# Patient Record
Sex: FEMALE | Race: BLACK OR AFRICAN AMERICAN | NOT HISPANIC OR LATINO | Employment: OTHER | URBAN - METROPOLITAN AREA
[De-identification: names, ages, dates, MRNs, and addresses within clinical notes are randomized per-mention and may not be internally consistent; named-entity substitution may affect disease eponyms.]

---

## 2017-01-10 DIAGNOSIS — I10 ESSENTIAL HYPERTENSION: ICD-10-CM

## 2017-01-10 RX ORDER — LOSARTAN POTASSIUM 25 MG/1
25 TABLET ORAL DAILY
Qty: 90 TABLET | Refills: 1 | Status: SHIPPED | OUTPATIENT
Start: 2017-01-10 | End: 2017-07-14 | Stop reason: SDUPTHER

## 2017-01-10 RX ORDER — OMEPRAZOLE 20 MG/1
CAPSULE, DELAYED RELEASE ORAL
Qty: 180 CAPSULE | Refills: 3 | Status: SHIPPED | OUTPATIENT
Start: 2017-01-10 | End: 2018-05-15 | Stop reason: SDUPTHER

## 2017-01-18 ENCOUNTER — OFFICE VISIT (OUTPATIENT)
Dept: INTERNAL MEDICINE | Facility: CLINIC | Age: 62
End: 2017-01-18

## 2017-01-18 VITALS
WEIGHT: 236.4 LBS | TEMPERATURE: 97.4 F | DIASTOLIC BLOOD PRESSURE: 82 MMHG | HEIGHT: 67 IN | BODY MASS INDEX: 37.1 KG/M2 | SYSTOLIC BLOOD PRESSURE: 124 MMHG

## 2017-01-18 DIAGNOSIS — Z85.850 HISTORY OF THYROID CANCER: ICD-10-CM

## 2017-01-18 DIAGNOSIS — I10 ESSENTIAL HYPERTENSION: ICD-10-CM

## 2017-01-18 DIAGNOSIS — E11.9 TYPE 2 DIABETES MELLITUS WITHOUT COMPLICATION, WITHOUT LONG-TERM CURRENT USE OF INSULIN (HCC): Primary | ICD-10-CM

## 2017-01-18 DIAGNOSIS — E78.5 HYPERLIPIDEMIA, UNSPECIFIED HYPERLIPIDEMIA TYPE: ICD-10-CM

## 2017-01-18 DIAGNOSIS — J30.9 ALLERGIC RHINITIS, UNSPECIFIED ALLERGIC RHINITIS TRIGGER, UNSPECIFIED RHINITIS SEASONALITY: ICD-10-CM

## 2017-01-18 LAB — HBA1C MFR BLD: 5.9 %

## 2017-01-18 PROCEDURE — 83036 HEMOGLOBIN GLYCOSYLATED A1C: CPT | Performed by: NURSE PRACTITIONER

## 2017-01-18 PROCEDURE — 99214 OFFICE O/P EST MOD 30 MIN: CPT | Performed by: NURSE PRACTITIONER

## 2017-01-18 RX ORDER — ATENOLOL 25 MG/1
25 TABLET ORAL DAILY
Qty: 90 TABLET | Refills: 1 | Status: SHIPPED | OUTPATIENT
Start: 2017-01-18 | End: 2018-07-10 | Stop reason: SDUPTHER

## 2017-01-18 RX ORDER — ATENOLOL 25 MG/1
25 TABLET ORAL DAILY
Qty: 90 TABLET | Refills: 1 | Status: CANCELLED | OUTPATIENT
Start: 2017-01-18

## 2017-01-18 RX ORDER — TRIAMTERENE AND HYDROCHLOROTHIAZIDE 37.5; 25 MG/1; MG/1
1 TABLET ORAL DAILY
Qty: 90 TABLET | Refills: 1 | Status: CANCELLED | OUTPATIENT
Start: 2017-01-18

## 2017-01-18 RX ORDER — TRIAMTERENE AND HYDROCHLOROTHIAZIDE 37.5; 25 MG/1; MG/1
1 TABLET ORAL DAILY
Qty: 90 TABLET | Refills: 1 | Status: SHIPPED | OUTPATIENT
Start: 2017-01-18 | End: 2018-02-06 | Stop reason: SDUPTHER

## 2017-01-18 NOTE — PROGRESS NOTES
Subjective   Livier Gutierrez is a 61 y.o. female    Chief Complaint   Patient presents with   • 3 month follow up   • Diabetes     Due for A1C   • Hypertension   • Hyperlipidemia     History of Present Illness     DM II - does not ck FS @home; currently on 500mg of Metformin daily; A1C was 5.9% today, which is unchanged from her last ck.      HTN - chronic and stable on Cozaar, Atenolol and HCTZ; she was in a big rush to get here so she thinks that is why it is up.      HL - has been off of her pravastatin for a while; made her feel terrible      H/O thyroid CA - managed by oncologist at  currently has her on 150mcg; Let her go to the [facility]. she is now seeing her every 6 months. Sees them again in Sept. They did labs and all of the testing and everything came back good.       GERD is chronic and stable on Omeprazole, but only takes it PRN      Allergies are doing ok. Taking Claritin everyday, but likes to have an albuterol inhaler on hand for wheezing/SOA.       Bilateral knee pain - needs bilateral knee replacement, but is trying to hold out; Has been swimming and this really helps.       Flu shot 10/16  Pneumovax 10/13   Zostavax, declines  Prevnar - 4/19/16  Tdap 2012   DEXA - 10/14 (normal)   Ovarian Ca screening; [facility]   Mammn 11/2016  Pap - 7/2014; no longer doing ([facility])  Colon 2016 ([facility]); repeating in 1 year this time, he found some mild crohns  Eye exam - 2/2016  Foot exam 8/6/2015      The following portions of the patient's history were reviewed and updated as appropriate: allergies, current medications, past family history, past medical history, past social history, past surgical history and problem list.    Current Outpatient Prescriptions:   •  albuterol (PROVENTIL HFA) 108 (90 BASE) MCG/ACT inhaler, Inhale 2 puffs every 4 (four) hours as needed for wheezing., Disp: , Rfl:   •  atenolol (TENORMIN) 25 MG tablet, Take 1 tablet by mouth Daily., Disp: 90 tablet, Rfl: 1  •  budesonide-formoterol (SYMBICORT)  80-4.5 MCG/ACT inhaler, Inhale 2 puffs 2 (two) times a day., Disp: , Rfl:   •  diclofenac (VOLTAREN) 75 MG EC tablet, Take 1 tablet by mouth 2 (Two) Times a Day As Needed (for joint pain)., Disp: 180 tablet, Rfl: 1  •  levothyroxine (SYNTHROID, LEVOTHROID) 150 MCG tablet, Take 150 mcg by mouth daily., Disp: , Rfl:   •  losartan (COZAAR) 25 MG tablet, Take 1 tablet by mouth Daily., Disp: 90 tablet, Rfl: 1  •  metFORMIN (GLUCOPHAGE) 500 MG tablet, Take 1 tablet by mouth Daily., Disp: 90 tablet, Rfl: 1  •  omeprazole (priLOSEC) 20 MG capsule, TAKE ONE CAPSULE BY MOUTH TWICE A DAY WITH DICLOFENAC, Disp: 180 capsule, Rfl: 3  •  triamterene-hydrochlorothiazide (MAXZIDE-25) 37.5-25 MG per tablet, Take 1 tablet by mouth Daily., Disp: 90 tablet, Rfl: 1  •  glucose blood test strip, 1 po bid as directed, Disp: 50 each, Rfl: 12  •  glucose monitor monitoring kit, 1 each Daily., Disp: 1 each, Rfl: 0  •  Lancets misc, 1 each Daily., Disp: 60 each, Rfl: 11     Review of Systems   Constitutional: Negative for chills, fatigue and fever.   Respiratory: Negative for cough, chest tightness and shortness of breath.    Cardiovascular: Negative for chest pain.   Gastrointestinal: Negative for abdominal pain, diarrhea, nausea and vomiting.   Endocrine: Negative for cold intolerance and heat intolerance.   Musculoskeletal: Negative for arthralgias.   Neurological: Negative for dizziness.       Objective   Physical Exam   Constitutional: She is oriented to person, place, and time. She appears well-developed and well-nourished.   HENT:   Head: Normocephalic and atraumatic.   Eyes: Conjunctivae and EOM are normal. Pupils are equal, round, and reactive to light.   Neck: Normal range of motion.   Cardiovascular: Normal rate, regular rhythm and normal heart sounds.    Pulmonary/Chest: Effort normal and breath sounds normal.   Abdominal: Soft. Bowel sounds are normal.   Musculoskeletal: Normal range of motion.   Neurological: She is alert and  "oriented to person, place, and time. She has normal reflexes.   Skin: Skin is warm and dry.   Psychiatric: She has a normal mood and affect. Her behavior is normal. Judgment and thought content normal.     Vitals:    01/18/17 1014 01/18/17 1039   BP: 136/98 124/82   Temp: 97.4 °F (36.3 °C)    TempSrc: Temporal Artery     Weight: 236 lb 6.4 oz (107 kg)    Height: 66.5\" (168.9 cm)          Assessment/Plan   Livier was seen today for 3 month follow up, diabetes, hypertension and hyperlipidemia.    Diagnoses and all orders for this visit:    Type 2 diabetes mellitus without complication, without long-term current use of insulin  -     POC Glycosylated Hemoglobin (Hb A1C)  -     metFORMIN (GLUCOPHAGE) 500 MG tablet; Take 1 tablet by mouth Daily.  -     CBC & Differential; Future  -     Comprehensive Metabolic Panel; Future  -     Lipid Panel; Future  -     Vitamin D 25 Hydroxy; Future  -     Vitamin B12; Future  -     TSH; Future    Essential hypertension  -     triamterene-hydrochlorothiazide (MAXZIDE-25) 37.5-25 MG per tablet; Take 1 tablet by mouth Daily.  -     atenolol (TENORMIN) 25 MG tablet; Take 1 tablet by mouth Daily.  -     CBC & Differential; Future  -     Comprehensive Metabolic Panel; Future  -     Lipid Panel; Future  -     Vitamin D 25 Hydroxy; Future  -     Vitamin B12; Future  -     TSH; Future    Hyperlipidemia, unspecified hyperlipidemia type  -     CBC & Differential; Future  -     Comprehensive Metabolic Panel; Future  -     Lipid Panel; Future  -     Vitamin D 25 Hydroxy; Future  -     Vitamin B12; Future  -     TSH; Future    Allergic rhinitis, unspecified allergic rhinitis trigger, unspecified rhinitis seasonality  -     CBC & Differential; Future  -     Comprehensive Metabolic Panel; Future  -     Lipid Panel; Future  -     Vitamin D 25 Hydroxy; Future  -     Vitamin B12; Future  -     TSH; Future    History of thyroid cancer  -     CBC & Differential; Future  -     Comprehensive Metabolic " Panel; Future  -     Lipid Panel; Future  -     Vitamin D 25 Hydroxy; Future  -     Vitamin B12; Future  -     TSH; Future    Other orders  -     Cancel: triamterene-hydrochlorothiazide (MAXZIDE-25) 37.5-25 MG per tablet; Take 1 tablet by mouth Daily.  -     Cancel: metFORMIN (GLUCOPHAGE) 500 MG tablet; Take 1 tablet by mouth Daily.  -     Cancel: atenolol (TENORMIN) 25 MG tablet; Take 1 tablet by mouth Daily.      Livier is not fasting, so she will RTC for labs  Meds refilled  No changes made today  F/U in 6 months or sooner with any problems

## 2017-01-18 NOTE — MR AVS SNAPSHOT
Livier Palacioss   1/18/2017 10:15 AM   Office Visit    Dept Phone:  [phone]   Encounter #:  [id number]    Provider:  GUSTAVO Marcelino   Department:  [facility] INTERNAL MEDICINE                Your Full Care Plan              Where to Get Your Medications      These medications were sent to [address] - [address] [place] [address] ROAD - [phone] PH - [phone]   [address] [place] [address]     Phone:  [phone]     atenolol 25 MG tablet    metFORMIN 500 MG tablet    triamterene-hydrochlorothiazide 37.5-25 MG per tablet            Your Updated Medication List          This list is accurate as of: 1/18/17 10:45 AM.  Always use your most recent med list.                atenolol 25 MG tablet   Commonly known as:  TENORMIN   Take 1 tablet by mouth Daily.       budesonide-formoterol 80-4.5 MCG/ACT inhaler   Commonly known as:  SYMBICORT       diclofenac 75 MG EC tablet   Commonly known as:  VOLTAREN   Take 1 tablet by mouth 2 (Two) Times a Day As Needed (for joint pain).       glucose blood test strip   1 po bid as directed       glucose monitor monitoring kit   1 each Daily.       Lancets misc   1 each Daily.       levothyroxine 150 MCG tablet   Commonly known as:  SYNTHROID, LEVOTHROID       losartan 25 MG tablet   Commonly known as:  COZAAR   Take 1 tablet by mouth Daily.       metFORMIN 500 MG tablet   Commonly known as:  GLUCOPHAGE   Take 1 tablet by mouth Daily.       omeprazole 20 MG capsule   Commonly known as:  priLOSEC   TAKE ONE CAPSULE BY MOUTH TWICE A DAY WITH DICLOFENAC       PROVENTIL  (90 BASE) MCG/ACT inhaler   Generic drug:  albuterol       triamterene-hydrochlorothiazide 37.5-25 MG per tablet   Commonly known as:  MAXZIDE-25   Take 1 tablet by mouth Daily.               We Performed the Following     POC Glycosylated Hemoglobin (Hb A1C)       You Were Diagnosed With        Codes Comments    Type 2 diabetes mellitus without complication, without long-term current use of insulin    -  Primary ICD-10-CM: E11.9  ICD-9-CM: 250.00     Essential hypertension     ICD-10-CM: I10  ICD-9-CM: 401.9     Hyperlipidemia, unspecified hyperlipidemia type     ICD-10-CM: E78.5  ICD-9-CM: 272.4     Allergic rhinitis, unspecified allergic rhinitis trigger, unspecified rhinitis seasonality     ICD-10-CM: J30.9  ICD-9-CM: 477.9     History of thyroid cancer     ICD-10-CM: Z85.850  ICD-9-CM: V10.87       Instructions     None    Patient Instructions History      Upcoming Appointments     Visit Type Date Time Department    FOLLOW UP 2017 10:15 AM [address]    FOLLOW UP 2017  9:30 AM [address]      [employer] Signup     [facility]ry allows you to send messages to your doctor, view your test results, renew your prescriptions, schedule appointments, and more. To sign up, go to [employer] and click on the Sign Up Now link in the New User? box. Enter your Pantry Activation Code exactly as it appears below along with the last four digits of your Social Security Number and your Date of Birth () to complete the sign-up process. If you do not sign up before the expiration date, you must request a new code.    Pantry Activation Code: [email or web address]  Expires: 2017 10:45 AM    If you have questions, you can email [email or web address] or call [phone] to talk to our Pantry staff. Remember, Pantry is NOT to be used for urgent needs. For medical emergencies, dial 911.               Other Info from Your Visit           Your Appointments     2017  9:30 AM EDT   Follow Up with GUSTAVO Marcelino   [facility] (--)     [address] [place] [id number] [phone]           Arrive 15 minutes prior to appointment.              Allergies     Latex  Angioedema    Penicillins        Reason for Visit     3  "month follow up     Diabetes Due for A1C    Hypertension     Hyperlipidemia           Vital Signs     Blood Pressure Temperature Height Weight Last Menstrual Period Body Mass Index    124/82 97.4 °F (36.3 °C) (Temporal Artery ) 66.5\" (168.9 cm) 236 lb 6.4 oz (107 kg) (Approximate) 37.58 kg/m2    Smoking Status                   Former Smoker           Problems and Diagnoses Noted     Nasal inflammation due to allergen    Diabetes    History of thyroid cancer    High cholesterol or triglycerides    High blood pressure      Results     POC Glycosylated Hemoglobin (Hb A1C)      Component Value Standard Range & Units    Hemoglobin A1C 5.9 %                    "

## 2017-01-25 ENCOUNTER — TELEPHONE (OUTPATIENT)
Dept: INTERNAL MEDICINE | Facility: CLINIC | Age: 62
End: 2017-01-25

## 2017-01-25 NOTE — TELEPHONE ENCOUNTER
I spoke with Livier and let her know that 3 places that are self referral for podiatry and she said that you told her a specific female name that she should go to.  She said Nikky Garcia is who she had seen and loved but she retired and she said you mentioned another Female name? I told her I would check with you and see if you remember any specific.

## 2017-01-25 NOTE — TELEPHONE ENCOUNTER
Called Livier back and left her a msg letting her know that  You recommended was Dr. Ana Galeas at [facility] and I left her the phone to her office which is [phone]. I told her she can call me back if she has any problems or questions.

## 2017-01-25 NOTE — TELEPHONE ENCOUNTER
----- Message from Alexsandra Gold sent at 1/25/2017 10:48 AM EST -----  NEEDS NAME OF PODIATRY THAT WAS RECOMMENDED TO HER SHE FORGOT AND DID NOT WRITE DOWN

## 2017-02-14 ENCOUNTER — LAB (OUTPATIENT)
Dept: INTERNAL MEDICINE | Facility: CLINIC | Age: 62
End: 2017-02-14

## 2017-02-14 DIAGNOSIS — Z85.850 HISTORY OF THYROID CANCER: ICD-10-CM

## 2017-02-14 DIAGNOSIS — I10 ESSENTIAL HYPERTENSION: ICD-10-CM

## 2017-02-14 DIAGNOSIS — E78.5 HYPERLIPIDEMIA, UNSPECIFIED HYPERLIPIDEMIA TYPE: ICD-10-CM

## 2017-02-14 DIAGNOSIS — J30.9 ALLERGIC RHINITIS, UNSPECIFIED ALLERGIC RHINITIS TRIGGER, UNSPECIFIED RHINITIS SEASONALITY: ICD-10-CM

## 2017-02-14 DIAGNOSIS — E11.9 TYPE 2 DIABETES MELLITUS WITHOUT COMPLICATION, WITHOUT LONG-TERM CURRENT USE OF INSULIN (HCC): ICD-10-CM

## 2017-02-14 LAB
25(OH)D3 SERPL-MCNC: 36.4 NG/ML
ALBUMIN SERPL-MCNC: 3.8 G/DL (ref 3.2–4.8)
ALBUMIN/GLOB SERPL: 1.3 G/DL (ref 1.5–2.5)
ALP SERPL-CCNC: 40 U/L (ref 25–100)
ALT SERPL W P-5'-P-CCNC: 22 U/L (ref 7–40)
ANION GAP SERPL CALCULATED.3IONS-SCNC: 4 MMOL/L (ref 3–11)
ARTICHOKE IGE QN: 102 MG/DL (ref 0–130)
AST SERPL-CCNC: 21 U/L (ref 0–33)
BASOPHILS # BLD AUTO: 0.03 10*3/MM3 (ref 0–0.2)
BASOPHILS NFR BLD AUTO: 0.4 % (ref 0–1)
BILIRUB SERPL-MCNC: 0.4 MG/DL (ref 0.3–1.2)
BUN BLD-MCNC: 22 MG/DL (ref 9–23)
BUN/CREAT SERPL: 24.4 (ref 7–25)
CALCIUM SPEC-SCNC: 9 MG/DL (ref 8.7–10.4)
CHLORIDE SERPL-SCNC: 103 MMOL/L (ref 99–109)
CHOLEST SERPL-MCNC: 162 MG/DL (ref 0–200)
CO2 SERPL-SCNC: 31 MMOL/L (ref 20–31)
CREAT BLD-MCNC: 0.9 MG/DL (ref 0.6–1.3)
DEPRECATED RDW RBC AUTO: 45 FL (ref 37–54)
EOSINOPHIL # BLD AUTO: 0.22 10*3/MM3 (ref 0.1–0.3)
EOSINOPHIL NFR BLD AUTO: 2.9 % (ref 0–3)
ERYTHROCYTE [DISTWIDTH] IN BLOOD BY AUTOMATED COUNT: 16 % (ref 11.3–14.5)
GFR SERPL CREATININE-BSD FRML MDRD: 77 ML/MIN/1.73
GLOBULIN UR ELPH-MCNC: 3 GM/DL
GLUCOSE BLD-MCNC: 88 MG/DL (ref 70–100)
HCT VFR BLD AUTO: 35.6 % (ref 34.5–44)
HDLC SERPL-MCNC: 36 MG/DL (ref 40–60)
HGB BLD-MCNC: 11.2 G/DL (ref 11.5–15.5)
IMM GRANULOCYTES # BLD: 0.02 10*3/MM3 (ref 0–0.03)
IMM GRANULOCYTES NFR BLD: 0.3 % (ref 0–0.6)
LYMPHOCYTES # BLD AUTO: 1.93 10*3/MM3 (ref 0.6–4.8)
LYMPHOCYTES NFR BLD AUTO: 25.3 % (ref 24–44)
MCH RBC QN AUTO: 24.2 PG (ref 27–31)
MCHC RBC AUTO-ENTMCNC: 31.5 G/DL (ref 32–36)
MCV RBC AUTO: 77.1 FL (ref 80–99)
MONOCYTES # BLD AUTO: 0.71 10*3/MM3 (ref 0–1)
MONOCYTES NFR BLD AUTO: 9.3 % (ref 0–12)
NEUTROPHILS # BLD AUTO: 4.72 10*3/MM3 (ref 1.5–8.3)
NEUTROPHILS NFR BLD AUTO: 61.8 % (ref 41–71)
PLATELET # BLD AUTO: 285 10*3/MM3 (ref 150–450)
PMV BLD AUTO: 8.5 FL (ref 6–12)
POTASSIUM BLD-SCNC: 4 MMOL/L (ref 3.5–5.5)
PROT SERPL-MCNC: 6.8 G/DL (ref 5.7–8.2)
RBC # BLD AUTO: 4.62 10*6/MM3 (ref 3.89–5.14)
SODIUM BLD-SCNC: 138 MMOL/L (ref 132–146)
TRIGL SERPL-MCNC: 140 MG/DL (ref 0–150)
TSH SERPL DL<=0.05 MIU/L-ACNC: 0.12 MIU/ML (ref 0.35–5.35)
VIT B12 BLD-MCNC: 951 PG/ML (ref 211–911)
WBC NRBC COR # BLD: 7.63 10*3/MM3 (ref 3.5–10.8)

## 2017-02-14 PROCEDURE — 82607 VITAMIN B-12: CPT | Performed by: NURSE PRACTITIONER

## 2017-02-14 PROCEDURE — 82306 VITAMIN D 25 HYDROXY: CPT | Performed by: NURSE PRACTITIONER

## 2017-02-14 PROCEDURE — 84443 ASSAY THYROID STIM HORMONE: CPT | Performed by: NURSE PRACTITIONER

## 2017-02-14 PROCEDURE — 85025 COMPLETE CBC W/AUTO DIFF WBC: CPT | Performed by: NURSE PRACTITIONER

## 2017-02-14 PROCEDURE — 80061 LIPID PANEL: CPT | Performed by: NURSE PRACTITIONER

## 2017-02-14 PROCEDURE — 80053 COMPREHEN METABOLIC PANEL: CPT | Performed by: NURSE PRACTITIONER

## 2017-03-09 ENCOUNTER — OFFICE VISIT (OUTPATIENT)
Dept: INTERNAL MEDICINE | Facility: CLINIC | Age: 62
End: 2017-03-09

## 2017-03-09 VITALS
HEIGHT: 67 IN | HEART RATE: 78 BPM | SYSTOLIC BLOOD PRESSURE: 126 MMHG | BODY MASS INDEX: 36.16 KG/M2 | TEMPERATURE: 98.1 F | DIASTOLIC BLOOD PRESSURE: 78 MMHG | OXYGEN SATURATION: 98 % | WEIGHT: 230.4 LBS

## 2017-03-09 DIAGNOSIS — R53.1 WEAKNESS: ICD-10-CM

## 2017-03-09 DIAGNOSIS — R53.83 FATIGUE, UNSPECIFIED TYPE: Primary | ICD-10-CM

## 2017-03-09 PROCEDURE — 99213 OFFICE O/P EST LOW 20 MIN: CPT | Performed by: NURSE PRACTITIONER

## 2017-03-09 NOTE — PROGRESS NOTES
Subjective   Livier Gutierrez is a 61 y.o. female    Chief Complaint   Patient presents with   • Fatigue     lack of appetite and weakness--5 days   • lack of appetite   • Dry Mouth     Fatigue   This is a new problem. The current episode started in the past 7 days (started suddenly on sunday night. ). The problem occurs constantly. The problem has been waxing and waning (seems to be worse in the mornings. ). Associated symptoms include anorexia, chills (one on tues day but none since) and fatigue. Pertinent negatives include no abdominal pain, arthralgias, change in bowel habit, chest pain, coughing, diaphoresis, fever, headaches, joint swelling, myalgias, nausea, neck pain, numbness, rash, sore throat, swollen glands, urinary symptoms, vertigo, visual change, vomiting or weakness. Nothing aggravates the symptoms. She has tried sleep and eating (better after drinking apple juice and ginger ale.  Taking zicam.  Has  not checked her BP or glucose while feeliing ill) for the symptoms. The treatment provided moderate relief.   Has had recent exposure to GI illnesses in her household and has felt gassy, had decreased appetite, and fatigue, but no N/V/D.     The following portions of the patient's history were reviewed and updated as appropriate: allergies, current medications, past family history, past medical history, past social history, past surgical history and problem list.    Current Outpatient Prescriptions:   •  albuterol (PROVENTIL HFA) 108 (90 BASE) MCG/ACT inhaler, Inhale 2 puffs every 4 (four) hours as needed for wheezing., Disp: , Rfl:   •  atenolol (TENORMIN) 25 MG tablet, Take 1 tablet by mouth Daily., Disp: 90 tablet, Rfl: 1  •  budesonide-formoterol (SYMBICORT) 80-4.5 MCG/ACT inhaler, Inhale 2 puffs 2 (two) times a day., Disp: , Rfl:   •  diclofenac (VOLTAREN) 75 MG EC tablet, Take 1 tablet by mouth 2 (Two) Times a Day As Needed (for joint pain)., Disp: 180 tablet, Rfl: 1  •  glucose blood test strip, 1 po  bid as directed, Disp: 50 each, Rfl: 12  •  glucose monitor monitoring kit, 1 each Daily., Disp: 1 each, Rfl: 0  •  Lancets misc, 1 each Daily., Disp: 60 each, Rfl: 11  •  levothyroxine (SYNTHROID, LEVOTHROID) 150 MCG tablet, Take 150 mcg by mouth daily., Disp: , Rfl:   •  losartan (COZAAR) 25 MG tablet, Take 1 tablet by mouth Daily., Disp: 90 tablet, Rfl: 1  •  metFORMIN (GLUCOPHAGE) 500 MG tablet, Take 1 tablet by mouth Daily., Disp: 90 tablet, Rfl: 1  •  omeprazole (priLOSEC) 20 MG capsule, TAKE ONE CAPSULE BY MOUTH TWICE A DAY WITH DICLOFENAC, Disp: 180 capsule, Rfl: 3  •  triamterene-hydrochlorothiazide (MAXZIDE-25) 37.5-25 MG per tablet, Take 1 tablet by mouth Daily., Disp: 90 tablet, Rfl: 1     Review of Systems   Constitutional: Positive for appetite change, chills (one on tues day but none since) and fatigue. Negative for diaphoresis and fever.   HENT: Negative for sore throat.         Dry mouth   Eyes: Positive for discharge. Eye itching: left eye itching and watering intermittently for about a month.   Respiratory: Negative for cough, chest tightness and shortness of breath.    Cardiovascular: Negative for chest pain.   Gastrointestinal: Positive for anorexia. Negative for abdominal pain, change in bowel habit, diarrhea, nausea and vomiting.   Endocrine: Negative for cold intolerance and heat intolerance.   Musculoskeletal: Negative for arthralgias, joint swelling, myalgias and neck pain.   Skin: Negative for rash.   Neurological: Negative for dizziness, vertigo, weakness, numbness and headaches.     Objective   Physical Exam   Constitutional: She is oriented to person, place, and time. She appears well-developed and well-nourished.   HENT:   Head: Normocephalic and atraumatic.   Eyes: Conjunctivae and EOM are normal. Pupils are equal, round, and reactive to light. Right eye exhibits discharge (clear). Right eye exhibits no chemosis. Left eye exhibits no chemosis and no discharge. Right conjunctiva is not  "injected. Left conjunctiva is not injected.   Neck: Normal range of motion.   Cardiovascular: Normal rate, regular rhythm and normal heart sounds.    Pulmonary/Chest: Effort normal and breath sounds normal.   Abdominal: Soft. Bowel sounds are normal.   Musculoskeletal: Normal range of motion.   Neurological: She is alert and oriented to person, place, and time. She has normal reflexes.   Skin: Skin is warm and dry.   Psychiatric: She has a normal mood and affect. Her behavior is normal. Judgment and thought content normal.     Vitals:    03/09/17 1635   BP: 126/78   Pulse: 78   Temp: 98.1 °F (36.7 °C)   SpO2: 98%   Weight: 230 lb 6.4 oz (105 kg)   Height: 66.5\" (168.9 cm)       Assessment/Plan   Livier was seen today for fatigue, lack of appetite and dry mouth.    Diagnoses and all orders for this visit:    Fatigue, unspecified type  -     CBC & Differential  -     Comprehensive Metabolic Panel  -     TSH    Weakness  -     CBC & Differential  -     Comprehensive Metabolic Panel  -     TSH      We will ck labs today  Increase fluids  Symptomatic management from here  RTC if sx's worsen or do not improve         Scribed for GUSTAVO Bettencourt by GUSTAVO Montes Student. 3/14/2017  3:49 PM    I, GUSTAVO Bettencourt, personally performed the services described in this documentation as scribed by the above named individual in my presence, and it is both accurate and complete.  3/14/2017  3:49 PM    GUSTAVO Patterson    "

## 2017-04-11 RX ORDER — DICLOFENAC SODIUM 75 MG/1
TABLET, DELAYED RELEASE ORAL
Qty: 180 TABLET | Refills: 0 | Status: SHIPPED | OUTPATIENT
Start: 2017-04-11 | End: 2017-07-11 | Stop reason: SDUPTHER

## 2017-07-11 ENCOUNTER — OFFICE VISIT (OUTPATIENT)
Dept: INTERNAL MEDICINE | Facility: CLINIC | Age: 62
End: 2017-07-11

## 2017-07-11 VITALS
DIASTOLIC BLOOD PRESSURE: 78 MMHG | TEMPERATURE: 97.3 F | HEIGHT: 67 IN | WEIGHT: 228 LBS | OXYGEN SATURATION: 98 % | HEART RATE: 63 BPM | SYSTOLIC BLOOD PRESSURE: 132 MMHG | BODY MASS INDEX: 35.79 KG/M2

## 2017-07-11 DIAGNOSIS — M25.511 RIGHT SHOULDER PAIN, UNSPECIFIED CHRONICITY: ICD-10-CM

## 2017-07-11 DIAGNOSIS — E78.5 HYPERLIPIDEMIA, UNSPECIFIED HYPERLIPIDEMIA TYPE: ICD-10-CM

## 2017-07-11 DIAGNOSIS — C73 MALIGNANT NEOPLASM OF THYROID GLAND (HCC): ICD-10-CM

## 2017-07-11 DIAGNOSIS — I10 ESSENTIAL HYPERTENSION: ICD-10-CM

## 2017-07-11 DIAGNOSIS — M54.12 CERVICAL RADICULOPATHY: ICD-10-CM

## 2017-07-11 DIAGNOSIS — Z11.59 NEED FOR HEPATITIS C SCREENING TEST: ICD-10-CM

## 2017-07-11 DIAGNOSIS — E11.9 TYPE 2 DIABETES MELLITUS WITHOUT COMPLICATION, WITHOUT LONG-TERM CURRENT USE OF INSULIN (HCC): Primary | ICD-10-CM

## 2017-07-11 LAB
25(OH)D3 SERPL-MCNC: 38 NG/ML
ALBUMIN SERPL-MCNC: 3.8 G/DL (ref 3.2–4.8)
ALBUMIN/GLOB SERPL: 1.3 G/DL (ref 1.5–2.5)
ALP SERPL-CCNC: 48 U/L (ref 25–100)
ALT SERPL W P-5'-P-CCNC: 16 U/L (ref 7–40)
ANION GAP SERPL CALCULATED.3IONS-SCNC: 2 MMOL/L (ref 3–11)
ARTICHOKE IGE QN: 120 MG/DL (ref 0–130)
AST SERPL-CCNC: 18 U/L (ref 0–33)
BASOPHILS # BLD AUTO: 0.03 10*3/MM3 (ref 0–0.2)
BASOPHILS NFR BLD AUTO: 0.4 % (ref 0–1)
BILIRUB SERPL-MCNC: 0.4 MG/DL (ref 0.3–1.2)
BUN BLD-MCNC: 22 MG/DL (ref 9–23)
BUN/CREAT SERPL: 22 (ref 7–25)
CALCIUM SPEC-SCNC: 9.1 MG/DL (ref 8.7–10.4)
CHLORIDE SERPL-SCNC: 102 MMOL/L (ref 99–109)
CHOLEST SERPL-MCNC: 185 MG/DL (ref 0–200)
CO2 SERPL-SCNC: 36 MMOL/L (ref 20–31)
CREAT BLD-MCNC: 1 MG/DL (ref 0.6–1.3)
DEPRECATED RDW RBC AUTO: 44.5 FL (ref 37–54)
EOSINOPHIL # BLD AUTO: 0.33 10*3/MM3 (ref 0–0.3)
EOSINOPHIL NFR BLD AUTO: 4.6 % (ref 0–3)
ERYTHROCYTE [DISTWIDTH] IN BLOOD BY AUTOMATED COUNT: 15.8 % (ref 11.3–14.5)
GFR SERPL CREATININE-BSD FRML MDRD: 68 ML/MIN/1.73
GLOBULIN UR ELPH-MCNC: 3 GM/DL
GLUCOSE BLD-MCNC: 92 MG/DL (ref 70–100)
HBA1C MFR BLD: 5.6 %
HCT VFR BLD AUTO: 35.4 % (ref 34.5–44)
HCV AB SER DONR QL: NORMAL
HDLC SERPL-MCNC: 40 MG/DL (ref 40–60)
HGB BLD-MCNC: 11.2 G/DL (ref 11.5–15.5)
IMM GRANULOCYTES # BLD: 0.01 10*3/MM3 (ref 0–0.03)
IMM GRANULOCYTES NFR BLD: 0.1 % (ref 0–0.6)
LYMPHOCYTES # BLD AUTO: 2.02 10*3/MM3 (ref 0.6–4.8)
LYMPHOCYTES NFR BLD AUTO: 28.1 % (ref 24–44)
MCH RBC QN AUTO: 24.3 PG (ref 27–31)
MCHC RBC AUTO-ENTMCNC: 31.6 G/DL (ref 32–36)
MCV RBC AUTO: 77 FL (ref 80–99)
MONOCYTES # BLD AUTO: 0.68 10*3/MM3 (ref 0–1)
MONOCYTES NFR BLD AUTO: 9.4 % (ref 0–12)
NEUTROPHILS # BLD AUTO: 4.13 10*3/MM3 (ref 1.5–8.3)
NEUTROPHILS NFR BLD AUTO: 57.4 % (ref 41–71)
PLATELET # BLD AUTO: 299 10*3/MM3 (ref 150–450)
PMV BLD AUTO: 8.5 FL (ref 6–12)
POTASSIUM BLD-SCNC: 4.2 MMOL/L (ref 3.5–5.5)
PROT SERPL-MCNC: 6.8 G/DL (ref 5.7–8.2)
RBC # BLD AUTO: 4.6 10*6/MM3 (ref 3.89–5.14)
SODIUM BLD-SCNC: 140 MMOL/L (ref 132–146)
TRIGL SERPL-MCNC: 115 MG/DL (ref 0–150)
TSH SERPL DL<=0.05 MIU/L-ACNC: 0.1 MIU/ML (ref 0.35–5.35)
VIT B12 BLD-MCNC: 896 PG/ML (ref 211–911)
WBC NRBC COR # BLD: 7.2 10*3/MM3 (ref 3.5–10.8)

## 2017-07-11 PROCEDURE — 84443 ASSAY THYROID STIM HORMONE: CPT | Performed by: NURSE PRACTITIONER

## 2017-07-11 PROCEDURE — 86803 HEPATITIS C AB TEST: CPT | Performed by: NURSE PRACTITIONER

## 2017-07-11 PROCEDURE — 99214 OFFICE O/P EST MOD 30 MIN: CPT | Performed by: NURSE PRACTITIONER

## 2017-07-11 PROCEDURE — 83036 HEMOGLOBIN GLYCOSYLATED A1C: CPT | Performed by: NURSE PRACTITIONER

## 2017-07-11 PROCEDURE — 82306 VITAMIN D 25 HYDROXY: CPT | Performed by: NURSE PRACTITIONER

## 2017-07-11 PROCEDURE — 80061 LIPID PANEL: CPT | Performed by: NURSE PRACTITIONER

## 2017-07-11 PROCEDURE — 82607 VITAMIN B-12: CPT | Performed by: NURSE PRACTITIONER

## 2017-07-11 PROCEDURE — 85025 COMPLETE CBC W/AUTO DIFF WBC: CPT | Performed by: NURSE PRACTITIONER

## 2017-07-11 PROCEDURE — 80053 COMPREHEN METABOLIC PANEL: CPT | Performed by: NURSE PRACTITIONER

## 2017-07-11 RX ORDER — DICLOFENAC SODIUM 75 MG/1
75 TABLET, DELAYED RELEASE ORAL 2 TIMES DAILY PRN
Qty: 180 TABLET | Refills: 1 | Status: SHIPPED | OUTPATIENT
Start: 2017-07-11 | End: 2018-01-08 | Stop reason: SDUPTHER

## 2017-07-11 RX ORDER — GABAPENTIN 300 MG/1
300 CAPSULE ORAL 3 TIMES DAILY
COMMUNITY
Start: 2017-06-06 | End: 2018-07-26

## 2017-07-11 NOTE — PROGRESS NOTES
Subjective   Livier Gutierrez is a 62 y.o. female    Chief Complaint   Patient presents with   • 6 month follow up   • Diabetes     due for A1c.   • Hypertension   • Hyperlipidemia     History of Present Illness     DM II - does not ck FS @home; currently on 500mg of Metformin daily; A1C was 5.6% today, which is down from her last A1C of 5.9% in January.  She has lost about 8 lbs through diet since her last follow up    Since her last f/u, she has seen podiatry.  An EMG of her LE's was ordered for BLE pain, N/T.  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  She was started on Gabapentin.  Cannot tolerate a higher dose than 2 PO QHS.  Does feel that it has helped with her sx's.       Having decreased ROM and pain in her right shoulder with drawing in her right hand over the past month.  No N/T.  No new injury or trauma.  She does have a h/o right shoulder pain/problems.  Went to PT years ago and has been doing the exercises that she was given, but it is not helping.        HTN - chronic and stable on Cozaar, Atenolol and HCTZ; she was in a big rush to get here so she thinks that is why it is up.      HL - has been off of her pravastatin for a while; made her feel terrible      H/O thyroid CA - managed by oncologist at  currently has her on 150mcg; Let her go to the [facility]. She is now seeing her every 6 months.        GERD is chronic and stable on Omeprazole, but only takes it PRN      Allergies are doing ok. Taking Claritin everyday, but likes to have an albuterol inhaler on hand for wheezing/SOA.       Bilateral knee pain - needs bilateral knee replacement, but is trying to hold out; Has been swimming and this really helps. Takes Diclofenac PRN for pain      Flu shot 10/16  Pneumovax 10/13   Zostavax, declines  Prevnar - 4/19/16  Tdap 2012   DEXA - 10/14 (normal)   Ovarian Ca screening; [facility]   Mammn 11/2016  Pap - 7/2014; no longer doing ([facility])  Colon 2016 ([facility]); repeating in 1 year this time, he  found some mild crohns - she will call  Eye exam - 2/2017  Foot exam 8/6/2015       The following portions of the patient's history were reviewed and updated as appropriate: allergies, current medications, past family history, past medical history, past social history, past surgical history and problem list.    Current Outpatient Prescriptions:   •  albuterol (PROVENTIL HFA) 108 (90 BASE) MCG/ACT inhaler, Inhale 2 puffs every 4 (four) hours as needed for wheezing., Disp: , Rfl:   •  atenolol (TENORMIN) 25 MG tablet, Take 1 tablet by mouth Daily., Disp: 90 tablet, Rfl: 1  •  budesonide-formoterol (SYMBICORT) 80-4.5 MCG/ACT inhaler, Inhale 2 puffs 2 (two) times a day., Disp: , Rfl:   •  diclofenac (VOLTAREN) 75 MG EC tablet, Take 1 tablet by mouth 2 (Two) Times a Day As Needed (pain)., Disp: 180 tablet, Rfl: 1  •  gabapentin (NEURONTIN) 300 MG capsule, 2 po qhs, Disp: , Rfl:   •  glucose blood test strip, 1 po bid as directed, Disp: 50 each, Rfl: 12  •  glucose monitor monitoring kit, 1 each Daily., Disp: 1 each, Rfl: 0  •  Lancets misc, 1 each Daily., Disp: 60 each, Rfl: 11  •  levothyroxine (SYNTHROID, LEVOTHROID) 150 MCG tablet, Take 150 mcg by mouth daily., Disp: , Rfl:   •  losartan (COZAAR) 25 MG tablet, Take 1 tablet by mouth Daily., Disp: 90 tablet, Rfl: 1  •  metFORMIN (GLUCOPHAGE) 500 MG tablet, Take 1 tablet by mouth Daily., Disp: 90 tablet, Rfl: 1  •  omeprazole (priLOSEC) 20 MG capsule, TAKE ONE CAPSULE BY MOUTH TWICE A DAY WITH DICLOFENAC, Disp: 180 capsule, Rfl: 3  •  triamterene-hydrochlorothiazide (MAXZIDE-25) 37.5-25 MG per tablet, Take 1 tablet by mouth Daily., Disp: 90 tablet, Rfl: 1     Review of Systems   Constitutional: Negative for chills, fatigue and fever.   Respiratory: Negative for cough, chest tightness and shortness of breath.    Cardiovascular: Negative for chest pain.   Gastrointestinal: Negative for abdominal pain, diarrhea, nausea and vomiting.   Endocrine: Negative for cold  "intolerance and heat intolerance.   Musculoskeletal: Positive for arthralgias and myalgias.        RUE pain/hand drawing   Neurological: Negative for dizziness.       Objective   Physical Exam   Constitutional: She is oriented to person, place, and time. She appears well-developed and well-nourished.   HENT:   Head: Normocephalic and atraumatic.   Eyes: Conjunctivae and EOM are normal. Pupils are equal, round, and reactive to light.   Neck: Normal range of motion.   Cardiovascular: Normal rate, regular rhythm and normal heart sounds.    Pulmonary/Chest: Effort normal and breath sounds normal.   Abdominal: Soft. Bowel sounds are normal.   Musculoskeletal:        Right shoulder: She exhibits decreased range of motion, crepitus and decreased strength. She exhibits no tenderness, no bony tenderness, no swelling, no effusion, no deformity, no laceration, no pain, no spasm and normal pulse.        Cervical back: Normal.   Neurological: She is alert and oriented to person, place, and time. She has normal reflexes.   Skin: Skin is warm and dry.   Psychiatric: She has a normal mood and affect. Her behavior is normal. Judgment and thought content normal.     Vitals:    07/11/17 0947   BP: 132/78   BP Location: Left arm   Pulse: 63   Temp: 97.3 °F (36.3 °C)   TempSrc: Temporal Artery    SpO2: 98%   Weight: 228 lb (103 kg)   Height: 66.5\" (168.9 cm)         Assessment/Plan   Livier was seen today for 6 month follow up, diabetes, hypertension and hyperlipidemia.    Diagnoses and all orders for this visit:    Type 2 diabetes mellitus without complication, without long-term current use of insulin  -     POC Glycosylated Hemoglobin (Hb A1C)  -     CBC & Differential  -     Comprehensive Metabolic Panel  -     Lipid Panel  -     Vitamin D 25 Hydroxy  -     Vitamin B12  -     TSH  -     metFORMIN (GLUCOPHAGE) 500 MG tablet; Take 1 tablet by mouth Daily.  -     CBC Auto Differential    Cervical radiculopathy  -     XR Spine Cervical " Complete 4 or 5 View; Future  -     CBC & Differential  -     Comprehensive Metabolic Panel  -     Lipid Panel  -     Vitamin D 25 Hydroxy  -     Vitamin B12  -     TSH  -     diclofenac (VOLTAREN) 75 MG EC tablet; Take 1 tablet by mouth 2 (Two) Times a Day As Needed (pain).  -     CBC Auto Differential    Right shoulder pain, unspecified chronicity  -     XR Shoulder 2+ View Right; Future  -     CBC & Differential  -     Comprehensive Metabolic Panel  -     Lipid Panel  -     Vitamin D 25 Hydroxy  -     Vitamin B12  -     TSH  -     diclofenac (VOLTAREN) 75 MG EC tablet; Take 1 tablet by mouth 2 (Two) Times a Day As Needed (pain).  -     CBC Auto Differential    Hyperlipidemia, unspecified hyperlipidemia type  -     CBC & Differential  -     Comprehensive Metabolic Panel  -     Lipid Panel  -     Vitamin D 25 Hydroxy  -     Vitamin B12  -     TSH  -     CBC Auto Differential    Essential hypertension  -     CBC & Differential  -     Comprehensive Metabolic Panel  -     Lipid Panel  -     Vitamin D 25 Hydroxy  -     Vitamin B12  -     TSH  -     CBC Auto Differential    Malignant neoplasm of thyroid gland  -     CBC & Differential  -     Comprehensive Metabolic Panel  -     Lipid Panel  -     Vitamin D 25 Hydroxy  -     Vitamin B12  -     TSH  -     CBC Auto Differential    Need for hepatitis C screening test  -     Hepatitis C Antibody    We will have Livier go for an xray of her neck and right shoulder based on RUE sx's  If normal, may need EMG and/or PT  Labs sent   No med changes  She will call [facility] to inquire about a colonoscopy

## 2017-07-14 DIAGNOSIS — I10 ESSENTIAL HYPERTENSION: ICD-10-CM

## 2017-07-14 RX ORDER — LOSARTAN POTASSIUM 25 MG/1
TABLET ORAL
Qty: 90 TABLET | Refills: 1 | Status: SHIPPED | OUTPATIENT
Start: 2017-07-14 | End: 2018-01-08 | Stop reason: SDUPTHER

## 2017-12-21 DIAGNOSIS — E11.9 TYPE 2 DIABETES MELLITUS WITHOUT COMPLICATION, WITHOUT LONG-TERM CURRENT USE OF INSULIN (HCC): ICD-10-CM

## 2018-01-08 DIAGNOSIS — I10 ESSENTIAL HYPERTENSION: ICD-10-CM

## 2018-01-08 DIAGNOSIS — M25.511 RIGHT SHOULDER PAIN, UNSPECIFIED CHRONICITY: ICD-10-CM

## 2018-01-08 DIAGNOSIS — M54.12 CERVICAL RADICULOPATHY: ICD-10-CM

## 2018-01-08 RX ORDER — LOSARTAN POTASSIUM 25 MG/1
TABLET ORAL
Qty: 90 TABLET | Refills: 0 | Status: SHIPPED | OUTPATIENT
Start: 2018-01-08 | End: 2018-04-16 | Stop reason: SDUPTHER

## 2018-01-08 RX ORDER — DICLOFENAC SODIUM 75 MG/1
TABLET, DELAYED RELEASE ORAL
Qty: 180 TABLET | Refills: 0 | Status: SHIPPED | OUTPATIENT
Start: 2018-01-08 | End: 2018-04-09 | Stop reason: SDUPTHER

## 2018-01-18 ENCOUNTER — OFFICE VISIT (OUTPATIENT)
Dept: INTERNAL MEDICINE | Facility: CLINIC | Age: 63
End: 2018-01-18

## 2018-01-18 VITALS
OXYGEN SATURATION: 98 % | RESPIRATION RATE: 18 BRPM | HEART RATE: 66 BPM | HEIGHT: 66 IN | WEIGHT: 238.6 LBS | SYSTOLIC BLOOD PRESSURE: 102 MMHG | BODY MASS INDEX: 38.35 KG/M2 | TEMPERATURE: 97.7 F | DIASTOLIC BLOOD PRESSURE: 62 MMHG

## 2018-01-18 DIAGNOSIS — I10 ESSENTIAL HYPERTENSION: ICD-10-CM

## 2018-01-18 DIAGNOSIS — M17.0 OSTEOARTHRITIS OF BOTH KNEES, UNSPECIFIED OSTEOARTHRITIS TYPE: ICD-10-CM

## 2018-01-18 DIAGNOSIS — Z85.850 HISTORY OF THYROID CANCER: ICD-10-CM

## 2018-01-18 DIAGNOSIS — E11.9 TYPE 2 DIABETES MELLITUS WITHOUT COMPLICATION, WITHOUT LONG-TERM CURRENT USE OF INSULIN (HCC): ICD-10-CM

## 2018-01-18 DIAGNOSIS — Z00.00 ANNUAL PHYSICAL EXAM: Primary | ICD-10-CM

## 2018-01-18 DIAGNOSIS — E78.5 HYPERLIPIDEMIA, UNSPECIFIED HYPERLIPIDEMIA TYPE: ICD-10-CM

## 2018-01-18 LAB
BILIRUB BLD-MCNC: NEGATIVE MG/DL
CLARITY, POC: CLEAR
COLOR UR: YELLOW
GLUCOSE UR STRIP-MCNC: NEGATIVE MG/DL
HBA1C MFR BLD: 5.7 %
KETONES UR QL: NEGATIVE
LEUKOCYTE EST, POC: NEGATIVE
NITRITE UR-MCNC: NEGATIVE MG/ML
PH UR: 6 [PH] (ref 5–8)
PROT UR STRIP-MCNC: NEGATIVE MG/DL
RBC # UR STRIP: NEGATIVE /UL
SP GR UR: 1.01 (ref 1–1.03)
UROBILINOGEN UR QL: NORMAL

## 2018-01-18 PROCEDURE — 83036 HEMOGLOBIN GLYCOSYLATED A1C: CPT | Performed by: NURSE PRACTITIONER

## 2018-01-18 PROCEDURE — 82043 UR ALBUMIN QUANTITATIVE: CPT | Performed by: NURSE PRACTITIONER

## 2018-01-18 PROCEDURE — 99396 PREV VISIT EST AGE 40-64: CPT | Performed by: NURSE PRACTITIONER

## 2018-01-18 PROCEDURE — 81003 URINALYSIS AUTO W/O SCOPE: CPT | Performed by: NURSE PRACTITIONER

## 2018-01-18 PROCEDURE — 82270 OCCULT BLOOD FECES: CPT | Performed by: NURSE PRACTITIONER

## 2018-01-18 RX ORDER — TRIAMCINOLONE ACETONIDE 1 MG/G
CREAM TOPICAL 2 TIMES DAILY
Qty: 45 G | Refills: 2 | Status: SHIPPED | OUTPATIENT
Start: 2018-01-18 | End: 2019-11-15

## 2018-01-18 NOTE — PROGRESS NOTES
Akiko Gutierrez is a 62 y.o. female    Chief Complaint   Patient presents with   • Annual Exam     History of Present Illness     Here for PE    DM II - does not ck FS @home; currently on 500mg of Metformin daily; A1C was 5.7% today, which is stable from 5.6% in July 2017.       Has seen podiatry in the past for BLE N/T.  An EMG of her LE's was ordered for BLE pain, N/T.  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  She was started on Gabapentin.  Cannot tolerate a higher dose than 2 PO QHS.  Does feel that it has helped with her sx's.           HTN - chronic and stable on Cozaar, Atenolol and HCTZ.      HL - has been off of her pravastatin for a while; made her feel terrible      H/O thyroid CA - managed by oncologist at  currently has her on 150mcg; Let her go to the [facility]. She is now seeing her every 6 months.        GERD is chronic and stable on Omeprazole, but only takes it PRN      Allergies are doing ok. Taking Claritin everyday, but likes to have an albuterol inhaler on hand for wheezing/SOA.       Bilateral knee pain - needs bilateral knee replacement, but is trying to hold out. Takes Diclofenac PRN for pain.  Has not seen Ortho in several years.  I am concerned as she can barely walk through the office today.  Using a cane.        Flu shot 10/17  Pneumovax 10/13   Zostavax, declines  Prevnar - 4/19/16  Tdap 2012   DEXA - 10/14 (normal)   Ovarian Ca screening; [facility]   Mammn 12/19/17  Pap - 7/2014; no longer doing ([facility])  Colon 2016 ([facility]); repeating in 1 year this time, he found some mild crohns - she called is office and since she is sx free, he is going to hold off on a colon for now.    Eye exam - 2/2017  Foot exam 6/2017; has appt in 2/18    Diet - tries to eat low carb/healthy    Exercise - very minimal due to knee pain    Social History   Substance Use Topics   • Smoking status: Former Smoker   • Smokeless tobacco: Never Used      Comment: quit about 32 years ago   •  Alcohol use No         The following portions of the patient's history were reviewed and updated as appropriate: allergies, current medications, past family history, past medical history, past social history, past surgical history and problem list.    Current Outpatient Prescriptions:   •  albuterol (PROVENTIL HFA) 108 (90 BASE) MCG/ACT inhaler, Inhale 2 puffs every 4 (four) hours as needed for wheezing., Disp: , Rfl:   •  atenolol (TENORMIN) 25 MG tablet, Take 1 tablet by mouth Daily., Disp: 90 tablet, Rfl: 1  •  budesonide-formoterol (SYMBICORT) 80-4.5 MCG/ACT inhaler, Inhale 2 puffs 2 (two) times a day., Disp: , Rfl:   •  diclofenac (VOLTAREN) 75 MG EC tablet, TAKE ONE TABLET BY MOUTH TWICE A DAY AS NEEDED FOR PAIN, Disp: 180 tablet, Rfl: 0  •  gabapentin (NEURONTIN) 300 MG capsule, Take 300 mg by mouth 3 (Three) Times a Day. 2 po qhs, Disp: , Rfl:   •  glucose blood test strip, 1 po bid as directed- One touch ultra Blue 100, Disp: 100 each, Rfl: 12  •  glucose monitor monitoring kit, 1 each Daily., Disp: 1 each, Rfl: 0  •  Lancets misc, 1 each Daily., Disp: 60 each, Rfl: 11  •  levothyroxine (SYNTHROID, LEVOTHROID) 150 MCG tablet, Take 150 mcg by mouth daily., Disp: , Rfl:   •  losartan (COZAAR) 25 MG tablet, TAKE ONE TABLET BY MOUTH DAILY, Disp: 90 tablet, Rfl: 0  •  metFORMIN (GLUCOPHAGE) 500 MG tablet, Take 1 tablet by mouth Daily., Disp: 90 tablet, Rfl: 1  •  omeprazole (priLOSEC) 20 MG capsule, TAKE ONE CAPSULE BY MOUTH TWICE A DAY WITH DICLOFENAC, Disp: 180 capsule, Rfl: 3  •  triamcinolone (KENALOG) 0.1 % cream, Apply  topically 2 (Two) Times a Day., Disp: 45 g, Rfl: 2  •  triamterene-hydrochlorothiazide (MAXZIDE-25) 37.5-25 MG per tablet, Take 1 tablet by mouth Daily., Disp: 90 tablet, Rfl: 1     Review of Systems   Constitutional: Negative for appetite change, chills, fatigue, fever and unexpected weight change.   HENT: Negative for congestion, ear pain, nosebleeds, rhinorrhea and tinnitus.    Eyes:  Negative for pain.   Respiratory: Negative for cough, chest tightness and shortness of breath.    Cardiovascular: Negative for chest pain, palpitations and leg swelling.   Gastrointestinal: Negative for abdominal distention, abdominal pain, blood in stool, constipation, diarrhea, nausea and vomiting.   Endocrine: Negative for cold intolerance, heat intolerance, polydipsia, polyphagia and polyuria.   Genitourinary: Negative for dysuria, flank pain, frequency, hematuria and urgency.   Musculoskeletal: Positive for arthralgias and gait problem. Negative for back pain, joint swelling, myalgias and neck pain.   Skin: Negative for color change, pallor, rash and wound.   Allergic/Immunologic: Negative for environmental allergies and food allergies.   Neurological: Negative for dizziness, syncope, weakness, light-headedness, numbness and headaches.   Hematological: Negative for adenopathy. Does not bruise/bleed easily.   Psychiatric/Behavioral: Negative for behavioral problems and suicidal ideas. The patient is not nervous/anxious.        Objective   Physical Exam   Constitutional: She is oriented to person, place, and time. She appears well-developed and well-nourished.   HENT:   Head: Normocephalic and atraumatic.   Right Ear: External ear normal.   Left Ear: External ear normal.   Nose: Nose normal.   Mouth/Throat: Oropharynx is clear and moist.   Eyes: Conjunctivae and EOM are normal. Pupils are equal, round, and reactive to light.   Neck: Normal range of motion. Neck supple.   Cardiovascular: Normal rate, regular rhythm, normal heart sounds and intact distal pulses.    Pulses:       Carotid pulses are 2+ on the right side, and 2+ on the left side.  Pulmonary/Chest: Effort normal and breath sounds normal. Right breast exhibits no inverted nipple, no mass, no nipple discharge, no skin change and no tenderness. Left breast exhibits no inverted nipple, no mass, no nipple discharge, no skin change and no tenderness. Breasts  "are symmetrical. There is no breast swelling.   Abdominal: Soft. Normal appearance, normal aorta and bowel sounds are normal.   Genitourinary: Rectal exam shows guaiac negative stool. No breast tenderness, discharge or bleeding.   Musculoskeletal:        Right knee: She exhibits decreased range of motion, swelling and effusion. She exhibits no ecchymosis, no deformity, no laceration, no erythema, normal alignment, no LCL laxity, normal patellar mobility, no bony tenderness, normal meniscus and no MCL laxity. Tenderness found. No medial joint line, no lateral joint line, no MCL, no LCL and no patellar tendon tenderness noted.        Left knee: She exhibits decreased range of motion, swelling and effusion. She exhibits no ecchymosis, no deformity, no laceration, no erythema, normal alignment, no LCL laxity, normal patellar mobility, no bony tenderness, normal meniscus and no MCL laxity. Tenderness found. No medial joint line, no lateral joint line, no MCL, no LCL and no patellar tendon tenderness noted.   Lymphadenopathy:        Head (right side): No tonsillar adenopathy present.        Head (left side): No tonsillar adenopathy present.        Right cervical: No superficial cervical and no posterior cervical adenopathy present.       Left cervical: No superficial cervical and no posterior cervical adenopathy present.   Neurological: She is alert and oriented to person, place, and time. She has normal strength and normal reflexes. She displays a negative Romberg sign.   Skin: Skin is warm, dry and intact.   Psychiatric: She has a normal mood and affect. Her behavior is normal. Judgment and thought content normal.   Vitals reviewed.    Vitals:    01/18/18 1406   BP: 102/62   Pulse: 66   Resp: 18   Temp: 97.7 °F (36.5 °C)   TempSrc: Temporal Artery    SpO2: 98%   Weight: 108 kg (238 lb 9.6 oz)   Height: 168.9 cm (66.5\")         Assessment/Plan   Livier was seen today for annual exam.    Diagnoses and all orders for this " visit:    Annual physical exam  -     POC Urinalysis Dipstick, Automated  -     CBC & Differential; Future  -     Comprehensive Metabolic Panel; Future  -     Lipid Panel; Future  -     Vitamin D 25 Hydroxy; Future  -     Vitamin B12; Future  -     TSH; Future  -     POC Occult Blood Stool  -     MicroAlbumin, Urine, Random - Urine, Clean Catch    Type 2 diabetes mellitus without complication, without long-term current use of insulin  -     POC Glycosylated Hemoglobin (Hb A1C)  -     CBC & Differential; Future  -     Comprehensive Metabolic Panel; Future  -     Lipid Panel; Future  -     Vitamin D 25 Hydroxy; Future  -     Vitamin B12; Future  -     TSH; Future  -     MicroAlbumin, Urine, Random - Urine, Clean Catch    Essential hypertension  -     CBC & Differential; Future  -     Comprehensive Metabolic Panel; Future  -     Lipid Panel; Future  -     Vitamin D 25 Hydroxy; Future  -     Vitamin B12; Future  -     TSH; Future    Hyperlipidemia, unspecified hyperlipidemia type  -     CBC & Differential; Future  -     Comprehensive Metabolic Panel; Future  -     Lipid Panel; Future  -     Vitamin D 25 Hydroxy; Future  -     Vitamin B12; Future  -     TSH; Future    History of thyroid cancer  -     CBC & Differential; Future  -     Comprehensive Metabolic Panel; Future  -     Lipid Panel; Future  -     Vitamin D 25 Hydroxy; Future  -     Vitamin B12; Future  -     TSH; Future    Osteoarthritis of both knees, unspecified osteoarthritis type  -     Ambulatory Referral to Orthopedic Surgery  -     CBC & Differential; Future  -     Comprehensive Metabolic Panel; Future  -     Lipid Panel; Future  -     Vitamin D 25 Hydroxy; Future  -     Vitamin B12; Future  -     TSH; Future    Other orders  -     triamcinolone (KENALOG) 0.1 % cream; Apply  topically 2 (Two) Times a Day.    Livier is not fasting, so she will RTC for labs  Referred to Ortho for worsening knee pain and difficulty walking  No med changes  Counseling - diet  and exercise  F/U in 3 months or sooner with any problems

## 2018-01-20 LAB — MICROALBUMIN UR-MCNC: 3.3 UG/ML

## 2018-01-22 LAB
DEVELOPER EXPIRATION DATE: NORMAL
DEVELOPER LOT NUMBER: NORMAL
EXPIRATION DATE: NORMAL
FECAL OCCULT BLOOD SCREEN, POC: NEGATIVE
Lab: NORMAL
NEGATIVE CONTROL: NEGATIVE
POSITIVE CONTROL: POSITIVE

## 2018-01-30 ENCOUNTER — LAB (OUTPATIENT)
Dept: INTERNAL MEDICINE | Facility: CLINIC | Age: 63
End: 2018-01-30

## 2018-01-30 ENCOUNTER — OFFICE VISIT (OUTPATIENT)
Dept: ORTHOPEDIC SURGERY | Facility: CLINIC | Age: 63
End: 2018-01-30

## 2018-01-30 VITALS
HEIGHT: 66 IN | DIASTOLIC BLOOD PRESSURE: 94 MMHG | SYSTOLIC BLOOD PRESSURE: 139 MMHG | WEIGHT: 232.14 LBS | HEART RATE: 66 BPM | BODY MASS INDEX: 37.31 KG/M2

## 2018-01-30 DIAGNOSIS — Z00.00 ANNUAL PHYSICAL EXAM: ICD-10-CM

## 2018-01-30 DIAGNOSIS — Z85.850 HISTORY OF THYROID CANCER: ICD-10-CM

## 2018-01-30 DIAGNOSIS — E66.9 OBESITY (BMI 30-39.9): ICD-10-CM

## 2018-01-30 DIAGNOSIS — M17.0 PRIMARY OSTEOARTHRITIS OF BOTH KNEES: Primary | ICD-10-CM

## 2018-01-30 DIAGNOSIS — E11.9 TYPE 2 DIABETES MELLITUS WITHOUT COMPLICATION, WITHOUT LONG-TERM CURRENT USE OF INSULIN (HCC): ICD-10-CM

## 2018-01-30 DIAGNOSIS — E78.5 HYPERLIPIDEMIA, UNSPECIFIED HYPERLIPIDEMIA TYPE: ICD-10-CM

## 2018-01-30 DIAGNOSIS — I10 ESSENTIAL HYPERTENSION: ICD-10-CM

## 2018-01-30 DIAGNOSIS — R52 PAIN: ICD-10-CM

## 2018-01-30 DIAGNOSIS — M17.0 OSTEOARTHRITIS OF BOTH KNEES, UNSPECIFIED OSTEOARTHRITIS TYPE: ICD-10-CM

## 2018-01-30 LAB
25(OH)D3 SERPL-MCNC: 36.8 NG/ML
ALBUMIN SERPL-MCNC: 3.8 G/DL (ref 3.2–4.8)
ALBUMIN/GLOB SERPL: 1.2 G/DL (ref 1.5–2.5)
ALP SERPL-CCNC: 41 U/L (ref 25–100)
ALT SERPL W P-5'-P-CCNC: 17 U/L (ref 7–40)
ANION GAP SERPL CALCULATED.3IONS-SCNC: 7 MMOL/L (ref 3–11)
ARTICHOKE IGE QN: 105 MG/DL (ref 0–130)
AST SERPL-CCNC: 16 U/L (ref 0–33)
BASOPHILS # BLD AUTO: 0.05 10*3/MM3 (ref 0–0.2)
BASOPHILS NFR BLD AUTO: 0.7 % (ref 0–1)
BILIRUB SERPL-MCNC: 0.4 MG/DL (ref 0.3–1.2)
BUN BLD-MCNC: 21 MG/DL (ref 9–23)
BUN/CREAT SERPL: 17.5 (ref 7–25)
CALCIUM SPEC-SCNC: 8.5 MG/DL (ref 8.7–10.4)
CHLORIDE SERPL-SCNC: 100 MMOL/L (ref 99–109)
CHOLEST SERPL-MCNC: 169 MG/DL (ref 0–200)
CO2 SERPL-SCNC: 30 MMOL/L (ref 20–31)
CREAT BLD-MCNC: 1.2 MG/DL (ref 0.6–1.3)
DEPRECATED RDW RBC AUTO: 43.6 FL (ref 37–54)
EOSINOPHIL # BLD AUTO: 0.29 10*3/MM3 (ref 0–0.3)
EOSINOPHIL NFR BLD AUTO: 3.8 % (ref 0–3)
ERYTHROCYTE [DISTWIDTH] IN BLOOD BY AUTOMATED COUNT: 16 % (ref 11.3–14.5)
GFR SERPL CREATININE-BSD FRML MDRD: 55 ML/MIN/1.73
GLOBULIN UR ELPH-MCNC: 3.1 GM/DL
GLUCOSE BLD-MCNC: 87 MG/DL (ref 70–100)
HCT VFR BLD AUTO: 35 % (ref 34.5–44)
HDLC SERPL-MCNC: 33 MG/DL (ref 40–60)
HGB BLD-MCNC: 10.9 G/DL (ref 11.5–15.5)
IMM GRANULOCYTES # BLD: 0.02 10*3/MM3 (ref 0–0.03)
IMM GRANULOCYTES NFR BLD: 0.3 % (ref 0–0.6)
LYMPHOCYTES # BLD AUTO: 2.38 10*3/MM3 (ref 0.6–4.8)
LYMPHOCYTES NFR BLD AUTO: 31.4 % (ref 24–44)
MCH RBC QN AUTO: 23 PG (ref 27–31)
MCHC RBC AUTO-ENTMCNC: 31.1 G/DL (ref 32–36)
MCV RBC AUTO: 74 FL (ref 80–99)
MONOCYTES # BLD AUTO: 0.73 10*3/MM3 (ref 0–1)
MONOCYTES NFR BLD AUTO: 9.6 % (ref 0–12)
NEUTROPHILS # BLD AUTO: 4.11 10*3/MM3 (ref 1.5–8.3)
NEUTROPHILS NFR BLD AUTO: 54.2 % (ref 41–71)
PLATELET # BLD AUTO: 346 10*3/MM3 (ref 150–450)
PMV BLD AUTO: 8.4 FL (ref 6–12)
POTASSIUM BLD-SCNC: 4 MMOL/L (ref 3.5–5.5)
PROT SERPL-MCNC: 6.9 G/DL (ref 5.7–8.2)
RBC # BLD AUTO: 4.73 10*6/MM3 (ref 3.89–5.14)
SODIUM BLD-SCNC: 137 MMOL/L (ref 132–146)
TRIGL SERPL-MCNC: 166 MG/DL (ref 0–150)
TSH SERPL DL<=0.05 MIU/L-ACNC: 0.39 MIU/ML (ref 0.35–5.35)
VIT B12 BLD-MCNC: 754 PG/ML (ref 211–911)
WBC NRBC COR # BLD: 7.58 10*3/MM3 (ref 3.5–10.8)

## 2018-01-30 PROCEDURE — 99244 OFF/OP CNSLTJ NEW/EST MOD 40: CPT | Performed by: ORTHOPAEDIC SURGERY

## 2018-01-30 PROCEDURE — 85025 COMPLETE CBC W/AUTO DIFF WBC: CPT | Performed by: NURSE PRACTITIONER

## 2018-01-30 PROCEDURE — 80061 LIPID PANEL: CPT | Performed by: NURSE PRACTITIONER

## 2018-01-30 PROCEDURE — 82607 VITAMIN B-12: CPT | Performed by: NURSE PRACTITIONER

## 2018-01-30 PROCEDURE — 80053 COMPREHEN METABOLIC PANEL: CPT | Performed by: NURSE PRACTITIONER

## 2018-01-30 PROCEDURE — 82306 VITAMIN D 25 HYDROXY: CPT | Performed by: NURSE PRACTITIONER

## 2018-01-30 PROCEDURE — 84443 ASSAY THYROID STIM HORMONE: CPT | Performed by: NURSE PRACTITIONER

## 2018-01-30 RX ORDER — PREGABALIN 25 MG/1
75 CAPSULE ORAL ONCE
Status: CANCELLED | OUTPATIENT
Start: 2018-01-30 | End: 2018-01-30

## 2018-01-30 RX ORDER — ACETAMINOPHEN 325 MG/1
1000 TABLET ORAL ONCE
Status: CANCELLED | OUTPATIENT
Start: 2018-01-30 | End: 2018-01-30

## 2018-01-30 RX ORDER — OXYCODONE HCL 10 MG/1
10 TABLET, FILM COATED, EXTENDED RELEASE ORAL ONCE
Status: CANCELLED | OUTPATIENT
Start: 2018-01-30 | End: 2018-01-30

## 2018-01-30 NOTE — PROGRESS NOTES
Orthopaedic Clinic Note: Knee New Patient    Chief Complaint   Patient presents with   • Left Knee - Pain     (L) Knee Arthroscope in 1990s   • Right Knee - Pain        HPI    Consult from GUSTAVO Murillo Matt is a 62 y.o. female who presents with bilateral knee pain for 15 year(s). Onset has been atraumatic and gradual in nature.  Pain is localized globally throughout both knees.  Both knees are equally painful.  She rates an 8/10 on the pain scale.  Her pain is worse with standing, sitting, knee motion, weightbearing.  Sitting, resting, soaking in a warm tub improve the pain.  Previous treatments have included use of O cane, diclofenac, and gabapentin.  She does have a history of cervical disc herniation which was treated with surgery by Dr. Doyle Bates.  Patient states that her pain is now extremely severe and is limiting her daily activities.  She has to take care of her mother who is elderly and with early dementia symptoms but is having difficulty doing this due to her ongoing knee pain.  She is here today to discuss treatment options for this pain.    Past Medical History:   Diagnosis Date   • Allergic    • Diabetes mellitus    • H/O mammogram 12/19/2017   • History of thyroid cancer    • Hyperlipidemia    • Hypertension    • Joint pain    • Pap smear for cervical cancer screening 07/14/2014      Past Surgical History:   Procedure Laterality Date   • COLONOSCOPY  04/2016   • OOPHORECTOMY Bilateral 1995   • THYROIDECTOMY  2005   • TOTAL ABDOMINAL HYSTERECTOMY  1995      Family History   Problem Relation Age of Onset   • Crohn's disease Mother    • Breast cancer Mother 85   • Diabetes Father    • Heart failure Father      congestive   • Crohn's disease Sister    • Thyroid cancer Sister    • Thyroid cancer Sister    • Diabetes Sister    • Ovarian cancer Neg Hx      Social History     Social History   • Marital status:      Spouse name: N/A   • Number of children: N/A   • Years of  education: N/A     Occupational History   • Not on file.     Social History Main Topics   • Smoking status: Former Smoker   • Smokeless tobacco: Never Used      Comment: quit about 32 years ago   • Alcohol use No   • Drug use: No   • Sexual activity: Defer     Other Topics Concern   • Not on file     Social History Narrative      Current Outpatient Prescriptions on File Prior to Visit   Medication Sig Dispense Refill   • albuterol (PROVENTIL HFA) 108 (90 BASE) MCG/ACT inhaler Inhale 2 puffs every 4 (four) hours as needed for wheezing.     • atenolol (TENORMIN) 25 MG tablet Take 1 tablet by mouth Daily. 90 tablet 1   • budesonide-formoterol (SYMBICORT) 80-4.5 MCG/ACT inhaler Inhale 2 puffs 2 (two) times a day.     • diclofenac (VOLTAREN) 75 MG EC tablet TAKE ONE TABLET BY MOUTH TWICE A DAY AS NEEDED FOR PAIN 180 tablet 0   • gabapentin (NEURONTIN) 300 MG capsule Take 300 mg by mouth 3 (Three) Times a Day. 2 po qhs     • glucose blood test strip 1 po bid as directed- One touch ultra Blue 100 100 each 12   • glucose monitor monitoring kit 1 each Daily. 1 each 0   • Lancets misc 1 each Daily. 60 each 11   • levothyroxine (SYNTHROID, LEVOTHROID) 150 MCG tablet Take 150 mcg by mouth daily.     • losartan (COZAAR) 25 MG tablet TAKE ONE TABLET BY MOUTH DAILY 90 tablet 0   • metFORMIN (GLUCOPHAGE) 500 MG tablet Take 1 tablet by mouth Daily. 90 tablet 1   • omeprazole (priLOSEC) 20 MG capsule TAKE ONE CAPSULE BY MOUTH TWICE A DAY WITH DICLOFENAC 180 capsule 3   • triamcinolone (KENALOG) 0.1 % cream Apply  topically 2 (Two) Times a Day. 45 g 2   • triamterene-hydrochlorothiazide (MAXZIDE-25) 37.5-25 MG per tablet Take 1 tablet by mouth Daily. 90 tablet 1     No current facility-administered medications on file prior to visit.       Allergies   Allergen Reactions   • Latex Angioedema   • Penicillins         Review of Systems   Constitutional: Negative.    HENT: Negative.    Eyes: Negative.    Respiratory: Negative.   "  Cardiovascular: Negative.    Gastrointestinal: Negative.    Endocrine: Negative.    Genitourinary: Negative.    Musculoskeletal: Negative.         Joint Pain    Skin: Negative.    Allergic/Immunologic: Negative.    Neurological: Negative.    Hematological: Negative.    Psychiatric/Behavioral: Negative.         The following portions of the patient's history were reviewed and updated as appropriate: allergies, current medications, past family history, past medical history, past social history, past surgical history and problem list.    Physical Exam  Blood pressure 139/94, pulse 66, height 167 cm (65.75\"), weight 105 kg (232 lb 2.3 oz).    Body mass index is 37.76 kg/(m^2).    GENERAL APPEARANCE: awake, alert & oriented x 3, in no acute distress, well developed, well nourished and obese  PSYCH: normal affect  LUNGS:  breathing nonlabored  EYES: sclera anicteric  CARDIOVASCULAR: palpable dorsalis pedis, palpable posterior tibial bilaterally. Capillary refill less than 2 seconds  EXTREMITIES: no clubbing, cyanosis  GAIT:  Antalgic            Right Lower Extremity Exam:   ----------  Hip Exam  ----------  FLEXION CONTRACTURE: None  FLEXION: 110 degrees  INTERNAL ROTATION: 20 degrees at 90 degrees of flexion   EXTERNAL ROTATION: 40 degrees at 90 degrees of flexion    PAIN WITH HIP MOTION: no  ----------  Knee Exam  ----------  ALIGNMENT: 5 degrees valgus, correctible to 2 degrees valgus    RANGE OF MOTION:  Decreased (5 - 120 degrees)   LIGAMENTOUS STABILITY:   stable to varus and valgus stress at terminal extension and 30 degrees; slight retensioning of the LCL is appreciated with varus stress at 30 degrees consistent with lateral compartment degeneration     STRENGTH:  4/5 knee flexion, extension. 5/5 ankle dorsiflexion and plantarflexion.     PAIN WITH PALPATION: global  KNEE EFFUSION: yes, mild effusion  PAIN WITH KNEE ROM: yes, globally   PATELLAR CREPITUS: yes, painful and symptomatic  SPECIAL EXAM FINDINGS:  " none    REFLEXES:  PATELLAR 2+/4  ACHILLES 2+/4    CLONUS: no  STRAIGHT LEG TEST:   negative    SENSATION TO LIGHT TOUCH:  DEEP PERONEAL/SUPERFICIAL PERONEAL/SURAL/SAPHENOUS/TIBIAL:   intact    EDEMA:  no  ERYTHEMA:  no  WOUNDS/INCISIONS: no        Left Lower Extremity Exam:   ----------  Hip Exam  ----------  FLEXION CONTRACTURE: None  FLEXION: 110 degrees  INTERNAL ROTATION: 20 degrees at 90 degrees of flexion   EXTERNAL ROTATION: 40 degrees at 90 degrees of flexion    PAIN WITH HIP MOTION: no  ----------  Knee Exam  ----------  ALIGNMENT: 5 degrees valgus, correctible to 1degrees valgus    RANGE OF MOTION:  Decreased (3 - 120 degrees)   LIGAMENTOUS STABILITY:   stable to varus and valgus stress at terminal extension and 30 degrees; slight retensioning of the LCL is appreciated with varus stress at 30 degrees consistent with lateral compartment degeneration     STRENGTH:  4/5 knee flexion, extension. 5/5 ankle dorsiflexion and plantarflexion.     PAIN WITH PALPATION: global  KNEE EFFUSION: yes, mild effusion  PAIN WITH KNEE ROM: yes, globally   PATELLAR CREPITUS: yes, painful and symptomatic  SPECIAL EXAM FINDINGS:  none    REFLEXES:  PATELLAR 2+/4  ACHILLES 2+/4    CLONUS: no  STRAIGHT LEG TEST:   negative    SENSATION TO LIGHT TOUCH:  DEEP PERONEAL/SUPERFICIAL PERONEAL/SURAL/SAPHENOUS/TIBIAL:   intact    EDEMA:  no  ERYTHEMA:  no  WOUNDS/INCISIONS: no    ______________________________________________________________________  ______________________________________________________________________    RADIOGRAPHIC FINDINGS:   Indication: Bilateral knee pain    Comparison: No prior xrays are available for comparison    Right knee(s) 4 views: Severe tricompartmental osteoarthritis with advanced valgus osteoarthritis with lateral compartment bone on bone articulation, subchondral sclerosis, and periarticular osteophytes  periarticular osteophytes visualized in all compartments  Left knee(s) 4 views: Severe  tricompartmental osteoarthritis with advanced valgus osteoarthritis with lateral compartment bone on bone articulation, subchondral sclerosis, and periarticular osteophytes  periarticular osteophytes visualized in all compartments      Assessment/Plan:   Diagnosis Plan   1. Pain  XR Knee 4+ View Bilateral   2. Primary osteoarthritis of both knees     3. Obesity (BMI 30-39.9)       Patient has severe osteoarthritis affecting the bilateral knees.  She has failed conservative treatment with oral anti-inflammatories.  Given the severity of her symptoms, I do not believe that further conservative intervention is going to provide any significant improvement in her symptoms.  As a result, I recommended surgical intervention for total knee arthroplasties of the bilateral knees in staged fashion.  She is agreeable to this plan.  She wishes to proceed first with a left total knee arthroplasty.  Provided that that surgery goes well, I will see her back 6 weeks postop at which point we will discuss proceeding with a right total knee arthroplasty.    The patient has clinical and radiographic evidence of end-stage left knee joint degeneration. Conservative measures have been tried for 3 months or longer, but have failed to adequately treat or improve the patient's symptoms. Pain is restricting the patient's daily activities as well as quality of life. The recommendation at this time is to proceed with a left total knee arthroplasty with the goal to improve patient function and pain. The risks, benefits, potential complications, and alternatives were discussed with the patient in detail. Risks included but were not limited to bleeding, infection, anesthesia risks, damage to neurovascular structures, osteolysis, aseptic loosening, instability, dislocation, pain, continued pain, iatrogenic fracture, possible peroneal nerve palsy from correction of valgus deformity and flexion contracture, possible need for future surgery including  the potential for amputation, blood clots, myocardial infarction, stroke, and death. Glenna-operative blood management and the potential for blood transfusion were discussed with risks and options clearly outlined. Specific details of the surgical procedure, hospitalization, recovery, rehabilitation, and long-term precautions were also presented. Pre-operative teaching was provided. Implant/prosthesis selection was outlined, and the many options available were explained; the final choice will be made at the time of the procedure to match the anatomy and condition of the bone, ligaments, tendons, and muscles. Given this instruction, the patient elected to proceed with the left total knee arthroplasty. The patient will be seen by pre-admission testing for pre-operative optimization and risk assessment and will be scheduled for surgery once this is completed.    The patient is considered standard risk for DVT based on patient risk factors and will be placed on aspirin postoperatively for DVT prophylaxis.    Elio Quinones MD  01/30/18  10:58 AM

## 2018-02-01 PROBLEM — M17.0 PRIMARY OSTEOARTHRITIS OF BOTH KNEES: Status: ACTIVE | Noted: 2018-02-01

## 2018-02-06 DIAGNOSIS — I10 ESSENTIAL HYPERTENSION: ICD-10-CM

## 2018-02-07 ENCOUNTER — TELEPHONE (OUTPATIENT)
Dept: INTERNAL MEDICINE | Facility: CLINIC | Age: 63
End: 2018-02-07

## 2018-02-07 RX ORDER — TRIAMTERENE AND HYDROCHLOROTHIAZIDE 37.5; 25 MG/1; MG/1
TABLET ORAL
Qty: 90 TABLET | Refills: 1 | Status: SHIPPED | OUTPATIENT
Start: 2018-02-07 | End: 2019-03-07

## 2018-02-07 NOTE — TELEPHONE ENCOUNTER
----- Message from GUSTAVO Marcelino sent at 2/5/2018  9:53 AM EST -----  Please let Livier know that she is slightly anemic.  I would like to ck some iron studies.  All other labs are stable.

## 2018-02-08 ENCOUNTER — TELEPHONE (OUTPATIENT)
Dept: ORTHOPEDIC SURGERY | Facility: CLINIC | Age: 63
End: 2018-02-08

## 2018-02-08 ENCOUNTER — APPOINTMENT (OUTPATIENT)
Dept: PREADMISSION TESTING | Facility: HOSPITAL | Age: 63
End: 2018-02-08

## 2018-02-08 NOTE — TELEPHONE ENCOUNTER
PATIENT CALLED TO CANCEL L TKA SCHEDULED ON 2/22/18 WITH DR. VILLALTA.  HAS SOME DENTAL WORK THAT NEEDS TO BE DONE BEFORE DENTIST WILL CLEAR FOR SURGERY.  SHE WILL CALL BACK TO RESCHEDULE ONCE THAT HAS BEEN COMPLETED.

## 2018-02-12 DIAGNOSIS — D64.9 ANEMIA, UNSPECIFIED TYPE: Primary | ICD-10-CM

## 2018-04-09 DIAGNOSIS — M54.12 CERVICAL RADICULOPATHY: ICD-10-CM

## 2018-04-09 DIAGNOSIS — M25.511 RIGHT SHOULDER PAIN, UNSPECIFIED CHRONICITY: ICD-10-CM

## 2018-04-09 RX ORDER — DICLOFENAC SODIUM 75 MG/1
TABLET, DELAYED RELEASE ORAL
Qty: 180 TABLET | Refills: 0 | Status: SHIPPED | OUTPATIENT
Start: 2018-04-09 | End: 2018-07-10 | Stop reason: SDUPTHER

## 2018-04-16 DIAGNOSIS — I10 ESSENTIAL HYPERTENSION: ICD-10-CM

## 2018-04-16 DIAGNOSIS — E11.9 TYPE 2 DIABETES MELLITUS WITHOUT COMPLICATION, WITHOUT LONG-TERM CURRENT USE OF INSULIN (HCC): ICD-10-CM

## 2018-04-17 RX ORDER — LOSARTAN POTASSIUM 25 MG/1
TABLET ORAL
Qty: 90 TABLET | Refills: 0 | Status: SHIPPED | OUTPATIENT
Start: 2018-04-17 | End: 2018-07-26 | Stop reason: SDUPTHER

## 2018-05-15 RX ORDER — OMEPRAZOLE 20 MG/1
CAPSULE, DELAYED RELEASE ORAL
Qty: 180 CAPSULE | Refills: 2 | Status: SHIPPED | OUTPATIENT
Start: 2018-05-15 | End: 2019-08-02 | Stop reason: SDUPTHER

## 2018-07-08 DIAGNOSIS — M25.511 RIGHT SHOULDER PAIN, UNSPECIFIED CHRONICITY: ICD-10-CM

## 2018-07-08 DIAGNOSIS — E11.9 TYPE 2 DIABETES MELLITUS WITHOUT COMPLICATION, WITHOUT LONG-TERM CURRENT USE OF INSULIN (HCC): ICD-10-CM

## 2018-07-08 DIAGNOSIS — I10 ESSENTIAL HYPERTENSION: ICD-10-CM

## 2018-07-08 DIAGNOSIS — M54.12 CERVICAL RADICULOPATHY: ICD-10-CM

## 2018-07-09 RX ORDER — DICLOFENAC SODIUM 75 MG/1
TABLET, DELAYED RELEASE ORAL
Qty: 180 TABLET | Refills: 0 | OUTPATIENT
Start: 2018-07-09

## 2018-07-09 RX ORDER — LOSARTAN POTASSIUM 25 MG/1
TABLET ORAL
Qty: 90 TABLET | Refills: 0 | OUTPATIENT
Start: 2018-07-09

## 2018-07-10 ENCOUNTER — TELEPHONE (OUTPATIENT)
Dept: INTERNAL MEDICINE | Facility: CLINIC | Age: 63
End: 2018-07-10

## 2018-07-10 DIAGNOSIS — I10 ESSENTIAL HYPERTENSION: ICD-10-CM

## 2018-07-10 DIAGNOSIS — M25.511 RIGHT SHOULDER PAIN, UNSPECIFIED CHRONICITY: ICD-10-CM

## 2018-07-10 DIAGNOSIS — E11.9 TYPE 2 DIABETES MELLITUS WITHOUT COMPLICATION, WITHOUT LONG-TERM CURRENT USE OF INSULIN (HCC): ICD-10-CM

## 2018-07-10 DIAGNOSIS — M54.12 CERVICAL RADICULOPATHY: ICD-10-CM

## 2018-07-10 RX ORDER — ATENOLOL 25 MG/1
25 TABLET ORAL DAILY
Qty: 30 TABLET | Refills: 0 | Status: SHIPPED | OUTPATIENT
Start: 2018-07-10 | End: 2018-07-26 | Stop reason: SDUPTHER

## 2018-07-10 RX ORDER — DICLOFENAC SODIUM 75 MG/1
75 TABLET, DELAYED RELEASE ORAL 2 TIMES DAILY PRN
Qty: 60 TABLET | Refills: 0 | Status: SHIPPED | OUTPATIENT
Start: 2018-07-10 | End: 2018-08-07 | Stop reason: SDUPTHER

## 2018-07-10 NOTE — TELEPHONE ENCOUNTER
30 days sent in. This is enough to get her to her appt on 7/26/18. We will refill then for 90 days and refills.

## 2018-07-26 ENCOUNTER — OFFICE VISIT (OUTPATIENT)
Dept: INTERNAL MEDICINE | Facility: CLINIC | Age: 63
End: 2018-07-26

## 2018-07-26 VITALS
WEIGHT: 215.8 LBS | TEMPERATURE: 97.8 F | DIASTOLIC BLOOD PRESSURE: 82 MMHG | OXYGEN SATURATION: 99 % | RESPIRATION RATE: 16 BRPM | BODY MASS INDEX: 34.68 KG/M2 | SYSTOLIC BLOOD PRESSURE: 148 MMHG | HEART RATE: 66 BPM | HEIGHT: 66 IN

## 2018-07-26 DIAGNOSIS — I10 ESSENTIAL HYPERTENSION: ICD-10-CM

## 2018-07-26 DIAGNOSIS — M25.562 PAIN IN BOTH KNEES, UNSPECIFIED CHRONICITY: ICD-10-CM

## 2018-07-26 DIAGNOSIS — C73 MALIGNANT NEOPLASM OF THYROID GLAND (HCC): ICD-10-CM

## 2018-07-26 DIAGNOSIS — E55.9 VITAMIN D DEFICIENCY: ICD-10-CM

## 2018-07-26 DIAGNOSIS — K50.919 CROHN'S DISEASE WITH COMPLICATION, UNSPECIFIED GASTROINTESTINAL TRACT LOCATION (HCC): ICD-10-CM

## 2018-07-26 DIAGNOSIS — M25.561 PAIN IN BOTH KNEES, UNSPECIFIED CHRONICITY: ICD-10-CM

## 2018-07-26 DIAGNOSIS — E11.9 TYPE 2 DIABETES MELLITUS WITHOUT COMPLICATION, WITHOUT LONG-TERM CURRENT USE OF INSULIN (HCC): Primary | ICD-10-CM

## 2018-07-26 DIAGNOSIS — E78.5 HYPERLIPIDEMIA, UNSPECIFIED HYPERLIPIDEMIA TYPE: ICD-10-CM

## 2018-07-26 LAB — HBA1C MFR BLD: 5.8 %

## 2018-07-26 PROCEDURE — 83036 HEMOGLOBIN GLYCOSYLATED A1C: CPT | Performed by: NURSE PRACTITIONER

## 2018-07-26 PROCEDURE — 99214 OFFICE O/P EST MOD 30 MIN: CPT | Performed by: NURSE PRACTITIONER

## 2018-07-26 RX ORDER — BUDESONIDE AND FORMOTEROL FUMARATE DIHYDRATE 80; 4.5 UG/1; UG/1
2 AEROSOL RESPIRATORY (INHALATION)
Qty: 1 INHALER | Refills: 5 | Status: SHIPPED | OUTPATIENT
Start: 2018-07-26 | End: 2022-07-21 | Stop reason: SDUPTHER

## 2018-07-26 RX ORDER — ALBUTEROL SULFATE 90 UG/1
2 AEROSOL, METERED RESPIRATORY (INHALATION) EVERY 4 HOURS PRN
Qty: 1 INHALER | Refills: 2 | Status: SHIPPED | OUTPATIENT
Start: 2018-07-26 | End: 2020-10-09 | Stop reason: SDUPTHER

## 2018-07-26 RX ORDER — ATENOLOL 25 MG/1
25 TABLET ORAL DAILY
Qty: 90 TABLET | Refills: 1 | Status: SHIPPED | OUTPATIENT
Start: 2018-07-26 | End: 2019-08-01 | Stop reason: SDUPTHER

## 2018-07-26 RX ORDER — LOSARTAN POTASSIUM 25 MG/1
25 TABLET ORAL DAILY
Qty: 90 TABLET | Refills: 1 | Status: SHIPPED | OUTPATIENT
Start: 2018-07-26 | End: 2019-03-07

## 2018-07-26 NOTE — PROGRESS NOTES
Subjective   Livier Gutierrez is a 63 y.o. female    Chief Complaint   Patient presents with   • Follow-up   • Diabetes     Due for A1C   • Hypertension     Has been without the Losartan . Only took the Atenolol last night.   • Hyperlipidemia   • Med Refill     History of Present Illness     DM II - does not ck FS @ home; currently on 500mg of Metformin daily; A1C was 5.8%       Has seen podiatry in the past for BLE N/T.  An EMG of her LE's was ordered for BLE pain, N/T.  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  She was started on Gabapentin.  Cannot tolerate a higher dose than 2 PO QHS.  Does feel that it has helped with her sx's and she denies any med SE's.        HTN - chronic and stable on Cozaar, Atenolol and HCTZ.  She has not had her meds today so BP is elevated in office.  Generally runs much lower at home.        HL - has been off of her pravastatin for a while; made her feel terrible - she is not fasting but is due for labs.      H/O thyroid CA - managed by oncologist at  currently has her on 150mcg; Let her go to the [facility]. She is now seeing her every 6 months.        GERD is chronic and stable on Omeprazole, but only takes it PRN      Allergies are doing ok. Taking Claritin everyday, but likes to have an albuterol inhaler on hand for wheezing/SOA.       Bilateral knee pain - needs bilateral knee replacement, but is trying to hold out. Takes Diclofenac PRN for pain.  Has not seen Ortho in several years.  I am concerned as she can barely walk through the office today.  Using a cane.  She has seen Ortho, Dr. Quinones and had surgery planned but cancelled.  She would like a referral to Dr. Lauren.        Flu shot 10/17  Pneumovax 10/13   Zostavax, declines  Prevnar - 4/19/16  Tdap 2012   DEXA - 10/14 (normal)   Ovarian Ca screening; [facility]   Mamm 12/19/17  Pap - 7/2014; no longer doing ([facility])  Colon 2016 ([facility]); repeating in 1 year this time, he found some mild crohns - she called is  office and since she is sx free, he is going to hold off on a colon for now.    Eye exam - 1/2018  Foot exam 2/2018 per podiatry    The following portions of the patient's history were reviewed and updated as appropriate: allergies, current medications, past family history, past medical history, past social history, past surgical history and problem list.    Current Outpatient Prescriptions:   •  albuterol (PROVENTIL HFA) 108 (90 Base) MCG/ACT inhaler, Inhale 2 puffs Every 4 (Four) Hours As Needed for Wheezing., Disp: 1 inhaler, Rfl: 2  •  aspirin 81 MG tablet, Take 1 tablet by mouth Daily., Disp: 90 tablet, Rfl: 3  •  atenolol (TENORMIN) 25 MG tablet, Take 1 tablet by mouth Daily., Disp: 90 tablet, Rfl: 1  •  budesonide-formoterol (SYMBICORT) 80-4.5 MCG/ACT inhaler, Inhale 2 puffs 2 (Two) Times a Day., Disp: 1 inhaler, Rfl: 5  •  diclofenac (VOLTAREN) 75 MG EC tablet, Take 1 tablet by mouth 2 (Two) Times a Day As Needed (pain)., Disp: 60 tablet, Rfl: 0  •  glucose blood test strip, 1 po bid as directed- One touch ultra Blue 100, Disp: 100 each, Rfl: 12  •  glucose monitor monitoring kit, 1 each Daily., Disp: 1 each, Rfl: 0  •  Lancets misc, 1 each Daily., Disp: 60 each, Rfl: 11  •  levothyroxine (SYNTHROID, LEVOTHROID) 150 MCG tablet, Take 150 mcg by mouth daily., Disp: , Rfl:   •  losartan (COZAAR) 25 MG tablet, Take 1 tablet by mouth Daily., Disp: 90 tablet, Rfl: 1  •  metFORMIN (GLUCOPHAGE) 500 MG tablet, Take 1 tablet by mouth Daily., Disp: 90 tablet, Rfl: 1  •  omeprazole (priLOSEC) 20 MG capsule, TAKE ONE CAPSULE BY MOUTH TWICE A DAY WITH DICLOFENAC, Disp: 180 capsule, Rfl: 2  •  triamcinolone (KENALOG) 0.1 % cream, Apply  topically 2 (Two) Times a Day., Disp: 45 g, Rfl: 2  •  triamterene-hydrochlorothiazide (MAXZIDE-25) 37.5-25 MG per tablet, TAKE ONE TABLET BY MOUTH DAILY, Disp: 90 tablet, Rfl: 1     Review of Systems   Constitutional: Negative for chills, fatigue and fever.   Respiratory: Negative for  "cough, chest tightness and shortness of breath.    Cardiovascular: Negative for chest pain.   Gastrointestinal: Negative for abdominal pain, diarrhea, nausea and vomiting.   Endocrine: Negative for cold intolerance and heat intolerance.   Musculoskeletal: Positive for arthralgias and gait problem.   Neurological: Negative for dizziness.       Objective   Physical Exam   Constitutional: She is oriented to person, place, and time. She appears well-developed and well-nourished.   HENT:   Head: Normocephalic and atraumatic.   Eyes: Pupils are equal, round, and reactive to light. Conjunctivae and EOM are normal.   Neck: Normal range of motion.   Cardiovascular: Normal rate, regular rhythm and normal heart sounds.    Pulmonary/Chest: Effort normal and breath sounds normal.   Abdominal: Soft. Bowel sounds are normal.   Musculoskeletal:        Right knee: She exhibits decreased range of motion, swelling and effusion. She exhibits no ecchymosis, no deformity, no laceration, no erythema, normal alignment, no LCL laxity, normal patellar mobility and no bony tenderness. No tenderness found.        Left knee: She exhibits decreased range of motion, swelling and effusion. She exhibits no ecchymosis, no deformity, no laceration, no erythema, normal alignment, no LCL laxity, normal patellar mobility and no bony tenderness. No tenderness found.   Neurological: She is alert and oriented to person, place, and time. She has normal reflexes.   Skin: Skin is warm and dry.   Psychiatric: She has a normal mood and affect. Her behavior is normal. Judgment and thought content normal.     Vitals:    07/26/18 1359   BP: 148/82   Pulse: 66   Resp: 16   Temp: 97.8 °F (36.6 °C)   TempSrc: Temporal Artery    SpO2: 99%   Weight: 97.9 kg (215 lb 12.8 oz)   Height: 168.9 cm (66.5\")         Assessment/Plan   Livier was seen today for follow-up, diabetes, hypertension, hyperlipidemia and med refill.    Diagnoses and all orders for this visit:    Type 2 " diabetes mellitus without complication, without long-term current use of insulin (CMS/[facility])  -     POC Glycosylated Hemoglobin (Hb A1C)  -     metFORMIN (GLUCOPHAGE) 500 MG tablet; Take 1 tablet by mouth Daily.  -     CBC & Differential; Future  -     Comprehensive Metabolic Panel; Future  -     Lipid Panel; Future  -     Vitamin D 25 Hydroxy; Future  -     Vitamin B12; Future  -     TSH; Future    Malignant neoplasm of thyroid gland (CMS/[facility])  -     CBC & Differential; Future  -     Comprehensive Metabolic Panel; Future  -     Lipid Panel; Future  -     Vitamin D 25 Hydroxy; Future  -     Vitamin B12; Future  -     TSH; Future    Crohn's disease with complication, unspecified gastrointestinal tract location (CMS/[facility])  -     CBC & Differential; Future  -     Comprehensive Metabolic Panel; Future  -     Lipid Panel; Future  -     Vitamin D 25 Hydroxy; Future  -     Vitamin B12; Future  -     TSH; Future    Essential hypertension  -     losartan (COZAAR) 25 MG tablet; Take 1 tablet by mouth Daily.  -     atenolol (TENORMIN) 25 MG tablet; Take 1 tablet by mouth Daily.  -     CBC & Differential; Future  -     Comprehensive Metabolic Panel; Future  -     Lipid Panel; Future  -     Vitamin D 25 Hydroxy; Future  -     Vitamin B12; Future  -     TSH; Future    Hyperlipidemia, unspecified hyperlipidemia type  -     CBC & Differential; Future  -     Comprehensive Metabolic Panel; Future  -     Lipid Panel; Future  -     Vitamin D 25 Hydroxy; Future  -     Vitamin B12; Future  -     TSH; Future    Vitamin D deficiency   -     Vitamin D 25 Hydroxy; Future    Pain in both knees, unspecified chronicity  -     Ambulatory Referral to Orthopedic Surgery    Other orders  -     aspirin 81 MG tablet; Take 1 tablet by mouth Daily.  -     albuterol (PROVENTIL HFA) 108 (90 Base) MCG/ACT inhaler; Inhale 2 puffs Every 4 (Four) Hours As Needed for Wheezing.  -     budesonide-formoterol (SYMBICORT) 80-4.5 MCG/ACT inhaler; Inhale 2 puffs 2  (Two) Times a Day.    Pt is not fasting, so she will RTC for labs  Meds refilled  Referral placed to Xin for knee pain, requests 2nd opinion  No med changes  Return in about 6 months (around 1/26/2019) for Medicare Wellness.

## 2018-08-07 DIAGNOSIS — M54.12 CERVICAL RADICULOPATHY: ICD-10-CM

## 2018-08-07 DIAGNOSIS — M25.511 RIGHT SHOULDER PAIN, UNSPECIFIED CHRONICITY: ICD-10-CM

## 2018-08-07 RX ORDER — DICLOFENAC SODIUM 75 MG/1
TABLET, DELAYED RELEASE ORAL
Qty: 60 TABLET | Refills: 5 | Status: SHIPPED | OUTPATIENT
Start: 2018-08-07 | End: 2019-02-13 | Stop reason: SDUPTHER

## 2018-09-21 ENCOUNTER — LAB (OUTPATIENT)
Dept: INTERNAL MEDICINE | Facility: CLINIC | Age: 63
End: 2018-09-21

## 2018-09-21 DIAGNOSIS — K50.919 CROHN'S DISEASE WITH COMPLICATION, UNSPECIFIED GASTROINTESTINAL TRACT LOCATION (HCC): ICD-10-CM

## 2018-09-21 DIAGNOSIS — E78.5 HYPERLIPIDEMIA, UNSPECIFIED HYPERLIPIDEMIA TYPE: ICD-10-CM

## 2018-09-21 DIAGNOSIS — E55.9 VITAMIN D DEFICIENCY: ICD-10-CM

## 2018-09-21 DIAGNOSIS — I10 ESSENTIAL HYPERTENSION: ICD-10-CM

## 2018-09-21 DIAGNOSIS — C73 MALIGNANT NEOPLASM OF THYROID GLAND (HCC): ICD-10-CM

## 2018-09-21 DIAGNOSIS — E11.9 TYPE 2 DIABETES MELLITUS WITHOUT COMPLICATION, WITHOUT LONG-TERM CURRENT USE OF INSULIN (HCC): ICD-10-CM

## 2018-09-21 LAB
25(OH)D3+25(OH)D2 SERPL-MCNC: 46.2 NG/ML
ALBUMIN SERPL-MCNC: 3.77 G/DL (ref 3.2–4.8)
ALBUMIN/GLOB SERPL: 1.6 G/DL (ref 1.5–2.5)
ALP SERPL-CCNC: 42 U/L (ref 25–100)
ALT SERPL-CCNC: 20 U/L (ref 7–40)
AST SERPL-CCNC: 21 U/L (ref 0–33)
BASOPHILS # BLD AUTO: 0.05 10*3/MM3 (ref 0–0.2)
BASOPHILS NFR BLD AUTO: 0.6 % (ref 0–1)
BILIRUB SERPL-MCNC: 0.4 MG/DL (ref 0.3–1.2)
BUN SERPL-MCNC: 18 MG/DL (ref 9–23)
BUN/CREAT SERPL: 17 (ref 7–25)
CALCIUM SERPL-MCNC: 8.4 MG/DL (ref 8.7–10.4)
CHLORIDE SERPL-SCNC: 103 MMOL/L (ref 99–109)
CHOLEST SERPL-MCNC: 151 MG/DL (ref 0–200)
CO2 SERPL-SCNC: 31 MMOL/L (ref 20–31)
CREAT SERPL-MCNC: 1.06 MG/DL (ref 0.6–1.3)
EOSINOPHIL # BLD AUTO: 0.23 10*3/MM3 (ref 0–0.3)
EOSINOPHIL NFR BLD AUTO: 2.8 % (ref 0–3)
ERYTHROCYTE [DISTWIDTH] IN BLOOD BY AUTOMATED COUNT: 17.3 % (ref 11.3–14.5)
GLOBULIN SER CALC-MCNC: 2.4 GM/DL
GLUCOSE SERPL-MCNC: 73 MG/DL (ref 70–100)
HCT VFR BLD AUTO: 36.6 % (ref 34.5–44)
HDLC SERPL-MCNC: 37 MG/DL (ref 40–60)
HGB BLD-MCNC: 11.4 G/DL (ref 11.5–15.5)
IMM GRANULOCYTES # BLD: 0.02 10*3/MM3 (ref 0–0.03)
IMM GRANULOCYTES NFR BLD: 0.2 % (ref 0–0.6)
LDLC SERPL CALC-MCNC: 94 MG/DL (ref 0–100)
LYMPHOCYTES # BLD AUTO: 1.91 10*3/MM3 (ref 0.6–4.8)
LYMPHOCYTES NFR BLD AUTO: 23.3 % (ref 24–44)
MCH RBC QN AUTO: 23.3 PG (ref 27–31)
MCHC RBC AUTO-ENTMCNC: 31.1 G/DL (ref 32–36)
MCV RBC AUTO: 74.7 FL (ref 80–99)
MONOCYTES # BLD AUTO: 0.73 10*3/MM3 (ref 0–1)
MONOCYTES NFR BLD AUTO: 8.9 % (ref 0–12)
NEUTROPHILS # BLD AUTO: 5.25 10*3/MM3 (ref 1.5–8.3)
NEUTROPHILS NFR BLD AUTO: 64.2 % (ref 41–71)
PLATELET # BLD AUTO: 322 10*3/MM3 (ref 150–450)
POTASSIUM SERPL-SCNC: 4.1 MMOL/L (ref 3.5–5.5)
PROT SERPL-MCNC: 6.2 G/DL (ref 5.7–8.2)
RBC # BLD AUTO: 4.9 10*6/MM3 (ref 3.89–5.14)
SODIUM SERPL-SCNC: 138 MMOL/L (ref 132–146)
TRIGL SERPL-MCNC: 101 MG/DL (ref 0–150)
TSH SERPL DL<=0.005 MIU/L-ACNC: 0.62 MIU/ML (ref 0.35–5.35)
VIT B12 SERPL-MCNC: >2000 PG/ML (ref 211–911)
VLDLC SERPL CALC-MCNC: 20.2 MG/DL
WBC # BLD AUTO: 8.19 10*3/MM3 (ref 3.5–10.8)

## 2019-02-01 ENCOUNTER — OFFICE VISIT (OUTPATIENT)
Dept: INTERNAL MEDICINE | Facility: CLINIC | Age: 64
End: 2019-02-01

## 2019-02-01 VITALS
WEIGHT: 203 LBS | DIASTOLIC BLOOD PRESSURE: 88 MMHG | HEIGHT: 66 IN | TEMPERATURE: 98 F | RESPIRATION RATE: 18 BRPM | SYSTOLIC BLOOD PRESSURE: 132 MMHG | BODY MASS INDEX: 32.62 KG/M2 | HEART RATE: 68 BPM | OXYGEN SATURATION: 97 %

## 2019-02-01 DIAGNOSIS — E11.9 TYPE 2 DIABETES MELLITUS WITHOUT COMPLICATION, WITHOUT LONG-TERM CURRENT USE OF INSULIN (HCC): ICD-10-CM

## 2019-02-01 DIAGNOSIS — I10 ESSENTIAL HYPERTENSION: ICD-10-CM

## 2019-02-01 DIAGNOSIS — K50.919 CROHN'S DISEASE WITH COMPLICATION, UNSPECIFIED GASTROINTESTINAL TRACT LOCATION (HCC): ICD-10-CM

## 2019-02-01 DIAGNOSIS — M17.0 PRIMARY OSTEOARTHRITIS OF BOTH KNEES: ICD-10-CM

## 2019-02-01 DIAGNOSIS — E55.9 VITAMIN D DEFICIENCY: ICD-10-CM

## 2019-02-01 DIAGNOSIS — E78.5 HYPERLIPIDEMIA, UNSPECIFIED HYPERLIPIDEMIA TYPE: ICD-10-CM

## 2019-02-01 DIAGNOSIS — Z23 NEED FOR PNEUMOCOCCAL VACCINATION: ICD-10-CM

## 2019-02-01 DIAGNOSIS — Z00.00 ROUTINE GENERAL MEDICAL EXAMINATION AT A HEALTH CARE FACILITY: ICD-10-CM

## 2019-02-01 DIAGNOSIS — C73 MALIGNANT NEOPLASM OF THYROID GLAND (HCC): ICD-10-CM

## 2019-02-01 DIAGNOSIS — Z00.00 MEDICARE ANNUAL WELLNESS VISIT, SUBSEQUENT: Primary | ICD-10-CM

## 2019-02-01 DIAGNOSIS — Z78.0 POST-MENOPAUSAL: ICD-10-CM

## 2019-02-01 DIAGNOSIS — Z23 FLU VACCINE NEED: ICD-10-CM

## 2019-02-01 LAB
EXPIRATION DATE: NORMAL
HBA1C MFR BLD: 5.6 %
Lab: NORMAL

## 2019-02-01 PROCEDURE — G0439 PPPS, SUBSEQ VISIT: HCPCS | Performed by: NURSE PRACTITIONER

## 2019-02-01 PROCEDURE — 99397 PER PM REEVAL EST PAT 65+ YR: CPT | Performed by: NURSE PRACTITIONER

## 2019-02-01 PROCEDURE — 96160 PT-FOCUSED HLTH RISK ASSMT: CPT | Performed by: NURSE PRACTITIONER

## 2019-02-01 PROCEDURE — G0009 ADMIN PNEUMOCOCCAL VACCINE: HCPCS | Performed by: NURSE PRACTITIONER

## 2019-02-01 PROCEDURE — 90732 PPSV23 VACC 2 YRS+ SUBQ/IM: CPT | Performed by: NURSE PRACTITIONER

## 2019-02-01 PROCEDURE — G0008 ADMIN INFLUENZA VIRUS VAC: HCPCS | Performed by: NURSE PRACTITIONER

## 2019-02-01 PROCEDURE — 90662 IIV NO PRSV INCREASED AG IM: CPT | Performed by: NURSE PRACTITIONER

## 2019-02-01 PROCEDURE — 83036 HEMOGLOBIN GLYCOSYLATED A1C: CPT | Performed by: NURSE PRACTITIONER

## 2019-02-01 NOTE — PROGRESS NOTES
QUICK REFERENCE INFORMATION:  The ABCs of the Annual Wellness Visit    Subsequent Medicare Wellness Visit    HEALTH RISK ASSESSMENT    1955    Recent Hospitalizations:  No hospitalization(s) within the last year..        Current Medical Providers:  Patient Care Team:  Tracey Smith APRN as PCP - General (Family Medicine)  Provider, No Known as PCP - Family Medicine        Smoking Status:  Social History     Tobacco Use   Smoking Status Former Smoker   Smokeless Tobacco Never Used   Tobacco Comment    quit about 32 years ago       Alcohol Consumption:  Social History     Substance and Sexual Activity   Alcohol Use No       Depression Screen:   PHQ-2/PHQ-9 Depression Screening 2/1/2019   Little interest or pleasure in doing things 0   Feeling down, depressed, or hopeless 0   Total Score 0       Health Habits and Functional and Cognitive Screening:  Functional & Cognitive Status 2/1/2019   Do you have difficulty preparing food and eating? No   Do you have difficulty bathing yourself, getting dressed or grooming yourself? No   Do you have difficulty using the toilet? No   Do you have difficulty moving around from place to place? Yes   Do you have trouble with steps or getting out of a bed or a chair? Yes   In the past year have you fallen or experienced a near fall? No   Current Diet Well Balanced Diet   Dental Exam Up to date   Eye Exam Up to date   Exercise (times per week) 2 times per week   Current Exercise Activities Include Walking   Do you need help using the phone?  No   Are you deaf or do you have serious difficulty hearing?  Yes   Do you need help with transportation? Yes   Do you need help shopping? No   Do you need help preparing meals?  No   Do you need help with housework?  No   Do you need help with laundry? No   Do you need help taking your medications? No   Do you need help managing money? No   Do you ever drive or ride in a car without wearing a seat belt? No   Have you felt unusual stress,  anger or loneliness in the last month? No   Who do you live with? Alone   If you need help, do you have trouble finding someone available to you? No   Have you been bothered in the last four weeks by sexual problems? No   Do you have difficulty concentrating, remembering or making decisions? No           Does the patient have evidence of cognitive impairment? No    Aspirin use counseling: Does not need ASA (and currently is not on it)      Recent Lab Results:  CMP:  Lab Results   Component Value Date    GLU 73 09/21/2018    BUN 18 09/21/2018    CREATININE 1.06 09/21/2018    EGFRIFNONA 52 (L) 09/21/2018    EGFRIFAFRI 63 09/21/2018    BCR 17.0 09/21/2018     09/21/2018    K 4.1 09/21/2018    CO2 31.0 09/21/2018    CALCIUM 8.4 (L) 09/21/2018    PROTENTOTREF 6.2 09/21/2018    ALBUMIN 3.77 09/21/2018    LABGLOBREF 2.4 09/21/2018    LABIL2 1.6 09/21/2018    BILITOT 0.4 09/21/2018    ALKPHOS 42 09/21/2018    AST 21 09/21/2018    ALT 20 09/21/2018     Lipid Panel:  Lab Results   Component Value Date    CHOL 169 01/30/2018    TRIG 101 09/21/2018    HDL 37 (L) 09/21/2018    VLDL 20.2 09/21/2018     HbA1c:  Lab Results   Component Value Date    HGBA1C 5.6 02/01/2019       Visual Acuity:  No exam data present    Age-appropriate Screening Schedule:  Refer to the list below for future screening recommendations based on patient's age, sex and/or medical conditions. Orders for these recommended tests are listed in the plan section. The patient has been provided with a written plan.    Health Maintenance   Topic Date Due   • DIABETIC EYE EXAM  01/01/2019   • DIABETIC FOOT EXAM  07/26/2019   • HEMOGLOBIN A1C  08/01/2019   • LIPID PANEL  09/21/2019   • URINE MICROALBUMIN  02/01/2020   • PAP SMEAR  07/11/2020   • MAMMOGRAM  12/20/2020   • PNEUMOCOCCAL VACCINES (65+ LOW/MEDIUM RISK) (2 of 2 - PPSV23) 01/01/2021   • TDAP/TD VACCINES (2 - Td) 01/01/2022   • COLONOSCOPY  04/01/2026   • INFLUENZA VACCINE  Completed   • ZOSTER VACCINE   Discontinued        Subjective   History of Present Illness    Livier Gutierrez is a 63 y.o. female who presents for an Subsequent Wellness Visit.    DM II - does not ck FS @ home; currently on 500mg of Metformin daily; A1C was 5.6%       Has seen podiatry in the past for BLE N/T.  An EMG of her LE's was ordered for BLE pain, N/T.  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  She was started on Gabapentin.  Cannot tolerate a higher dose than 2 PO QHS.  Does feel that it has helped with her sx's and she denies any med SE's.  Had a recent wound on her right 2nd toe.  Has been followed closely and it is healing well.      HTN - chronic and stable on Cozaar, Atenolol and HCTZ.  She has not had her meds today so BP is elevated in office.  Generally runs much lower at home.        HL - has been off of her pravastatin for a while; made her feel terrible - she is not fasting but is due for labs.      H/O thyroid CA - managed by oncologist at  currently has her on 150mcg; Let her go to the [facility]. She is now seeing her every 6 months.        GERD is chronic and stable on Omeprazole, but only takes it PRN      Allergies are doing ok. Taking Claritin everyday, but likes to have an albuterol inhaler on hand for wheezing/SOA.       Bilateral knee pain - needs bilateral knee replacement, but is trying to hold out. Takes Diclofenac PRN for pain.  Has not seen Ortho in several years.  I am concerned as she can barely walk through the office today.  Using a cane.  She has seen Ortho, Dr. Quinones and had surgery planned but cancelled.  She requested a 2nd opinion and referral to Dr. Lauren at last visit.  She has seen him and will have surgery in March.           The following portions of the patient's history were reviewed and updated as appropriate: allergies, current medications, past family history, past medical history, past social history, past surgical history and problem list.    Outpatient Medications  Prior to Visit   Medication Sig Dispense Refill   • albuterol (PROVENTIL HFA) 108 (90 Base) MCG/ACT inhaler Inhale 2 puffs Every 4 (Four) Hours As Needed for Wheezing. 1 inhaler 2   • atenolol (TENORMIN) 25 MG tablet Take 1 tablet by mouth Daily. 90 tablet 1   • diclofenac (VOLTAREN) 75 MG EC tablet TAKE ONE TABLET BY MOUTH TWICE A DAY AS NEEDED FOR PAIN 60 tablet 5   • glucose blood test strip 1 po bid as directed- One touch ultra Blue 100 100 each 12   • glucose monitor monitoring kit 1 each Daily. 1 each 0   • Lancets misc 1 each Daily. 60 each 11   • levothyroxine (SYNTHROID, LEVOTHROID) 150 MCG tablet Take 150 mcg by mouth daily.     • omeprazole (priLOSEC) 20 MG capsule TAKE ONE CAPSULE BY MOUTH TWICE A DAY WITH DICLOFENAC 180 capsule 2   • aspirin 81 MG tablet Take 1 tablet by mouth Daily. 90 tablet 3   • budesonide-formoterol (SYMBICORT) 80-4.5 MCG/ACT inhaler Inhale 2 puffs 2 (Two) Times a Day. 1 inhaler 5   • losartan (COZAAR) 25 MG tablet Take 1 tablet by mouth Daily. 90 tablet 1   • metFORMIN (GLUCOPHAGE) 500 MG tablet Take 1 tablet by mouth Daily. 90 tablet 1   • triamcinolone (KENALOG) 0.1 % cream Apply  topically 2 (Two) Times a Day. 45 g 2   • triamterene-hydrochlorothiazide (MAXZIDE-25) 37.5-25 MG per tablet TAKE ONE TABLET BY MOUTH DAILY 90 tablet 1     No facility-administered medications prior to visit.        Patient Active Problem List   Diagnosis   • History of thyroid cancer   • Hyperlipidemia   • Hypertension   • Diabetes mellitus (CMS/HCC)   • Allergic   • Joint pain   • Malignant neoplasm of thyroid gland (CMS/HCC)   • Allergic rhinitis   • Regional enteritis (CMS/HCC)   • Cellulitis   • Exertional dyspnea   • Skin lesion   • Primary osteoarthritis of both knees       Advance Care Planning:  has NO advance directive - not interested in additional information    Identification of Risk Factors:  Risk factors include: weight , unhealthy diet, cardiovascular risk and inactivity.    Review of  Systems   Constitutional: Negative for appetite change, chills, fatigue, fever and unexpected weight change.   HENT: Negative for congestion, ear pain, nosebleeds, rhinorrhea and tinnitus.    Eyes: Negative for pain.   Respiratory: Negative for cough, chest tightness and shortness of breath.    Cardiovascular: Negative for chest pain, palpitations and leg swelling.   Gastrointestinal: Negative for abdominal distention, abdominal pain, blood in stool, constipation, diarrhea, nausea and vomiting.   Endocrine: Negative for cold intolerance, heat intolerance, polydipsia, polyphagia and polyuria.   Genitourinary: Negative for dysuria, flank pain, frequency, hematuria and urgency.   Musculoskeletal: Negative for arthralgias, back pain, gait problem, joint swelling, myalgias and neck pain.   Skin: Negative for color change, pallor, rash and wound.   Allergic/Immunologic: Negative for environmental allergies and food allergies.   Neurological: Negative for dizziness, syncope, weakness, light-headedness, numbness and headaches.   Hematological: Negative for adenopathy. Does not bruise/bleed easily.   Psychiatric/Behavioral: Negative for behavioral problems and suicidal ideas. The patient is not nervous/anxious.        Compared to one year ago, the patient feels her physical health is worse.  Compared to one year ago, the patient feels her mental health is the same.    Objective     Physical Exam   Constitutional: She is oriented to person, place, and time. She appears well-developed and well-nourished.   HENT:   Head: Normocephalic and atraumatic.   Right Ear: External ear normal.   Left Ear: External ear normal.   Nose: Nose normal.   Mouth/Throat: Oropharynx is clear and moist.   Eyes: Conjunctivae and EOM are normal. Pupils are equal, round, and reactive to light.   Neck: Normal range of motion. Neck supple.   Cardiovascular: Normal rate, regular rhythm, normal heart sounds and intact distal pulses.   Pulses:        "Carotid pulses are 2+ on the right side, and 2+ on the left side.  Pulmonary/Chest: Effort normal and breath sounds normal.   Abdominal: Soft. Normal appearance, normal aorta and bowel sounds are normal.   Musculoskeletal: Normal range of motion.   Lymphadenopathy:        Head (right side): No tonsillar adenopathy present.        Head (left side): No tonsillar adenopathy present.        Right cervical: No superficial cervical and no posterior cervical adenopathy present.       Left cervical: No superficial cervical and no posterior cervical adenopathy present.   Neurological: She is alert and oriented to person, place, and time. She has normal strength and normal reflexes. She displays a negative Romberg sign.   Skin: Skin is warm, dry and intact.   Psychiatric: She has a normal mood and affect. Her behavior is normal. Judgment and thought content normal.   Vitals reviewed.      Vitals:    02/01/19 0913   BP: 132/88   Pulse: 68   Resp: 18   Temp: 98 °F (36.7 °C)   TempSrc: Temporal   SpO2: 97%   Weight: 92.1 kg (203 lb)   Height: 168.9 cm (66.5\")   PainSc: 0-No pain  Comment: stiffness in the knees   PainLoc: Knee       Patient's Body mass index is 32.28 kg/m². BMI is above normal parameters. Recommendations include: educational material, exercise counseling and nutrition counseling.      Assessment/Plan   Patient Self-Management and Personalized Health Advice  The patient has been provided with information about: diet, exercise and weight management and preventive services including:   · Advance directive, Bone densitometry screening, Exercise counseling provided, Influenza vaccine, Nutrition counseling provided, Pneumococcal vaccine .    Flu shot 10/17 - updating today  Pneumovax 10/13   Zostavax, declines  Prevnar - 4/19/16  Tdap 2012   DEXA - 10/14 (normal) - ordered today  Ovarian Ca screening; [facility]   Mamm 12/20/2018  Pap - 7/2014; no longer doing ([facility])  Colon 2016 ([facility]); repeating in 1 year this time, he " found some mild crohns - she called is office and since she is sx free, he is going to hold off on a colon for now.    Eye exam - 1/2018; scheduled for 2/19/19  Foot exam 1/2019 per podiatry         Visit Diagnoses:    ICD-10-CM ICD-9-CM   1. Medicare annual wellness visit, subsequent Z00.00 V70.0   2. Routine general medical examination at a health care facility Z00.00 V70.0   3. Type 2 diabetes mellitus without complication, without long-term current use of insulin (CMS/[facility]) E11.9 250.00   4. Essential hypertension I10 401.9   5. Hyperlipidemia, unspecified hyperlipidemia type E78.5 272.4   6. Malignant neoplasm of thyroid gland (CMS/[facility]) C73 193   7. Primary osteoarthritis of both knees M17.0 715.16   8. Crohn's disease with complication, unspecified gastrointestinal tract location (CMS/[facility]) K50.919 555.9   9. Vitamin D deficiency E55.9 268.9   10. Need for pneumococcal vaccination Z23 V03.82   11. Flu vaccine need Z23 V04.81   12. Post-menopausal Z78.0 V49.81       Orders Placed This Encounter   Procedures   • DEXA Bone Density Axial     Order Specific Question:   Reason for Exam:     Answer:   post menopausal   • Pneumococcal Polysaccharide Vaccine 23-Valent Greater Than or Equal To 1yo Subcutaneous / IM   • Flu Vaccine High Dose PF 65YR+ ([id number])   • MicroAlbumin, Urine, Random -   • Comprehensive Metabolic Panel   • Lipid Panel   • Vitamin D 25 Hydroxy   • Vitamin B12   • TSH   • POC Glycosylated Hemoglobin (Hb A1C)   • CBC & Differential     Order Specific Question:   Manual Differential     Answer:   No       Outpatient Encounter Medications as of 2/1/2019   Medication Sig Dispense Refill   • albuterol (PROVENTIL HFA) 108 (90 Base) MCG/ACT inhaler Inhale 2 puffs Every 4 (Four) Hours As Needed for Wheezing. 1 inhaler 2   • atenolol (TENORMIN) 25 MG tablet Take 1 tablet by mouth Daily. 90 tablet 1   • diclofenac (VOLTAREN) 75 MG EC tablet TAKE ONE TABLET BY MOUTH TWICE A DAY AS NEEDED FOR PAIN 60 tablet 5    • glucose blood test strip 1 po bid as directed- One touch ultra Blue 100 100 each 12   • glucose monitor monitoring kit 1 each Daily. 1 each 0   • Lancets misc 1 each Daily. 60 each 11   • levothyroxine (SYNTHROID, LEVOTHROID) 150 MCG tablet Take 150 mcg by mouth daily.     • omeprazole (priLOSEC) 20 MG capsule TAKE ONE CAPSULE BY MOUTH TWICE A DAY WITH DICLOFENAC 180 capsule 2   • aspirin 81 MG tablet Take 1 tablet by mouth Daily. 90 tablet 3   • budesonide-formoterol (SYMBICORT) 80-4.5 MCG/ACT inhaler Inhale 2 puffs 2 (Two) Times a Day. 1 inhaler 5   • losartan (COZAAR) 25 MG tablet Take 1 tablet by mouth Daily. 90 tablet 1   • metFORMIN (GLUCOPHAGE) 500 MG tablet Take 1 tablet by mouth Daily. 90 tablet 1   • triamcinolone (KENALOG) 0.1 % cream Apply  topically 2 (Two) Times a Day. 45 g 2   • triamterene-hydrochlorothiazide (MAXZIDE-25) 37.5-25 MG per tablet TAKE ONE TABLET BY MOUTH DAILY 90 tablet 1     No facility-administered encounter medications on file as of 2/1/2019.        Reviewed use of high risk medication in the elderly: yes  Reviewed for potential of harmful drug interactions in the elderly: yes    Follow Up:  Return in about 6 months (around 8/1/2019).     An After Visit Summary and PPPS with all of these plans were given to the patient.

## 2019-02-02 LAB
25(OH)D3+25(OH)D2 SERPL-MCNC: 46.4 NG/ML (ref 30–100)
ALBUMIN SERPL-MCNC: 3.8 G/DL (ref 3.6–4.8)
ALBUMIN/CREAT UR: 14.3 MG/G CREAT (ref 0–30)
ALBUMIN/GLOB SERPL: 1.3 {RATIO} (ref 1.2–2.2)
ALP SERPL-CCNC: 48 IU/L (ref 39–117)
ALT SERPL-CCNC: 13 IU/L (ref 0–32)
AST SERPL-CCNC: 13 IU/L (ref 0–40)
BASOPHILS # BLD AUTO: 0 X10E3/UL (ref 0–0.2)
BASOPHILS NFR BLD AUTO: 0 %
BILIRUB SERPL-MCNC: 0.3 MG/DL (ref 0–1.2)
BUN SERPL-MCNC: 16 MG/DL (ref 8–27)
BUN/CREAT SERPL: 18 (ref 12–28)
CALCIUM SERPL-MCNC: 8.4 MG/DL (ref 8.7–10.3)
CHLORIDE SERPL-SCNC: 104 MMOL/L (ref 96–106)
CHOLEST SERPL-MCNC: 170 MG/DL (ref 100–199)
CO2 SERPL-SCNC: 26 MMOL/L (ref 20–29)
CREAT SERPL-MCNC: 0.89 MG/DL (ref 0.57–1)
CREAT UR-MCNC: 58 MG/DL
EOSINOPHIL # BLD AUTO: 0.2 X10E3/UL (ref 0–0.4)
EOSINOPHIL NFR BLD AUTO: 3 %
ERYTHROCYTE [DISTWIDTH] IN BLOOD BY AUTOMATED COUNT: 18.5 % (ref 12.3–15.4)
GLOBULIN SER CALC-MCNC: 2.9 G/DL (ref 1.5–4.5)
GLUCOSE SERPL-MCNC: 80 MG/DL (ref 65–99)
HCT VFR BLD AUTO: 36.2 % (ref 34–46.6)
HDLC SERPL-MCNC: 41 MG/DL
HGB BLD-MCNC: 11.3 G/DL (ref 11.1–15.9)
IMM GRANULOCYTES # BLD AUTO: 0 X10E3/UL (ref 0–0.1)
IMM GRANULOCYTES NFR BLD AUTO: 0 %
LDLC SERPL CALC-MCNC: 110 MG/DL (ref 0–99)
LYMPHOCYTES # BLD AUTO: 1.8 X10E3/UL (ref 0.7–3.1)
LYMPHOCYTES NFR BLD AUTO: 24 %
MCH RBC QN AUTO: 23.6 PG (ref 26.6–33)
MCHC RBC AUTO-ENTMCNC: 31.2 G/DL (ref 31.5–35.7)
MCV RBC AUTO: 76 FL (ref 79–97)
MICROALBUMIN UR-MCNC: 8.3 UG/ML
MONOCYTES # BLD AUTO: 0.9 X10E3/UL (ref 0.1–0.9)
MONOCYTES NFR BLD AUTO: 12 %
NEUTROPHILS # BLD AUTO: 4.8 X10E3/UL (ref 1.4–7)
NEUTROPHILS NFR BLD AUTO: 61 %
PLATELET # BLD AUTO: 386 X10E3/UL (ref 150–379)
POTASSIUM SERPL-SCNC: 4.4 MMOL/L (ref 3.5–5.2)
PROT SERPL-MCNC: 6.7 G/DL (ref 6–8.5)
RBC # BLD AUTO: 4.78 X10E6/UL (ref 3.77–5.28)
SODIUM SERPL-SCNC: 144 MMOL/L (ref 134–144)
TRIGL SERPL-MCNC: 95 MG/DL (ref 0–149)
TSH SERPL DL<=0.005 MIU/L-ACNC: 0.09 UIU/ML (ref 0.45–4.5)
VIT B12 SERPL-MCNC: >2000 PG/ML (ref 232–1245)
VLDLC SERPL CALC-MCNC: 19 MG/DL (ref 5–40)
WBC # BLD AUTO: 7.8 X10E3/UL (ref 3.4–10.8)

## 2019-02-10 RX ORDER — LOVASTATIN 20 MG/1
20 TABLET ORAL NIGHTLY
Qty: 90 TABLET | Refills: 1 | Status: SHIPPED | OUTPATIENT
Start: 2019-02-10 | End: 2019-03-07

## 2019-02-11 ENCOUNTER — TELEPHONE (OUTPATIENT)
Dept: INTERNAL MEDICINE | Facility: CLINIC | Age: 64
End: 2019-02-11

## 2019-02-11 NOTE — TELEPHONE ENCOUNTER
----- Message from GUSTAVO Marcelino sent at 2/10/2019 12:00 PM EST -----  Cholesterol is too high. I know she did not tolerate the pravastatin, so I have sent in Lovastatin 20 mg in hopes that she will tolerate this better.      All other labs are stable.

## 2019-02-13 DIAGNOSIS — M54.12 CERVICAL RADICULOPATHY: ICD-10-CM

## 2019-02-13 DIAGNOSIS — M25.511 RIGHT SHOULDER PAIN, UNSPECIFIED CHRONICITY: ICD-10-CM

## 2019-02-13 RX ORDER — DICLOFENAC SODIUM 75 MG/1
TABLET, DELAYED RELEASE ORAL
Qty: 60 TABLET | Refills: 4 | Status: SHIPPED | OUTPATIENT
Start: 2019-02-13 | End: 2019-08-01 | Stop reason: SDUPTHER

## 2019-02-28 ENCOUNTER — OFFICE VISIT (OUTPATIENT)
Dept: INTERNAL MEDICINE | Facility: CLINIC | Age: 64
End: 2019-02-28

## 2019-02-28 VITALS
TEMPERATURE: 97.9 F | OXYGEN SATURATION: 98 % | WEIGHT: 206.8 LBS | HEART RATE: 67 BPM | SYSTOLIC BLOOD PRESSURE: 144 MMHG | BODY MASS INDEX: 33.23 KG/M2 | DIASTOLIC BLOOD PRESSURE: 88 MMHG | HEIGHT: 66 IN | RESPIRATION RATE: 16 BRPM

## 2019-02-28 DIAGNOSIS — Z01.818 PREOP EXAMINATION: ICD-10-CM

## 2019-02-28 DIAGNOSIS — Z79.899 ENCOUNTER FOR LONG-TERM (CURRENT) USE OF MEDICATIONS: ICD-10-CM

## 2019-02-28 DIAGNOSIS — E11.65 TYPE 2 DIABETES MELLITUS WITH HYPERGLYCEMIA, WITHOUT LONG-TERM CURRENT USE OF INSULIN (HCC): ICD-10-CM

## 2019-02-28 DIAGNOSIS — E07.89 OTHER SPECIFIED DISORDERS OF THYROID: ICD-10-CM

## 2019-02-28 DIAGNOSIS — M25.562 ARTHRALGIA OF BOTH KNEES: Primary | ICD-10-CM

## 2019-02-28 DIAGNOSIS — R94.31 ABNORMAL EKG: ICD-10-CM

## 2019-02-28 DIAGNOSIS — E11.9 TYPE 2 DIABETES MELLITUS WITHOUT COMPLICATION, WITHOUT LONG-TERM CURRENT USE OF INSULIN (HCC): ICD-10-CM

## 2019-02-28 DIAGNOSIS — M25.561 ARTHRALGIA OF BOTH KNEES: Primary | ICD-10-CM

## 2019-02-28 DIAGNOSIS — M17.0 PRIMARY OSTEOARTHRITIS OF BOTH KNEES: ICD-10-CM

## 2019-02-28 DIAGNOSIS — R31.9 HEMATURIA, UNSPECIFIED TYPE: ICD-10-CM

## 2019-02-28 DIAGNOSIS — Z85.850 HISTORY OF THYROID CANCER: ICD-10-CM

## 2019-02-28 LAB
BILIRUB BLD-MCNC: NEGATIVE MG/DL
CLARITY, POC: CLEAR
COLOR UR: YELLOW
GLUCOSE UR STRIP-MCNC: NEGATIVE MG/DL
KETONES UR QL: NEGATIVE
LEUKOCYTE EST, POC: NEGATIVE
NITRITE UR-MCNC: NEGATIVE MG/ML
PH UR: 6 [PH] (ref 5–8)
PROT UR STRIP-MCNC: NEGATIVE MG/DL
RBC # UR STRIP: ABNORMAL /UL
SP GR UR: 1.01 (ref 1–1.03)
UROBILINOGEN UR QL: NORMAL

## 2019-02-28 PROCEDURE — 93000 ELECTROCARDIOGRAM COMPLETE: CPT | Performed by: NURSE PRACTITIONER

## 2019-02-28 PROCEDURE — 81003 URINALYSIS AUTO W/O SCOPE: CPT | Performed by: NURSE PRACTITIONER

## 2019-02-28 PROCEDURE — 99214 OFFICE O/P EST MOD 30 MIN: CPT | Performed by: NURSE PRACTITIONER

## 2019-02-28 NOTE — PROGRESS NOTES
Subjective   Livier Gutierrez is a 63 y.o. female    Chief Complaint   Patient presents with   • Pre-op Exam     Left knee replacement on 3/12/19 and right knee in April. Dr. Gregory Lauren at [facility]     History of Present Illness     Pre-Operative Consultation     Date of Surgery: March 12, 2019  Surgeon: Dr. Lauren  Surgical Complaints: Left knee pain  Significant Complaints: none other than knee pain  Current Meds:   Current Outpatient Medications on File Prior to Visit   Medication Sig Dispense Refill   • albuterol (PROVENTIL HFA) 108 (90 Base) MCG/ACT inhaler Inhale 2 puffs Every 4 (Four) Hours As Needed for Wheezing. 1 inhaler 2   • aspirin 81 MG tablet Take 1 tablet by mouth Daily. 90 tablet 3   • atenolol (TENORMIN) 25 MG tablet Take 1 tablet by mouth Daily. 90 tablet 1   • budesonide-formoterol (SYMBICORT) 80-4.5 MCG/ACT inhaler Inhale 2 puffs 2 (Two) Times a Day. 1 inhaler 5   • diclofenac (VOLTAREN) 75 MG EC tablet TAKE ONE TABLET BY MOUTH TWICE A DAY AS NEEDED FOR PAIN 60 tablet 4   • glucose blood test strip 1 po bid as directed- One touch ultra Blue 100 100 each 12   • glucose monitor monitoring kit 1 each Daily. 1 each 0   • Lancets misc 1 each Daily. 60 each 11   • levothyroxine (SYNTHROID, LEVOTHROID) 150 MCG tablet Take 150 mcg by mouth daily.     • losartan (COZAAR) 25 MG tablet Take 1 tablet by mouth Daily. 90 tablet 1   • lovastatin (MEVACOR) 20 MG tablet Take 1 tablet by mouth Every Night. 90 tablet 1   • metFORMIN (GLUCOPHAGE) 500 MG tablet Take 1 tablet by mouth Daily. 90 tablet 1   • omeprazole (priLOSEC) 20 MG capsule TAKE ONE CAPSULE BY MOUTH TWICE A DAY WITH DICLOFENAC 180 capsule 2   • triamcinolone (KENALOG) 0.1 % cream Apply  topically 2 (Two) Times a Day. 45 g 2   • triamterene-hydrochlorothiazide (MAXZIDE-25) 37.5-25 MG per tablet TAKE ONE TABLET BY MOUTH DAILY 90 tablet 1     No current facility-administered medications on file prior to visit.        Drug Allergies:    Allergies   Allergen Reactions   • Latex Angioedema   • Penicillins        Steroid Therapy within the past 6 months: None  Anti-platelet Agents within the past 6 months: None    Significant Past History Difficulty with Anesthesia: None    1. Previous surgeries:   Past Surgical History:   Procedure Laterality Date   • COLONOSCOPY  04/2016   • OOPHORECTOMY Bilateral 1995   • THYROIDECTOMY  2005   • TOTAL ABDOMINAL HYSTERECTOMY  1995       2. Serious illnesses:   Past Medical History:   Diagnosis Date   • Allergic    • Diabetes mellitus (CMS/HCC)    • H/O mammogram 12/19/2017   • History of thyroid cancer    • Hyperlipidemia    • Hypertension    • Joint pain    • Pap smear for cervical cancer screening 07/14/2014        3. Social history:   Social History     Tobacco Use   • Smoking status: Former Smoker   • Smokeless tobacco: Never Used   • Tobacco comment: quit about 32 years ago   Substance Use Topics   • Alcohol use: No   • Drug use: No       4. Significant family history:  Family History   Problem Relation Age of Onset   • Crohn's disease Mother    • Breast cancer Mother 85   • Diabetes Father    • Heart failure Father         congestive   • Crohn's disease Sister    • Thyroid cancer Sister    • Thyroid cancer Sister    • Diabetes Sister    • Ovarian cancer Neg Hx        Family history of anesthesia problems: None    Positive findings on system review: Bilateral knee pain    The following portions of the patient's history were reviewed and updated as appropriate: allergies, current medications, past family history, past medical history, past social history, past surgical history and problem list.    Current Outpatient Medications:   •  albuterol (PROVENTIL HFA) 108 (90 Base) MCG/ACT inhaler, Inhale 2 puffs Every 4 (Four) Hours As Needed for Wheezing., Disp: 1 inhaler, Rfl: 2  •  aspirin 81 MG tablet, Take 1 tablet by mouth Daily., Disp: 90 tablet, Rfl: 3  •  atenolol (TENORMIN) 25 MG tablet, Take 1 tablet by  mouth Daily., Disp: 90 tablet, Rfl: 1  •  budesonide-formoterol (SYMBICORT) 80-4.5 MCG/ACT inhaler, Inhale 2 puffs 2 (Two) Times a Day., Disp: 1 inhaler, Rfl: 5  •  diclofenac (VOLTAREN) 75 MG EC tablet, TAKE ONE TABLET BY MOUTH TWICE A DAY AS NEEDED FOR PAIN, Disp: 60 tablet, Rfl: 4  •  glucose blood test strip, 1 po bid as directed- One touch ultra Blue 100, Disp: 100 each, Rfl: 12  •  glucose monitor monitoring kit, 1 each Daily., Disp: 1 each, Rfl: 0  •  Lancets misc, 1 each Daily., Disp: 60 each, Rfl: 11  •  levothyroxine (SYNTHROID, LEVOTHROID) 150 MCG tablet, Take 150 mcg by mouth daily., Disp: , Rfl:   •  losartan (COZAAR) 25 MG tablet, Take 1 tablet by mouth Daily., Disp: 90 tablet, Rfl: 1  •  lovastatin (MEVACOR) 20 MG tablet, Take 1 tablet by mouth Every Night., Disp: 90 tablet, Rfl: 1  •  metFORMIN (GLUCOPHAGE) 500 MG tablet, Take 1 tablet by mouth Daily., Disp: 90 tablet, Rfl: 1  •  omeprazole (priLOSEC) 20 MG capsule, TAKE ONE CAPSULE BY MOUTH TWICE A DAY WITH DICLOFENAC, Disp: 180 capsule, Rfl: 2  •  triamcinolone (KENALOG) 0.1 % cream, Apply  topically 2 (Two) Times a Day., Disp: 45 g, Rfl: 2  •  triamterene-hydrochlorothiazide (MAXZIDE-25) 37.5-25 MG per tablet, TAKE ONE TABLET BY MOUTH DAILY, Disp: 90 tablet, Rfl: 1     Review of Systems   Constitutional: Negative for appetite change, chills, fatigue, fever and unexpected weight change.   HENT: Negative for congestion, ear pain, nosebleeds, rhinorrhea and tinnitus.    Eyes: Negative for pain.   Respiratory: Negative for cough, chest tightness and shortness of breath.    Cardiovascular: Negative for chest pain, palpitations and leg swelling.   Gastrointestinal: Negative for abdominal distention, abdominal pain, blood in stool, constipation, diarrhea, nausea and vomiting.   Endocrine: Negative for cold intolerance, heat intolerance, polydipsia, polyphagia and polyuria.   Genitourinary: Negative for dysuria, flank pain, frequency, hematuria and  urgency.   Musculoskeletal: Positive for arthralgias, gait problem and joint swelling. Negative for back pain, myalgias and neck pain.        Bilateral knee pain   Skin: Negative for color change, pallor, rash and wound.   Allergic/Immunologic: Negative for environmental allergies and food allergies.   Neurological: Negative for dizziness, syncope, weakness, light-headedness, numbness and headaches.   Hematological: Negative for adenopathy. Does not bruise/bleed easily.   Psychiatric/Behavioral: Negative for behavioral problems and suicidal ideas. The patient is not nervous/anxious.        Objective     Physical Exam   Constitutional: She is oriented to person, place, and time. She appears well-developed and well-nourished.   HENT:   Head: Normocephalic and atraumatic.   Right Ear: External ear normal.   Left Ear: External ear normal.   Nose: Nose normal.   Mouth/Throat: Oropharynx is clear and moist.   Eyes: Conjunctivae and EOM are normal. Pupils are equal, round, and reactive to light.   Neck: Normal range of motion. Neck supple.   Cardiovascular: Normal rate, regular rhythm, normal heart sounds and intact distal pulses.   Pulses:       Carotid pulses are 2+ on the right side, and 2+ on the left side.  Pulmonary/Chest: Effort normal and breath sounds normal.   Abdominal: Soft. Normal appearance, normal aorta and bowel sounds are normal.   Musculoskeletal:        Right knee: She exhibits decreased range of motion, swelling and bony tenderness. She exhibits no effusion, no ecchymosis, no deformity, no laceration, no erythema, normal alignment, no LCL laxity and normal patellar mobility. Tenderness found.        Left knee: She exhibits decreased range of motion, swelling and bony tenderness. She exhibits no effusion, no ecchymosis, no deformity, no laceration, no erythema, normal alignment, no LCL laxity and normal patellar mobility. Tenderness found.   Lymphadenopathy:        Head (right side): No tonsillar  "adenopathy present.        Head (left side): No tonsillar adenopathy present.        Right cervical: No superficial cervical and no posterior cervical adenopathy present.       Left cervical: No superficial cervical and no posterior cervical adenopathy present.   Neurological: She is alert and oriented to person, place, and time. She has normal strength and normal reflexes. She displays a negative Romberg sign.   Skin: Skin is warm, dry and intact.   Psychiatric: She has a normal mood and affect. Her behavior is normal. Judgment and thought content normal.   Vitals reviewed.    Vitals:    02/28/19 1254   BP: 144/88   Pulse: 67   Resp: 16   Temp: 97.9 °F (36.6 °C)   TempSrc: Temporal   SpO2: 98%   Weight: 93.8 kg (206 lb 12.8 oz)   Height: 168.9 cm (66.5\")       ECG 12 Lead  Date/Time: 2/28/2019 1:44 PM  Performed by: Tracey Smith APRN  Authorized by: Tracey Smith APRN   Comparison: compared with previous ECG from 2/11/2016  Rhythm: sinus rhythm  Rate: normal  Conduction: left bundle branch block  T inversion: V3 and V4  QRS axis: normal  Other findings: non-specific ST-T wave changes    Clinical impression: abnormal EKG            Assessment/Plan   Livier was seen today for pre-op exam.    Diagnoses and all orders for this visit:    Arthralgia of both knees  -     XR Chest PA & Lateral  -     ECG 12 Lead  -     Hemoglobin A1c  -     CBC & Differential  -     Protime-INR  -     APTT  -     Basic Metabolic Panel  -     POC Urinalysis Dipstick, Automated  -     Ambulatory Referral to Cardiology    Preop examination  -     XR Chest PA & Lateral  -     ECG 12 Lead  -     Hemoglobin A1c  -     CBC & Differential  -     Protime-INR  -     APTT  -     Basic Metabolic Panel  -     POC Urinalysis Dipstick, Automated  -     Ambulatory Referral to Cardiology    Type 2 diabetes mellitus without complication, without long-term current use of insulin (CMS/[facility])  -     XR Chest PA & Lateral  -     ECG 12 Lead  -     " Hemoglobin A1c  -     CBC & Differential  -     Protime-INR  -     APTT  -     Basic Metabolic Panel  -     POC Urinalysis Dipstick, Automated  -     Ambulatory Referral to Cardiology    History of thyroid cancer  -     XR Chest PA & Lateral  -     ECG 12 Lead  -     Hemoglobin A1c  -     CBC & Differential  -     Protime-INR  -     APTT  -     Basic Metabolic Panel  -     POC Urinalysis Dipstick, Automated    Primary osteoarthritis of both knees  -     XR Chest PA & Lateral  -     ECG 12 Lead  -     Hemoglobin A1c  -     CBC & Differential  -     Protime-INR  -     APTT  -     Basic Metabolic Panel  -     POC Urinalysis Dipstick, Automated    Encounter for long-term (current) use of medications  -     XR Chest PA & Lateral  -     ECG 12 Lead  -     Hemoglobin A1c  -     CBC & Differential  -     Protime-INR  -     APTT  -     Basic Metabolic Panel  -     POC Urinalysis Dipstick, Automated    Other specified disorders of thyroid   -     Protime-INR    Type 2 diabetes mellitus with hyperglycemia, without long-term current use of insulin (CMS/[facility])   -     APTT    Abnormal EKG  -     Ambulatory Referral to Cardiology    Hematuria, unspecified type  -     Urine Culture - Urine, Urine, Clean Catch      EKG - abnormal, referred to Cards for Cardiac clearance  Labs sent  Sent for CXR  We will have to await cardiac clearance.

## 2019-03-01 LAB
APTT PPP: 33.1 SECONDS (ref 24–37)
BASOPHILS # BLD AUTO: 0.06 10*3/MM3 (ref 0–0.2)
BASOPHILS NFR BLD AUTO: 0.8 % (ref 0–1)
BUN SERPL-MCNC: 19 MG/DL (ref 9–23)
BUN/CREAT SERPL: 20 (ref 7–25)
CALCIUM SERPL-MCNC: 8.4 MG/DL (ref 8.7–10.4)
CHLORIDE SERPL-SCNC: 106 MMOL/L (ref 99–109)
CO2 SERPL-SCNC: 28 MMOL/L (ref 20–31)
CREAT SERPL-MCNC: 0.95 MG/DL (ref 0.6–1.3)
EOSINOPHIL # BLD AUTO: 0.27 10*3/MM3 (ref 0–0.3)
EOSINOPHIL NFR BLD AUTO: 3.6 % (ref 0–3)
ERYTHROCYTE [DISTWIDTH] IN BLOOD BY AUTOMATED COUNT: 16.9 % (ref 11.3–14.5)
GLUCOSE SERPL-MCNC: 62 MG/DL (ref 70–100)
HBA1C MFR BLD: 5.6 % (ref 4.8–5.6)
HCT VFR BLD AUTO: 37.3 % (ref 34.5–44)
HGB BLD-MCNC: 11.6 G/DL (ref 11.5–15.5)
IMM GRANULOCYTES # BLD AUTO: 0.01 10*3/MM3 (ref 0–0.05)
IMM GRANULOCYTES NFR BLD AUTO: 0.1 % (ref 0–0.6)
INR PPP: 1.09 (ref 0.85–1.16)
LYMPHOCYTES # BLD AUTO: 2.29 10*3/MM3 (ref 0.6–4.8)
LYMPHOCYTES NFR BLD AUTO: 30.7 % (ref 24–44)
MCH RBC QN AUTO: 23.4 PG (ref 27–31)
MCHC RBC AUTO-ENTMCNC: 31.1 G/DL (ref 32–36)
MCV RBC AUTO: 75.4 FL (ref 80–99)
MONOCYTES # BLD AUTO: 0.67 10*3/MM3 (ref 0–1)
MONOCYTES NFR BLD AUTO: 9 % (ref 0–12)
NEUTROPHILS # BLD AUTO: 4.18 10*3/MM3 (ref 1.5–8.3)
NEUTROPHILS NFR BLD AUTO: 55.9 % (ref 41–71)
PLATELET # BLD AUTO: 340 10*3/MM3 (ref 150–450)
POTASSIUM SERPL-SCNC: 3.8 MMOL/L (ref 3.5–5.5)
PROTHROMBIN TIME: 13.6 SECONDS (ref 11.2–14.3)
RBC # BLD AUTO: 4.95 10*6/MM3 (ref 3.89–5.14)
SODIUM SERPL-SCNC: 141 MMOL/L (ref 132–146)
WBC # BLD AUTO: 7.47 10*3/MM3 (ref 3.5–10.8)

## 2019-03-03 LAB
BACTERIA UR CULT: NORMAL
BACTERIA UR CULT: NORMAL

## 2019-03-05 ENCOUNTER — TELEPHONE (OUTPATIENT)
Dept: INTERNAL MEDICINE | Facility: CLINIC | Age: 64
End: 2019-03-05

## 2019-03-05 NOTE — TELEPHONE ENCOUNTER
Rebecca from [place], pre admission testing, needs a surgical clearance letter for left total knee replacement.  Needs this today ASAP.  Fax# [phone],  [phone].

## 2019-03-05 NOTE — TELEPHONE ENCOUNTER
Spoke with Rebecca and she said that she just needed the clearance even though we are awaiting cardiac clearance. She is aware of this and the date of her cardiology appt and that Tracey's note does state awaiting cardiac clearance.     Everything has been faxed to Rebecca.

## 2019-03-06 ENCOUNTER — HOSPITAL ENCOUNTER (OUTPATIENT)
Dept: BONE DENSITY | Facility: HOSPITAL | Age: 64
Discharge: HOME | End: 2019-03-06
Admitting: NURSE PRACTITIONER

## 2019-03-06 ENCOUNTER — PREP FOR SURGERY (OUTPATIENT)
Dept: OTHER | Facility: HOSPITAL | Age: 64
End: 2019-03-06

## 2019-03-06 ENCOUNTER — APPOINTMENT (OUTPATIENT)
Dept: BONE DENSITY | Facility: HOSPITAL | Age: 64
End: 2019-03-06

## 2019-03-06 DIAGNOSIS — R94.31 ABNORMAL EKG: Primary | ICD-10-CM

## 2019-03-06 PROCEDURE — 77080 DXA BONE DENSITY AXIAL: CPT

## 2019-03-06 NOTE — PROGRESS NOTES
[facility] at [facility] - Office Note  Livier Gutierrez         [address] [facility] [address]  1955   There are no phone numbers on file.     LOCATION:  [place] office.  Visit Type: New Patient.    PCP:  Tracey Smith, APRN    03/07/19   Livier Gutierrez is a 63 y.o.  female  retired.      Chief Complaint:   Pre Operative Clearance, Abnormal EKG    Problem List/PMHx   1.  Abnormal Ekg       A.  Asymptomatic       B.  Occasional PVC, lateral inverted T waves.   2.  Dyslipidemia  3.  Essential Hypertension  4.  Non Insulin Dependent Diabetes Mellitus II, without complication  5.  Ostearthritis, Bilateral Knee  6.  Obesity  7.  H/O Thyroid Cancer, on chronic supplementation.  H/O radioactive iodine tx.  8.  Acid Reflux  9.  Sarcoidosis       Allergies   Allergen Reactions   • Latex Angioedema   • Penicillins          Current Outpatient Medications:   •  albuterol (PROVENTIL HFA) 108 (90 Base) MCG/ACT inhaler, Inhale 2 puffs Every 4 (Four) Hours As Needed for Wheezing., Disp: 1 inhaler, Rfl: 2  •  aspirin 81 MG tablet, Take 1 tablet by mouth Daily. (Patient taking differently: Take 81 mg by mouth Every Other Day.), Disp: 90 tablet, Rfl: 3  •  atenolol (TENORMIN) 25 MG tablet, Take 1 tablet by mouth Daily., Disp: 90 tablet, Rfl: 1  •  BLACK CURRANT SEED OIL PO, Take  by mouth 2 (Two) Times a Day., Disp: , Rfl:   •  budesonide-formoterol (SYMBICORT) 80-4.5 MCG/ACT inhaler, Inhale 2 puffs 2 (Two) Times a Day., Disp: 1 inhaler, Rfl: 5  •  cholecalciferol (VITAMIN D3) 400 units tablet, Take 50 Units by mouth Daily., Disp: , Rfl:   •  Coenzyme Q10 (COQ10 PO), Take  by mouth As Needed., Disp: , Rfl:   •  diclofenac (VOLTAREN) 75 MG EC tablet, TAKE ONE TABLET BY MOUTH TWICE A DAY AS NEEDED FOR PAIN, Disp: 60 tablet, Rfl: 4  •  glucose blood test strip, 1 po bid as directed- One touch ultra Blue 100, Disp: 100 each, Rfl: 12  •  glucose monitor monitoring kit, 1 each Daily., Disp: 1  each, Rfl: 0  •  Lancets misc, 1 each Daily., Disp: 60 each, Rfl: 11  •  levothyroxine (SYNTHROID, LEVOTHROID) 150 MCG tablet, Take 150 mcg by mouth daily., Disp: , Rfl:   •  metFORMIN (GLUCOPHAGE) 500 MG tablet, Take 1 tablet by mouth Daily., Disp: 90 tablet, Rfl: 1  •  omeprazole (priLOSEC) 20 MG capsule, TAKE ONE CAPSULE BY MOUTH TWICE A DAY WITH DICLOFENAC, Disp: 180 capsule, Rfl: 2  •  polyethyl glycol-propyl glycol (SYSTANE) 0.4-0.3 % solution ophthalmic solution, 1 drop As Needed., Disp: , Rfl:   •  triamcinolone (KENALOG) 0.1 % cream, Apply  topically 2 (Two) Times a Day. (Patient taking differently: Apply  topically to the appropriate area as directed As Needed.), Disp: 45 g, Rfl: 2  •  TURMERIC PO, Take 3,000 mg by mouth Daily., Disp: , Rfl:   •  vitamin B-12 (CYANOCOBALAMIN) 1000 MCG tablet, Take 1,000 mcg by mouth 2 (Two) Times a Day., Disp: , Rfl:   •  vitamin C (ASCORBIC ACID) 500 MG tablet, Take 2,000 mg by mouth Daily., Disp: , Rfl:     HPI  Livier Gutierrez is a 62 yo CF who comes to the office today as a new patient needing pre operative clearance prior to total knee replacements (staged, bilateral).  She has a PMHx of controlled HTN, dyslipidemia.  She also has a history of sarcoidosis with lung involvement.  She has a nodule that is being followed and she is due for a follow-up scan here in the next few weeks.  She was undergoing pre-operative evaluation with a EKG which was read as abnormal.  She is here today for further evaluation.  She has no complaints of chest pain or angina.  She walks with a four-point cane for ambulatory assistance secondary to her osteoarthritis.  She denies exertional dyspnea, denies orthopnea, denies wheezing.  She has no complaints of swelling of her lower extremities, no CHF type symptoms.  She has been treated for diabetes and hypertension for several years.  She was previously on a statin but stopped it secondary to cost issues and is currently on no medications.   "To her knowledge, she has had no cardiac issues related to her sarcoidosis.  It has been several years since she had a stress test, which was reportedly normal.      The following portions of the patient's history were reviewed in the chart and updated as appropriate: allergies, current medications, past family history, past medical history, past social history, past surgical history and problem list.    Review of Systems   Constitution: Positive for malaise/fatigue and weight loss. Negative for weakness.   Cardiovascular: Negative for chest pain, dyspnea on exertion, leg swelling, orthopnea and palpitations.   Respiratory: Negative for cough and shortness of breath.    Musculoskeletal: Positive for arthritis, joint pain and joint swelling.             height is 170.2 cm (67\") and weight is 90.7 kg (200 lb). Her blood pressure is 140/82 and her pulse is 62. Her oxygen saturation is 95%.   Physical Exam   Constitutional: Vital signs are normal. She appears well-developed and well-nourished.   HENT:   Head: Normocephalic and atraumatic.   Right Ear: Hearing and external ear normal.   Left Ear: Hearing and external ear normal.   Nose: Nose normal. No septal deviation.   Mouth/Throat: Oropharynx is clear and moist and mucous membranes are normal.   Eyes: Conjunctivae, EOM and lids are normal. Pupils are equal, round, and reactive to light. Right conjunctiva is not injected. Left conjunctiva is not injected.   Neck: Normal range of motion. No JVD present. Carotid bruit is not present. No edema and no erythema present. No thyroid mass and no thyromegaly present.   Cardiovascular: Normal rate, regular rhythm, S1 normal, S2 normal, normal heart sounds, intact distal pulses and normal pulses. PMI is not displaced.   No murmur heard.  Pulses:       Radial pulses are 2+ on the right side, and 2+ on the left side.        Dorsalis pedis pulses are 2+ on the right side, and 2+ on the left side.        Posterior tibial pulses are " 2+ on the right side, and 2+ on the left side.   Pulmonary/Chest: Effort normal and breath sounds normal. No respiratory distress. She has no decreased breath sounds. She has no wheezes. She has no rhonchi. She has no rales.   Abdominal: Soft. Normal appearance, normal aorta and bowel sounds are normal. She exhibits no abdominal bruit and no mass. There is no hepatosplenomegaly. There is no tenderness.   Musculoskeletal: Normal range of motion.   Neurological: She is alert.   Skin: Skin is warm, dry and intact. No cyanosis. Nails show no clubbing.   Psychiatric: She has a normal mood and affect. Her speech is normal.         Procedures     Assessment/ Plan   Abnormal EKG:  EKG from referring office reviewed - NSR with frequent unifocal PVCs. She has multiple risk factors (age, HTN, HLP, DMII, FHx, obesity) and is symptomatic with exertional dyspnea that I'm not certain is all related to inactivity from ostearthritis.  Will proceed with cardiac PET tomorrow at 1 pm. With her history of sarcoidosis, will also pursue echocardiogram.  Further recommendations based on results.  If normal - will clear for surgery and she can follow up with PCP.  If abnormal, will proceed with C prior to ortho surgery.    Essential hypertension:  Well controlled.  Continue monitoring on current regimen.    Exertional dyspnea:  Will proceed with stress test and echo.  Recommendations based on results.    Dyslipidemia:  Lipid Panel 02/2019 reviewed.  With her DM II, would ideally like LDL<70.  Continue statin, appropriate diet. Activity and weight loss to be addressed post operatively when she is able to be more active.  Continue to follow with PCP with annual lipid panel and CMP.     Total Cholesterol 100 - 199 mg/dL 170    Triglycerides 0 - 149 mg/dL 95    HDL Cholesterol >39 mg/dL 41    VLDL Cholesterol 5 - 40 mg/dL 19    LDL Cholesterol  0 - 99 mg/dL 110 Abnormally high             ABRAHAM Paulson  independently  3/7/2019 1:45 PM

## 2019-03-07 ENCOUNTER — CONSULT (OUTPATIENT)
Dept: CARDIOLOGY | Facility: CLINIC | Age: 64
End: 2019-03-07

## 2019-03-07 VITALS
BODY MASS INDEX: 31.39 KG/M2 | WEIGHT: 200 LBS | SYSTOLIC BLOOD PRESSURE: 140 MMHG | HEART RATE: 62 BPM | OXYGEN SATURATION: 95 % | DIASTOLIC BLOOD PRESSURE: 82 MMHG | HEIGHT: 67 IN

## 2019-03-07 DIAGNOSIS — R94.31 ABNORMAL EKG: Primary | ICD-10-CM

## 2019-03-07 DIAGNOSIS — R06.09 EXERTIONAL DYSPNEA: ICD-10-CM

## 2019-03-07 DIAGNOSIS — E78.5 DYSLIPIDEMIA: ICD-10-CM

## 2019-03-07 DIAGNOSIS — I10 ESSENTIAL HYPERTENSION: ICD-10-CM

## 2019-03-07 PROCEDURE — 99214 OFFICE O/P EST MOD 30 MIN: CPT | Performed by: PHYSICIAN ASSISTANT

## 2019-03-07 RX ORDER — ASCORBIC ACID 500 MG
2000 TABLET ORAL DAILY
COMMUNITY

## 2019-03-07 RX ORDER — LANOLIN ALCOHOL/MO/W.PET/CERES
1000 CREAM (GRAM) TOPICAL 2 TIMES DAILY
COMMUNITY

## 2019-03-07 RX ORDER — OMEGA-3S/DHA/EPA/FISH OIL/D3 300MG-1000
50 CAPSULE ORAL DAILY
COMMUNITY

## 2019-03-08 ENCOUNTER — TELEPHONE (OUTPATIENT)
Dept: CARDIOLOGY | Facility: CLINIC | Age: 64
End: 2019-03-08

## 2019-03-08 ENCOUNTER — HOSPITAL ENCOUNTER (OUTPATIENT)
Dept: CARDIOLOGY | Facility: HOSPITAL | Age: 64
Discharge: HOME | End: 2019-03-08

## 2019-03-08 ENCOUNTER — HOSPITAL ENCOUNTER (OUTPATIENT)
Dept: CARDIOLOGY | Facility: HOSPITAL | Age: 64
Discharge: HOME | End: 2019-03-08
Admitting: PHYSICIAN ASSISTANT

## 2019-03-08 ENCOUNTER — TELEPHONE (OUTPATIENT)
Dept: INTERNAL MEDICINE | Facility: CLINIC | Age: 64
End: 2019-03-08

## 2019-03-08 VITALS
SYSTOLIC BLOOD PRESSURE: 136 MMHG | WEIGHT: 200 LBS | DIASTOLIC BLOOD PRESSURE: 89 MMHG | BODY MASS INDEX: 31.39 KG/M2 | HEIGHT: 67 IN

## 2019-03-08 DIAGNOSIS — R94.31 ABNORMAL EKG: ICD-10-CM

## 2019-03-08 LAB
BH CV ECHO MEAS - AO MAX PG (FULL): 2.7 MMHG
BH CV ECHO MEAS - AO MAX PG: 8.5 MMHG
BH CV ECHO MEAS - AO MEAN PG (FULL): 1.6 MMHG
BH CV ECHO MEAS - AO MEAN PG: 4.7 MMHG
BH CV ECHO MEAS - AO ROOT AREA (BSA CORRECTED): 1.4
BH CV ECHO MEAS - AO ROOT AREA: 6.2 CM^2
BH CV ECHO MEAS - AO ROOT DIAM: 2.8 CM
BH CV ECHO MEAS - AO V2 MAX: 146 CM/SEC
BH CV ECHO MEAS - AO V2 MEAN: 99.8 CM/SEC
BH CV ECHO MEAS - AO V2 VTI: 28.8 CM
BH CV ECHO MEAS - ASC AORTA: 3 CM
BH CV ECHO MEAS - AVA(I,A): 3 CM^2
BH CV ECHO MEAS - AVA(I,D): 3 CM^2
BH CV ECHO MEAS - AVA(V,A): 2.5 CM^2
BH CV ECHO MEAS - AVA(V,D): 2.5 CM^2
BH CV ECHO MEAS - BSA(HAYCOCK): 2.1 M^2
BH CV ECHO MEAS - BSA: 2 M^2
BH CV ECHO MEAS - BZI_BMI: 31.3 KILOGRAMS/M^2
BH CV ECHO MEAS - BZI_METRIC_HEIGHT: 170.2 CM
BH CV ECHO MEAS - BZI_METRIC_WEIGHT: 90.7 KG
BH CV ECHO MEAS - EDV(CUBED): 114.6 ML
BH CV ECHO MEAS - EDV(MOD-SP2): 77 ML
BH CV ECHO MEAS - EDV(MOD-SP4): 99 ML
BH CV ECHO MEAS - EDV(TEICH): 110.5 ML
BH CV ECHO MEAS - EF(CUBED): 76.2 %
BH CV ECHO MEAS - EF(MOD-BP): 60 %
BH CV ECHO MEAS - EF(MOD-SP2): 62.3 %
BH CV ECHO MEAS - EF(MOD-SP4): 58.6 %
BH CV ECHO MEAS - EF(TEICH): 68.1 %
BH CV ECHO MEAS - ESV(CUBED): 27.3 ML
BH CV ECHO MEAS - ESV(MOD-SP2): 29 ML
BH CV ECHO MEAS - ESV(MOD-SP4): 41 ML
BH CV ECHO MEAS - ESV(TEICH): 35.3 ML
BH CV ECHO MEAS - FS: 38 %
BH CV ECHO MEAS - IVS/LVPW: 0.98
BH CV ECHO MEAS - IVSD: 1 CM
BH CV ECHO MEAS - LA DIMENSION: 3.8 CM
BH CV ECHO MEAS - LA/AO: 1.4
BH CV ECHO MEAS - LAD MAJOR: 4.5 CM
BH CV ECHO MEAS - LAT PEAK E' VEL: 6.3 CM/SEC
BH CV ECHO MEAS - LATERAL E/E' RATIO: 9.5
BH CV ECHO MEAS - LV DIASTOLIC VOL/BSA (35-75): 49 ML/M^2
BH CV ECHO MEAS - LV MASS(C)D: 175.1 GRAMS
BH CV ECHO MEAS - LV MASS(C)DI: 86.6 GRAMS/M^2
BH CV ECHO MEAS - LV MAX PG: 5.8 MMHG
BH CV ECHO MEAS - LV MEAN PG: 3.1 MMHG
BH CV ECHO MEAS - LV SYSTOLIC VOL/BSA (12-30): 20.3 ML/M^2
BH CV ECHO MEAS - LV V1 MAX: 120.9 CM/SEC
BH CV ECHO MEAS - LV V1 MEAN: 81.3 CM/SEC
BH CV ECHO MEAS - LV V1 VTI: 28.1 CM
BH CV ECHO MEAS - LVIDD: 4.9 CM
BH CV ECHO MEAS - LVIDS: 3 CM
BH CV ECHO MEAS - LVLD AP2: 6.7 CM
BH CV ECHO MEAS - LVLD AP4: 7.2 CM
BH CV ECHO MEAS - LVLS AP2: 5.8 CM
BH CV ECHO MEAS - LVLS AP4: 6.4 CM
BH CV ECHO MEAS - LVOT AREA (M): 3.1 CM^2
BH CV ECHO MEAS - LVOT AREA: 3 CM^2
BH CV ECHO MEAS - LVOT DIAM: 2 CM
BH CV ECHO MEAS - LVPWD: 1 CM
BH CV ECHO MEAS - MED PEAK E' VEL: 3.9 CM/SEC
BH CV ECHO MEAS - MEDIAL E/E' RATIO: 15
BH CV ECHO MEAS - MV A MAX VEL: 84.4 CM/SEC
BH CV ECHO MEAS - MV DEC TIME: 0.12 SEC
BH CV ECHO MEAS - MV E MAX VEL: 61.2 CM/SEC
BH CV ECHO MEAS - MV E/A: 0.73
BH CV ECHO MEAS - PA ACC SLOPE: 854.8 CM/SEC^2
BH CV ECHO MEAS - PA ACC TIME: 0.11 SEC
BH CV ECHO MEAS - PA MAX PG: 4.9 MMHG
BH CV ECHO MEAS - PA PR(ACCEL): 27.5 MMHG
BH CV ECHO MEAS - PA V2 MAX: 109.9 CM/SEC
BH CV ECHO MEAS - RAP SYSTOLE: 8 MMHG
BH CV ECHO MEAS - RVSP: 36 MMHG
BH CV ECHO MEAS - SI(AO): 88.2 ML/M^2
BH CV ECHO MEAS - SI(CUBED): 43.2 ML/M^2
BH CV ECHO MEAS - SI(LVOT): 42.1 ML/M^2
BH CV ECHO MEAS - SI(MOD-SP2): 23.7 ML/M^2
BH CV ECHO MEAS - SI(MOD-SP4): 28.7 ML/M^2
BH CV ECHO MEAS - SI(TEICH): 37.2 ML/M^2
BH CV ECHO MEAS - SV(AO): 178.3 ML
BH CV ECHO MEAS - SV(CUBED): 87.3 ML
BH CV ECHO MEAS - SV(LVOT): 85.1 ML
BH CV ECHO MEAS - SV(MOD-SP2): 48 ML
BH CV ECHO MEAS - SV(MOD-SP4): 58 ML
BH CV ECHO MEAS - SV(TEICH): 75.2 ML
BH CV ECHO MEAS - TAPSE (>1.6): 3 CM2
BH CV ECHO MEAS - TR MAX PG: 28 MMHG
BH CV ECHO MEAS - TR MAX VEL: 264.2 CM/SEC
BH CV ECHO MEAS - TV MAX PG: 1.2 MMHG
BH CV ECHO MEAS - TV V2 MAX: 53.8 CM/SEC
BH CV ECHO MEASUREMENTS AVERAGE E/E' RATIO: 12
BH CV NUCLEAR PRIOR STUDY: 2
BH CV STRESS BP STAGE 1: NORMAL
BH CV STRESS BP STAGE 3: NORMAL
BH CV STRESS COMMENTS STAGE 1: NORMAL
BH CV STRESS DOSE REGADENOSON STAGE 1: 0.4
BH CV STRESS DURATION MIN STAGE 1: 1
BH CV STRESS DURATION MIN STAGE 2: 1
BH CV STRESS DURATION MIN STAGE 3: 1
BH CV STRESS DURATION MIN STAGE 4: 1
BH CV STRESS DURATION SEC STAGE 1: 0
BH CV STRESS DURATION SEC STAGE 2: 0
BH CV STRESS DURATION SEC STAGE 3: 0
BH CV STRESS DURATION SEC STAGE 4: 0
BH CV STRESS HR STAGE 1: 76
BH CV STRESS HR STAGE 2: 81
BH CV STRESS HR STAGE 3: 75
BH CV STRESS HR STAGE 4: 78
BH CV STRESS O2 STAGE 1: 99
BH CV STRESS O2 STAGE 2: 100
BH CV STRESS O2 STAGE 3: 99
BH CV STRESS O2 STAGE 4: 97
BH CV STRESS PROTOCOL 1: NORMAL
BH CV STRESS RECOVERY BP: NORMAL MMHG
BH CV STRESS RECOVERY HR: 76 BPM
BH CV STRESS RECOVERY O2: 97 %
BH CV STRESS STAGE 1: 1
BH CV STRESS STAGE 2: 2
BH CV STRESS STAGE 3: 3
BH CV STRESS STAGE 4: 4
BH CV XLRA - RV BASE: 3.3 CM
BH CV XLRA - RV LENGTH: 5.2 CM
BH CV XLRA - RV MID: 2.9 CM
BH CV XLRA - TDI S': 13.7 CM/SEC
LV EF 2D ECHO EST: 60 %
LV EF NUC BP: 57 %
MAXIMAL PREDICTED HEART RATE: 155 BPM
PERCENT MAX PREDICTED HR: 55.48 %
STRESS BASELINE BP: NORMAL MMHG
STRESS BASELINE HR: 61 BPM
STRESS O2 SAT REST: 97 %
STRESS PERCENT HR: 65 %
STRESS POST ESTIMATED WORKLOAD: 1 METS
STRESS POST EXERCISE DUR MIN: 4 MIN
STRESS POST EXERCISE DUR SEC: 0 SEC
STRESS POST O2 SAT PEAK: 100 %
STRESS POST PEAK BP: NORMAL MMHG
STRESS POST PEAK HR: 86 BPM
STRESS TARGET HR: 132 BPM

## 2019-03-08 PROCEDURE — 93306 TTE W/DOPPLER COMPLETE: CPT | Performed by: INTERNAL MEDICINE

## 2019-03-08 PROCEDURE — 93017 CV STRESS TEST TRACING ONLY: CPT

## 2019-03-08 PROCEDURE — 78492 MYOCRD IMG PET MLT RST&STRS: CPT | Performed by: INTERNAL MEDICINE

## 2019-03-08 PROCEDURE — 25010000002 REGADENOSON 0.4 MG/5ML SOLUTION: Performed by: PHYSICIAN ASSISTANT

## 2019-03-08 PROCEDURE — 93018 CV STRESS TEST I&R ONLY: CPT | Performed by: INTERNAL MEDICINE

## 2019-03-08 PROCEDURE — 78492 MYOCRD IMG PET MLT RST&STRS: CPT

## 2019-03-08 PROCEDURE — 93306 TTE W/DOPPLER COMPLETE: CPT

## 2019-03-08 PROCEDURE — A9555 RB82 RUBIDIUM: HCPCS | Performed by: PHYSICIAN ASSISTANT

## 2019-03-08 PROCEDURE — 0 RUBIDIUM CHLORIDE: Performed by: PHYSICIAN ASSISTANT

## 2019-03-08 RX ADMIN — RUBIDIUM CHLORIDE RB-82 1 DOSE: 150 INJECTION, SOLUTION INTRAVENOUS at 13:05

## 2019-03-08 RX ADMIN — RUBIDIUM CHLORIDE RB-82 1 DOSE: 150 INJECTION, SOLUTION INTRAVENOUS at 13:15

## 2019-03-08 RX ADMIN — REGADENOSON 0.4 MG: 0.08 INJECTION, SOLUTION INTRAVENOUS at 13:19

## 2019-03-08 NOTE — TELEPHONE ENCOUNTER
Marychuy SOTO at [facility] called for cardiac clearance for upcoming orthopaedic surgery next week with . Told her stress test and echo ordered for this afternoon and once we had the results we would let her know. If patient is cleared she wants it faxed to [phone].

## 2019-03-08 NOTE — TELEPHONE ENCOUNTER
Alexsandra Crowell PA [place], Susan D RN             Stress test is normal. She is cleared for ortho surgery on the 12th.  Please send letter to DR. MARTINI and notify patient.  Thank you very much.   I do not have echo report yet.     CHARLES Paulson     Called patient to relay message above  from Lisha SOTO . Granddaughter took message. Instructed her to have patient call office with any questions. She verbalized understanding. Clearance letter sent to .

## 2019-08-01 ENCOUNTER — OFFICE VISIT (OUTPATIENT)
Dept: INTERNAL MEDICINE | Facility: CLINIC | Age: 64
End: 2019-08-01

## 2019-08-01 VITALS
HEART RATE: 70 BPM | DIASTOLIC BLOOD PRESSURE: 86 MMHG | SYSTOLIC BLOOD PRESSURE: 138 MMHG | HEIGHT: 67 IN | OXYGEN SATURATION: 99 % | BODY MASS INDEX: 33.4 KG/M2 | RESPIRATION RATE: 16 BRPM | TEMPERATURE: 97.9 F | WEIGHT: 212.8 LBS

## 2019-08-01 DIAGNOSIS — E55.9 VITAMIN D DEFICIENCY: ICD-10-CM

## 2019-08-01 DIAGNOSIS — E11.9 TYPE 2 DIABETES MELLITUS WITHOUT COMPLICATION, WITHOUT LONG-TERM CURRENT USE OF INSULIN (HCC): Primary | ICD-10-CM

## 2019-08-01 DIAGNOSIS — M54.12 CERVICAL RADICULOPATHY: ICD-10-CM

## 2019-08-01 DIAGNOSIS — M17.0 PRIMARY OSTEOARTHRITIS OF BOTH KNEES: ICD-10-CM

## 2019-08-01 DIAGNOSIS — Z85.850 HISTORY OF THYROID CANCER: ICD-10-CM

## 2019-08-01 DIAGNOSIS — I10 ESSENTIAL HYPERTENSION: ICD-10-CM

## 2019-08-01 DIAGNOSIS — E78.5 DYSLIPIDEMIA: ICD-10-CM

## 2019-08-01 DIAGNOSIS — M25.511 RIGHT SHOULDER PAIN, UNSPECIFIED CHRONICITY: ICD-10-CM

## 2019-08-01 LAB — HBA1C MFR BLD: 5.4 %

## 2019-08-01 PROCEDURE — 83036 HEMOGLOBIN GLYCOSYLATED A1C: CPT | Performed by: NURSE PRACTITIONER

## 2019-08-01 PROCEDURE — 99214 OFFICE O/P EST MOD 30 MIN: CPT | Performed by: NURSE PRACTITIONER

## 2019-08-01 RX ORDER — ATENOLOL 25 MG/1
25 TABLET ORAL DAILY
Qty: 90 TABLET | Refills: 1 | Status: SHIPPED | OUTPATIENT
Start: 2019-08-01 | End: 2020-02-17

## 2019-08-01 RX ORDER — DICLOFENAC SODIUM 75 MG/1
75 TABLET, DELAYED RELEASE ORAL 2 TIMES DAILY PRN
Qty: 180 TABLET | Refills: 1 | Status: SHIPPED | OUTPATIENT
Start: 2019-08-01 | End: 2020-02-17

## 2019-08-01 NOTE — PROGRESS NOTES
Subjective   Livier Gutierrez is a 64 y.o. female    Chief Complaint   Patient presents with   • Follow-up   • Diabetes   • Hypertension   • Hyperlipidemia   • Heartburn     History of Present Illness     DM II - does not ck FS @ home; currently on 500mg of Metformin daily; A1C today is 5.4%.  Denies any medication SE's.     Has seen podiatry in the past for BLE N/T.  An EMG of her LE's was ordered for BLE pain, N/T.  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  She was started on Gabapentin, but she did not tolerate, so she stopped this.  States that sx's are stable/improved since her knee replacements.  Wound on her right 2nd toe has completely healed.  Last podiatry f/u was in June 2019      HTN - chronic and stable on Cozaar, Atenolol and HCTZ.  Denies medication SE's.        HL - has been off of her pravastatin for a while; made her feel terrible - she is fasting for labs      H/O thyroid CA - managed by oncologist at  currently has her on 150mcg; Let her go to the [facility]. She is now seeing her every 6 months.        GERD is chronic and stable on Omeprazole, but only takes it PRN      Allergies are doing ok. Taking Claritin everyday, but likes to have an albuterol inhaler on hand for wheezing/SOA.       Bilateral knee pain - Livier underwent bilateral knee replacements since her last visit per Dr. Bruno; right 4/29/19 and left 3/12/2019      Flu shot - fall 2018  Pneumovax 10/13   Zostavax, declines  Prevnar - 4/19/16  Tdap 2012   DEXA - 3/6/2019  Ovarian Ca screening; [facility]   Mamm 12/20/2018  Pap - 7/2014; no longer doing ([facility])  Colon 2016 ([facility]); repeating in 1 year this time, he found some mild crohns - she called is office and since she is sx free, he is going to hold off on a colon for now.    Eye exam - 1/2019 at   Foot exam 1/2019 per podiatry    The following portions of the patient's history were reviewed and updated as appropriate: allergies, current medications, past family  history, past medical history, past social history, past surgical history and problem list.    Current Outpatient Medications:   •  aspirin 81 MG tablet, Take 1 tablet by mouth Daily. (Patient taking differently: Take 81 mg by mouth Every Other Day.), Disp: 90 tablet, Rfl: 3  •  atenolol (TENORMIN) 25 MG tablet, Take 1 tablet by mouth Daily., Disp: 90 tablet, Rfl: 1  •  BLACK CURRANT SEED OIL PO, Take  by mouth 2 (Two) Times a Day., Disp: , Rfl:   •  budesonide-formoterol (SYMBICORT) 80-4.5 MCG/ACT inhaler, Inhale 2 puffs 2 (Two) Times a Day., Disp: 1 inhaler, Rfl: 5  •  cholecalciferol (VITAMIN D3) 400 units tablet, Take 50 Units by mouth Daily., Disp: , Rfl:   •  diclofenac (VOLTAREN) 75 MG EC tablet, Take 1 tablet by mouth 2 (Two) Times a Day As Needed (pain)., Disp: 180 tablet, Rfl: 1  •  levothyroxine (SYNTHROID, LEVOTHROID) 150 MCG tablet, Take 150 mcg by mouth daily., Disp: , Rfl:   •  metFORMIN (GLUCOPHAGE) 500 MG tablet, Take 1 tablet by mouth Daily., Disp: 90 tablet, Rfl: 1  •  omeprazole (priLOSEC) 20 MG capsule, TAKE ONE CAPSULE BY MOUTH TWICE A DAY WITH DICLOFENAC, Disp: 180 capsule, Rfl: 2  •  triamcinolone (KENALOG) 0.1 % cream, Apply  topically 2 (Two) Times a Day. (Patient taking differently: Apply  topically to the appropriate area as directed As Needed.), Disp: 45 g, Rfl: 2  •  TURMERIC PO, Take 3,000 mg by mouth Daily., Disp: , Rfl:   •  vitamin B-12 (CYANOCOBALAMIN) 1000 MCG tablet, Take 1,000 mcg by mouth 2 (Two) Times a Day., Disp: , Rfl:   •  vitamin C (ASCORBIC ACID) 500 MG tablet, Take 2,000 mg by mouth Daily., Disp: , Rfl:   •  albuterol (PROVENTIL HFA) 108 (90 Base) MCG/ACT inhaler, Inhale 2 puffs Every 4 (Four) Hours As Needed for Wheezing., Disp: 1 inhaler, Rfl: 2  •  Coenzyme Q10 (COQ10 PO), Take  by mouth As Needed., Disp: , Rfl:   •  glucose blood test strip, 1 po bid as directed- One touch ultra Blue 100, Disp: 100 each, Rfl: 12  •  glucose monitor monitoring kit, 1 each Daily.,  "Disp: 1 each, Rfl: 0  •  Lancets misc, 1 each Daily., Disp: 60 each, Rfl: 11  •  polyethyl glycol-propyl glycol (SYSTANE) 0.4-0.3 % solution ophthalmic solution, 1 drop As Needed., Disp: , Rfl:      Review of Systems   Constitutional: Negative for chills, fatigue and fever.   Respiratory: Negative for cough, chest tightness and shortness of breath.    Cardiovascular: Negative for chest pain.   Gastrointestinal: Negative for abdominal pain, diarrhea, nausea and vomiting.   Endocrine: Negative for cold intolerance and heat intolerance.   Musculoskeletal: Negative for arthralgias.   Neurological: Negative for dizziness.       Objective   Physical Exam   Constitutional: She is oriented to person, place, and time. She appears well-developed and well-nourished.   HENT:   Head: Normocephalic and atraumatic.   Eyes: Conjunctivae and EOM are normal. Pupils are equal, round, and reactive to light.   Neck: Normal range of motion.   Cardiovascular: Normal rate, regular rhythm and normal heart sounds.   Pulmonary/Chest: Effort normal and breath sounds normal.   Abdominal: Soft. Bowel sounds are normal.   Musculoskeletal: Normal range of motion.   Neurological: She is alert and oriented to person, place, and time. She has normal reflexes.   Skin: Skin is warm and dry.   Psychiatric: She has a normal mood and affect. Her behavior is normal. Judgment and thought content normal.     Vitals:    08/01/19 0926   BP: 138/86   Pulse: 70   Resp: 16   Temp: 97.9 °F (36.6 °C)   TempSrc: Temporal   SpO2: 99%   Weight: 96.5 kg (212 lb 12.8 oz)   Height: 170.2 cm (67.01\")         Assessment/Plan   Livier was seen today for follow-up, diabetes, hypertension, hyperlipidemia and heartburn.    Diagnoses and all orders for this visit:    Type 2 diabetes mellitus without complication, without long-term current use of insulin (CMS/[facility])  -     POC Glycosylated Hemoglobin (Hb A1C)  -     CBC & Differential  -     Comprehensive Metabolic Panel  -     " Lipid Panel  -     TSH  -     Vitamin B12  -     Vitamin D 25 Hydroxy    Essential hypertension  -     atenolol (TENORMIN) 25 MG tablet; Take 1 tablet by mouth Daily.  -     CBC & Differential  -     Comprehensive Metabolic Panel  -     Lipid Panel  -     TSH  -     Vitamin B12  -     Vitamin D 25 Hydroxy    Cervical radiculopathy  -     diclofenac (VOLTAREN) 75 MG EC tablet; Take 1 tablet by mouth 2 (Two) Times a Day As Needed (pain).  -     CBC & Differential  -     Comprehensive Metabolic Panel  -     Lipid Panel  -     TSH  -     Vitamin B12  -     Vitamin D 25 Hydroxy    Right shoulder pain, unspecified chronicity  -     diclofenac (VOLTAREN) 75 MG EC tablet; Take 1 tablet by mouth 2 (Two) Times a Day As Needed (pain).  -     CBC & Differential  -     Comprehensive Metabolic Panel  -     Lipid Panel  -     TSH  -     Vitamin B12  -     Vitamin D 25 Hydroxy    Dyslipidemia  -     CBC & Differential  -     Comprehensive Metabolic Panel  -     Lipid Panel  -     TSH  -     Vitamin B12  -     Vitamin D 25 Hydroxy    History of thyroid cancer  -     CBC & Differential  -     Comprehensive Metabolic Panel  -     Lipid Panel  -     TSH  -     Vitamin B12  -     Vitamin D 25 Hydroxy    Primary osteoarthritis of both knees  -     CBC & Differential  -     Comprehensive Metabolic Panel  -     Lipid Panel  -     TSH  -     Vitamin B12  -     Vitamin D 25 Hydroxy    Vitamin D deficiency   -     Vitamin D 25 Hydroxy      She will back on her Metformin and only take every other day due to A1c of 5.4%  Labs sent  Meds refilled  No other changes  She will RTC for flu shot later this fall  Return in about 3 months (around 11/1/2019).

## 2019-08-02 DIAGNOSIS — E83.51 HYPOCALCEMIA: Primary | ICD-10-CM

## 2019-08-02 DIAGNOSIS — D64.9 ANEMIA, UNSPECIFIED TYPE: ICD-10-CM

## 2019-08-02 LAB
25(OH)D3+25(OH)D2 SERPL-MCNC: 47.3 NG/ML (ref 30–100)
ALBUMIN SERPL-MCNC: 3.6 G/DL (ref 3.5–5.2)
ALBUMIN/GLOB SERPL: 1.2 G/DL
ALP SERPL-CCNC: 48 U/L (ref 39–117)
ALT SERPL-CCNC: 12 U/L (ref 1–33)
AST SERPL-CCNC: 16 U/L (ref 1–32)
BASOPHILS # BLD AUTO: 0.05 10*3/MM3 (ref 0–0.2)
BASOPHILS NFR BLD AUTO: 0.7 % (ref 0–1.5)
BILIRUB SERPL-MCNC: 0.3 MG/DL (ref 0.2–1.2)
BUN SERPL-MCNC: 17 MG/DL (ref 8–23)
BUN/CREAT SERPL: 17.7 (ref 7–25)
CALCIUM SERPL-MCNC: 8.1 MG/DL (ref 8.6–10.5)
CHLORIDE SERPL-SCNC: 103 MMOL/L (ref 98–107)
CHOLEST SERPL-MCNC: 157 MG/DL (ref 0–200)
CO2 SERPL-SCNC: 27.7 MMOL/L (ref 22–29)
CREAT SERPL-MCNC: 0.96 MG/DL (ref 0.57–1)
EOSINOPHIL # BLD AUTO: 0.24 10*3/MM3 (ref 0–0.4)
EOSINOPHIL NFR BLD AUTO: 3.3 % (ref 0.3–6.2)
ERYTHROCYTE [DISTWIDTH] IN BLOOD BY AUTOMATED COUNT: 18.5 % (ref 12.3–15.4)
GLOBULIN SER CALC-MCNC: 3 GM/DL
GLUCOSE SERPL-MCNC: 88 MG/DL (ref 65–99)
HCT VFR BLD AUTO: 33.7 % (ref 34–46.6)
HDLC SERPL-MCNC: 38 MG/DL (ref 40–60)
HGB BLD-MCNC: 9.7 G/DL (ref 12–15.9)
IMM GRANULOCYTES # BLD AUTO: 0.02 10*3/MM3 (ref 0–0.05)
IMM GRANULOCYTES NFR BLD AUTO: 0.3 % (ref 0–0.5)
LDLC SERPL CALC-MCNC: 95 MG/DL (ref 0–100)
LYMPHOCYTES # BLD AUTO: 1.5 10*3/MM3 (ref 0.7–3.1)
LYMPHOCYTES NFR BLD AUTO: 20.9 % (ref 19.6–45.3)
MCH RBC QN AUTO: 22.1 PG (ref 26.6–33)
MCHC RBC AUTO-ENTMCNC: 28.8 G/DL (ref 31.5–35.7)
MCV RBC AUTO: 76.9 FL (ref 79–97)
MONOCYTES # BLD AUTO: 0.72 10*3/MM3 (ref 0.1–0.9)
MONOCYTES NFR BLD AUTO: 10 % (ref 5–12)
NEUTROPHILS # BLD AUTO: 4.64 10*3/MM3 (ref 1.7–7)
NEUTROPHILS NFR BLD AUTO: 64.8 % (ref 42.7–76)
NRBC BLD AUTO-RTO: 0 /100 WBC (ref 0–0.2)
PLATELET # BLD AUTO: 346 10*3/MM3 (ref 140–450)
POTASSIUM SERPL-SCNC: 4.5 MMOL/L (ref 3.5–5.2)
PROT SERPL-MCNC: 6.6 G/DL (ref 6–8.5)
RBC # BLD AUTO: 4.38 10*6/MM3 (ref 3.77–5.28)
SODIUM SERPL-SCNC: 141 MMOL/L (ref 136–145)
TRIGL SERPL-MCNC: 118 MG/DL (ref 0–150)
TSH SERPL DL<=0.005 MIU/L-ACNC: 1.34 UIU/ML (ref 0.45–4.5)
VIT B12 SERPL-MCNC: 1491 PG/ML (ref 211–946)
VLDLC SERPL CALC-MCNC: 23.6 MG/DL
WBC # BLD AUTO: 7.17 10*3/MM3 (ref 3.4–10.8)

## 2019-08-05 ENCOUNTER — TELEPHONE (OUTPATIENT)
Dept: INTERNAL MEDICINE | Facility: CLINIC | Age: 64
End: 2019-08-05

## 2019-08-05 RX ORDER — OMEPRAZOLE 20 MG/1
CAPSULE, DELAYED RELEASE ORAL
Qty: 180 CAPSULE | Refills: 1 | Status: SHIPPED | OUTPATIENT
Start: 2019-08-05 | End: 2020-05-19 | Stop reason: SDUPTHER

## 2019-08-05 NOTE — TELEPHONE ENCOUNTER
----- Message from GUSTAVO Marcelino sent at 8/2/2019 12:52 PM EDT -----  Labs show anemia.  Has she noted any blood in her stool?  I would like to ck some additional labs next week and have her do 3 occult cards - orders entered.      Calcium is low and I would like to ck some additional labs in relation to this as well.      All other labs were within acceptable limits.

## 2019-08-05 NOTE — TELEPHONE ENCOUNTER
Livier has been notified. She verbalized understanding and will stop by the office sometime this week for the additional labs and  stool cards.     She has not noted any blood in her stool.

## 2019-08-20 ENCOUNTER — LAB (OUTPATIENT)
Dept: INTERNAL MEDICINE | Facility: CLINIC | Age: 64
End: 2019-08-20

## 2019-08-20 DIAGNOSIS — D64.9 ANEMIA, UNSPECIFIED TYPE: ICD-10-CM

## 2019-08-20 DIAGNOSIS — E83.51 HYPOCALCEMIA: ICD-10-CM

## 2019-08-21 LAB
BASOPHILS # BLD AUTO: (no result) 10*3/UL
CA-I SERPL ISE-MCNC: 4.7 MG/DL (ref 4.5–5.6)
CALCIUM SERPL-MCNC: 8.2 MG/DL (ref 8.7–10.3)
DIFFERENTIAL COMMENT: ABNORMAL
EOSINOPHIL # BLD AUTO: (no result) 10*3/UL
EOSINOPHIL # BLD MANUAL: 0.42 10*3/MM3 (ref 0–0.4)
EOSINOPHIL NFR BLD AUTO: (no result) %
EOSINOPHIL NFR BLD MANUAL: 6.3 % (ref 0.3–6.2)
ERYTHROCYTE [DISTWIDTH] IN BLOOD BY AUTOMATED COUNT: 18.6 % (ref 12.3–15.4)
FERRITIN SERPL-MCNC: 14.4 NG/ML (ref 13–150)
FOLATE SERPL-MCNC: >20 NG/ML (ref 4.78–24.2)
HCT VFR BLD AUTO: 32.6 % (ref 34–46.6)
HGB BLD-MCNC: 9.6 G/DL (ref 12–15.9)
INTACT PTH: ABNORMAL
IRON SATN MFR SERPL: 13 % (ref 20–50)
IRON SERPL-MCNC: 44 MCG/DL (ref 37–145)
LYMPHOCYTES # BLD AUTO: (no result) 10*3/UL
LYMPHOCYTES # BLD MANUAL: 1.04 10*3/MM3 (ref 0.7–3.1)
LYMPHOCYTES NFR BLD AUTO: (no result) %
LYMPHOCYTES NFR BLD MANUAL: 15.6 % (ref 19.6–45.3)
MCH RBC QN AUTO: 22.1 PG (ref 26.6–33)
MCHC RBC AUTO-ENTMCNC: 29.4 G/DL (ref 31.5–35.7)
MCV RBC AUTO: 74.9 FL (ref 79–97)
MONOCYTES # BLD MANUAL: 0.07 10*3/MM3 (ref 0.1–0.9)
MONOCYTES NFR BLD AUTO: (no result) %
MONOCYTES NFR BLD MANUAL: 1 % (ref 5–12)
NEUTROPHILS # BLD MANUAL: 5.12 10*3/MM3 (ref 1.7–7)
NEUTROPHILS NFR BLD AUTO: (no result) %
NEUTROPHILS NFR BLD MANUAL: 77.1 % (ref 42.7–76)
PLATELET # BLD AUTO: 352 10*3/MM3 (ref 140–450)
PLATELET BLD QL SMEAR: ABNORMAL
PTH-INTACT SERPL-MCNC: 21 PG/ML (ref 15–65)
RBC # BLD AUTO: 4.35 10*6/MM3 (ref 3.77–5.28)
RBC MORPH BLD: ABNORMAL
TIBC SERPL-MCNC: 340 MCG/DL
UIBC SERPL-MCNC: 296 MCG/DL (ref 112–346)
WBC # BLD AUTO: 6.64 10*3/MM3 (ref 3.4–10.8)

## 2019-08-22 RX ORDER — LOSARTAN POTASSIUM 25 MG/1
25 TABLET ORAL DAILY
Qty: 90 TABLET | Refills: 1 | Status: SHIPPED | OUTPATIENT
Start: 2019-08-22 | End: 2020-04-01

## 2019-08-23 RX ORDER — LANOLIN ALCOHOL/MO/W.PET/CERES
325 CREAM (GRAM) TOPICAL 2 TIMES DAILY
Qty: 60 TABLET | Refills: 11 | Status: SHIPPED | OUTPATIENT
Start: 2019-08-23 | End: 2020-08-22

## 2019-08-28 ENCOUNTER — TELEPHONE (OUTPATIENT)
Dept: INTERNAL MEDICINE | Facility: CLINIC | Age: 64
End: 2019-08-28

## 2019-09-03 DIAGNOSIS — D50.8 OTHER IRON DEFICIENCY ANEMIA: Primary | ICD-10-CM

## 2019-09-03 NOTE — TELEPHONE ENCOUNTER
Livier has been notified. She has not taken any calcium supplements recently but was getting ready to start back on some.     She will start taking the iron supplements and repeat in 4 weeks. She said she never got the occult sample kits when she was here, but will stop by and pick those up to do as well.     Do you want go ahead and put in the future orders for repeat labs?

## 2019-09-13 ENCOUNTER — LAB (OUTPATIENT)
Dept: INTERNAL MEDICINE | Facility: CLINIC | Age: 64
End: 2019-09-13

## 2019-09-13 DIAGNOSIS — D50.8 OTHER IRON DEFICIENCY ANEMIA: ICD-10-CM

## 2019-09-18 ENCOUNTER — RESULTS ENCOUNTER (OUTPATIENT)
Dept: INTERNAL MEDICINE | Facility: CLINIC | Age: 64
End: 2019-09-18

## 2019-09-18 DIAGNOSIS — D50.8 OTHER IRON DEFICIENCY ANEMIA: ICD-10-CM

## 2019-11-15 ENCOUNTER — OFFICE VISIT (OUTPATIENT)
Dept: INTERNAL MEDICINE | Facility: CLINIC | Age: 64
End: 2019-11-15

## 2019-11-15 VITALS
SYSTOLIC BLOOD PRESSURE: 154 MMHG | DIASTOLIC BLOOD PRESSURE: 82 MMHG | HEIGHT: 67 IN | TEMPERATURE: 98.2 F | WEIGHT: 222.4 LBS | HEART RATE: 66 BPM | RESPIRATION RATE: 16 BRPM | BODY MASS INDEX: 34.91 KG/M2 | OXYGEN SATURATION: 100 %

## 2019-11-15 DIAGNOSIS — D50.9 IRON DEFICIENCY ANEMIA, UNSPECIFIED IRON DEFICIENCY ANEMIA TYPE: ICD-10-CM

## 2019-11-15 DIAGNOSIS — M17.0 PRIMARY OSTEOARTHRITIS OF BOTH KNEES: ICD-10-CM

## 2019-11-15 DIAGNOSIS — E55.9 VITAMIN D DEFICIENCY: ICD-10-CM

## 2019-11-15 DIAGNOSIS — I10 ESSENTIAL HYPERTENSION: ICD-10-CM

## 2019-11-15 DIAGNOSIS — K21.9 GASTROESOPHAGEAL REFLUX DISEASE, ESOPHAGITIS PRESENCE NOT SPECIFIED: ICD-10-CM

## 2019-11-15 DIAGNOSIS — E78.5 DYSLIPIDEMIA: ICD-10-CM

## 2019-11-15 DIAGNOSIS — E11.9 TYPE 2 DIABETES MELLITUS WITHOUT COMPLICATION, WITHOUT LONG-TERM CURRENT USE OF INSULIN (HCC): Primary | ICD-10-CM

## 2019-11-15 LAB — HBA1C MFR BLD: 5.1 %

## 2019-11-15 PROCEDURE — 83036 HEMOGLOBIN GLYCOSYLATED A1C: CPT | Performed by: NURSE PRACTITIONER

## 2019-11-15 PROCEDURE — 99214 OFFICE O/P EST MOD 30 MIN: CPT | Performed by: NURSE PRACTITIONER

## 2019-11-15 NOTE — PROGRESS NOTES
Subjective   Livier Gutierrez is a 64 y.o. female    Chief Complaint   Patient presents with   • 3 month follow up   • Diabetes   • Hypertension   • Dyslipidemia   • History of Thyroid cancer     History of Present Illness     Pt was found to be slightly anemic after labs at last visit.  Iron studies were sent and she was started on Ferrous Sulfate    Calcium was also slightly low on labs in august.  PTH and iCa were WNL    DM II - does not ck FS @ home; currently on 500mg of Metformin daily; A1C today is 5.1%, which is down from 5.4% at last check.  Denies any medication SE's.     Has seen podiatry in the past for BLE N/T.  An EMG of her LE's was ordered for BLE pain, N/T.  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  She was started on Gabapentin, but she did not tolerate, so she stopped this.  States that sx's are stable/improved since her knee replacements.  Wound on her right 2nd toe has completely healed.  Last podiatry f/u was in June 2019      HTN - chronic and stable on Cozaar, Atenolol and HCTZ.  Denies medication SE's.  She has not had her BP meds this AM.         HL - has been off of her pravastatin for a while; made her feel terrible - she is fasting for labs      H/O thyroid CA - managed by oncologist at  currently has her on 150mcg; Let [facility] go to the [facility]. She is now seeing her every 6 months.        GERD is chronic and stable on Omeprazole, but only takes it PRN      Allergies are doing ok. Taking Claritin everyday, but likes to have an albuterol inhaler on hand for wheezing/SOA.       Bilateral knee pain - Livier underwent bilateral knee replacements since her last visit per Dr. Bruno; right 4/29/19 and left 3/12/2019      Flu shot - 10/4/2019  Pneumovax 10/13   Zostavax, declines  Prevnar - 4/19/16  Tdap 2012   DEXA - 3/6/2019  Ovarian Ca screening; [facility]   Mamm 12/20/2018  Pap - 7/2014; no longer doing ([facility])  Colon 2016 ([place]); repeating in 1 year this time, he found some  mild crohns - she called is office and since she is sx free, he is going to hold off on a colon for now.    Eye exam - 1/2019 at   Foot exam 1/2019 per podiatry    The following portions of the patient's history were reviewed and updated as appropriate: allergies, current medications, past family history, past medical history, past social history, past surgical history and problem list.    Current Outpatient Medications:   •  atenolol (TENORMIN) 25 MG tablet, Take 1 tablet by mouth Daily., Disp: 90 tablet, Rfl: 1  •  BLACK CURRANT SEED OIL PO, Take  by mouth 2 (Two) Times a Day., Disp: , Rfl:   •  cholecalciferol (VITAMIN D3) 400 units tablet, Take 50 Units by mouth Daily., Disp: , Rfl:   •  Coenzyme Q10 (COQ10 PO), Take  by mouth As Needed., Disp: , Rfl:   •  diclofenac (VOLTAREN) 75 MG EC tablet, Take 1 tablet by mouth 2 (Two) Times a Day As Needed (pain)., Disp: 180 tablet, Rfl: 1  •  ferrous sulfate 325 (65 FE) MG EC tablet, Take 1 tablet by mouth 2 (Two) Times a Day., Disp: 60 tablet, Rfl: 11  •  levothyroxine (SYNTHROID, LEVOTHROID) 150 MCG tablet, Take 150 mcg by mouth daily., Disp: , Rfl:   •  losartan (COZAAR) 25 MG tablet, Take 1 tablet by mouth Daily., Disp: 90 tablet, Rfl: 1  •  omeprazole (priLOSEC) 20 MG capsule, TAKE ONE CAPSULE BY MOUTH TWICE A DAY WITH DICLOFENAC, Disp: 180 capsule, Rfl: 1  •  TURMERIC PO, Take 3,000 mg by mouth Daily., Disp: , Rfl:   •  vitamin B-12 (CYANOCOBALAMIN) 1000 MCG tablet, Take 1,000 mcg by mouth 2 (Two) Times a Day., Disp: , Rfl:   •  vitamin C (ASCORBIC ACID) 500 MG tablet, Take 2,000 mg by mouth Daily., Disp: , Rfl:   •  albuterol (PROVENTIL HFA) 108 (90 Base) MCG/ACT inhaler, Inhale 2 puffs Every 4 (Four) Hours As Needed for Wheezing., Disp: 1 inhaler, Rfl: 2  •  budesonide-formoterol (SYMBICORT) 80-4.5 MCG/ACT inhaler, Inhale 2 puffs 2 (Two) Times a Day., Disp: 1 inhaler, Rfl: 5  •  glucose blood test strip, 1 po bid as directed- One touch ultra Blue 100, Disp: 100  "each, Rfl: 12  •  glucose monitor monitoring kit, 1 each Daily., Disp: 1 each, Rfl: 0  •  Lancets misc, 1 each Daily., Disp: 60 each, Rfl: 11  •  polyethyl glycol-propyl glycol (SYSTANE) 0.4-0.3 % solution ophthalmic solution, 1 drop As Needed., Disp: , Rfl:      Review of Systems   Constitutional: Negative for chills, fatigue and fever.   Respiratory: Negative for cough, chest tightness and shortness of breath.    Cardiovascular: Negative for chest pain.   Gastrointestinal: Negative for abdominal pain, diarrhea, nausea and vomiting.   Endocrine: Negative for cold intolerance and heat intolerance.   Musculoskeletal: Negative for arthralgias.   Neurological: Negative for dizziness.       Objective   Physical Exam   Constitutional: She is oriented to person, place, and time. She appears well-developed and well-nourished.   HENT:   Head: Normocephalic and atraumatic.   Eyes: Conjunctivae and EOM are normal. Pupils are equal, round, and reactive to light.   Neck: Normal range of motion.   Cardiovascular: Normal rate, regular rhythm and normal heart sounds.   Pulmonary/Chest: Effort normal and breath sounds normal.   Abdominal: Soft. Bowel sounds are normal.   Musculoskeletal: Normal range of motion.   Neurological: She is alert and oriented to person, place, and time. She has normal reflexes.   Skin: Skin is warm and dry.   Psychiatric: She has a normal mood and affect. Her behavior is normal. Judgment and thought content normal.     Vitals:    11/15/19 1135   BP: 154/82   Pulse: 66   Resp: 16   Temp: 98.2 °F (36.8 °C)   TempSrc: Temporal   SpO2: 100%   Weight: 101 kg (222 lb 6.4 oz)   Height: 170.2 cm (67.01\")         Assessment/Plan   Livier was seen today for 3 month follow up, diabetes, hypertension, dyslipidemia and history of thyroid cancer.    Diagnoses and all orders for this visit:    Type 2 diabetes mellitus without complication, without long-term current use of insulin (CMS/[facility])  -     POC Glycosylated " Hemoglobin (Hb A1C)  -     Comprehensive Metabolic Panel; Future  -     Lipid Panel; Future  -     TSH; Future  -     Vitamin B12; Future  -     Vitamin D 25 Hydroxy; Future    Essential hypertension  -     Comprehensive Metabolic Panel; Future  -     Lipid Panel; Future  -     TSH; Future  -     Vitamin B12; Future  -     Vitamin D 25 Hydroxy; Future    Dyslipidemia  -     Comprehensive Metabolic Panel; Future  -     Lipid Panel; Future  -     TSH; Future  -     Vitamin B12; Future  -     Vitamin D 25 Hydroxy; Future    Primary osteoarthritis of both knees  -     Comprehensive Metabolic Panel; Future  -     Lipid Panel; Future  -     TSH; Future  -     Vitamin B12; Future  -     Vitamin D 25 Hydroxy; Future    Iron deficiency anemia, unspecified iron deficiency anemia type  -     Comprehensive Metabolic Panel; Future  -     Lipid Panel; Future  -     TSH; Future  -     Vitamin B12; Future  -     Vitamin D 25 Hydroxy; Future    Vitamin D deficiency  -     Comprehensive Metabolic Panel; Future  -     Lipid Panel; Future  -     TSH; Future  -     Vitamin B12; Future  -     Vitamin D 25 Hydroxy; Future    Gastroesophageal reflux disease, esophagitis presence not specified  -     Comprehensive Metabolic Panel; Future  -     Lipid Panel; Future  -     TSH; Future  -     Vitamin B12; Future  -     Vitamin D 25 Hydroxy; Future      A1c is 5.1%, we will hold her Metformin for the next 3 months  She is not fasting, so she will RTC for labs - has anemia lab orders in as well  Flu shot is UTD  No other changes  Return in about 3 months (around 2/15/2020) for Medicare Wellness.

## 2020-02-15 DIAGNOSIS — M54.12 CERVICAL RADICULOPATHY: ICD-10-CM

## 2020-02-15 DIAGNOSIS — M25.511 RIGHT SHOULDER PAIN, UNSPECIFIED CHRONICITY: ICD-10-CM

## 2020-02-15 DIAGNOSIS — I10 ESSENTIAL HYPERTENSION: ICD-10-CM

## 2020-02-17 RX ORDER — DICLOFENAC SODIUM 75 MG/1
TABLET, DELAYED RELEASE ORAL
Qty: 60 TABLET | Refills: 0 | Status: SHIPPED | OUTPATIENT
Start: 2020-02-17 | End: 2020-02-18 | Stop reason: SDUPTHER

## 2020-02-17 RX ORDER — ATENOLOL 25 MG/1
TABLET ORAL
Qty: 30 TABLET | Refills: 0 | Status: SHIPPED | OUTPATIENT
Start: 2020-02-17 | End: 2020-02-18 | Stop reason: SDUPTHER

## 2020-02-18 ENCOUNTER — OFFICE VISIT (OUTPATIENT)
Dept: INTERNAL MEDICINE | Facility: CLINIC | Age: 65
End: 2020-02-18

## 2020-02-18 VITALS
BODY MASS INDEX: 34.47 KG/M2 | RESPIRATION RATE: 16 BRPM | WEIGHT: 219.6 LBS | DIASTOLIC BLOOD PRESSURE: 80 MMHG | SYSTOLIC BLOOD PRESSURE: 138 MMHG | HEART RATE: 66 BPM | TEMPERATURE: 97.6 F | HEIGHT: 67 IN | OXYGEN SATURATION: 99 %

## 2020-02-18 DIAGNOSIS — D50.9 IRON DEFICIENCY ANEMIA, UNSPECIFIED IRON DEFICIENCY ANEMIA TYPE: ICD-10-CM

## 2020-02-18 DIAGNOSIS — K50.919 CROHN'S DISEASE WITH COMPLICATION, UNSPECIFIED GASTROINTESTINAL TRACT LOCATION (HCC): ICD-10-CM

## 2020-02-18 DIAGNOSIS — Z00.00 MEDICARE ANNUAL WELLNESS VISIT, SUBSEQUENT: Primary | ICD-10-CM

## 2020-02-18 DIAGNOSIS — C73 MALIGNANT NEOPLASM OF THYROID GLAND (HCC): ICD-10-CM

## 2020-02-18 DIAGNOSIS — M54.12 CERVICAL RADICULOPATHY: ICD-10-CM

## 2020-02-18 DIAGNOSIS — E11.9 TYPE 2 DIABETES MELLITUS WITHOUT COMPLICATION, WITHOUT LONG-TERM CURRENT USE OF INSULIN (HCC): ICD-10-CM

## 2020-02-18 DIAGNOSIS — E55.9 VITAMIN D DEFICIENCY: ICD-10-CM

## 2020-02-18 DIAGNOSIS — I10 ESSENTIAL HYPERTENSION: ICD-10-CM

## 2020-02-18 DIAGNOSIS — M25.511 RIGHT SHOULDER PAIN, UNSPECIFIED CHRONICITY: ICD-10-CM

## 2020-02-18 DIAGNOSIS — Z00.00 ANNUAL PHYSICAL EXAM: ICD-10-CM

## 2020-02-18 LAB
BILIRUB BLD-MCNC: NEGATIVE MG/DL
CLARITY, POC: CLEAR
COLOR UR: YELLOW
GLUCOSE UR STRIP-MCNC: NEGATIVE MG/DL
HBA1C MFR BLD: 5.9 %
KETONES UR QL: NEGATIVE
LEUKOCYTE EST, POC: ABNORMAL
NITRITE UR-MCNC: NEGATIVE MG/ML
PH UR: 7.5 [PH] (ref 5–8)
PROT UR STRIP-MCNC: NEGATIVE MG/DL
RBC # UR STRIP: NEGATIVE /UL
SP GR UR: 1.01 (ref 1–1.03)
UROBILINOGEN UR QL: NORMAL

## 2020-02-18 PROCEDURE — 81003 URINALYSIS AUTO W/O SCOPE: CPT | Performed by: NURSE PRACTITIONER

## 2020-02-18 PROCEDURE — 96160 PT-FOCUSED HLTH RISK ASSMT: CPT | Performed by: NURSE PRACTITIONER

## 2020-02-18 PROCEDURE — 99397 PER PM REEVAL EST PAT 65+ YR: CPT | Performed by: NURSE PRACTITIONER

## 2020-02-18 PROCEDURE — G0439 PPPS, SUBSEQ VISIT: HCPCS | Performed by: NURSE PRACTITIONER

## 2020-02-18 PROCEDURE — 83036 HEMOGLOBIN GLYCOSYLATED A1C: CPT | Performed by: NURSE PRACTITIONER

## 2020-02-18 RX ORDER — DICLOFENAC SODIUM 75 MG/1
75 TABLET, DELAYED RELEASE ORAL 2 TIMES DAILY PRN
Qty: 180 TABLET | Refills: 1 | Status: SHIPPED | OUTPATIENT
Start: 2020-02-18 | End: 2020-09-01 | Stop reason: HOSPADM

## 2020-02-18 RX ORDER — ATENOLOL 25 MG/1
25 TABLET ORAL DAILY
Qty: 90 TABLET | Refills: 1 | Status: SHIPPED | OUTPATIENT
Start: 2020-02-18 | End: 2020-09-17

## 2020-02-18 NOTE — PROGRESS NOTES
The ABCs of the Annual Wellness Visit  Subsequent Medicare Wellness Visit    Chief Complaint   Patient presents with   • Medicare Wellness-subsequent       Subjective   History of Present Illness:  Livier Gutierrez is a 64 y.o. female who presents for a Subsequent Medicare Wellness Visit.    HEALTH RISK ASSESSMENT    Recent Hospitalizations:  No hospitalization(s) within the last year.    Current Medical Providers:  Patient Care Team:  Tracey Smith APRN as PCP - General (Family Medicine)    Smoking Status:  Social History     Tobacco Use   Smoking Status Former Smoker   Smokeless Tobacco Never Used   Tobacco Comment    quit about 32 years ago       Alcohol Consumption:  Social History     Substance and Sexual Activity   Alcohol Use No       Depression Screen:   PHQ-2/PHQ-9 Depression Screening 2/18/2020   Little interest or pleasure in doing things 0   Feeling down, depressed, or hopeless 0   Total Score 0       Fall Risk Screen:  STEADI Fall Risk Assessment has not been completed.    Health Habits and Functional and Cognitive Screening:  Functional & Cognitive Status 2/18/2020   Do you have difficulty preparing food and eating? No   Do you have difficulty bathing yourself, getting dressed or grooming yourself? No   Do you have difficulty using the toilet? No   Do you have difficulty moving around from place to place? No   Do you have trouble with steps or getting out of a bed or a chair? No   Current Diet Well Balanced Diet   Dental Exam Up to date   Eye Exam Up to date   Exercise (times per week) 0 times per week   Current Exercise Activities Include No Regular Exercise   Do you need help using the phone?  No   Are you deaf or do you have serious difficulty hearing?  No   Do you need help with transportation? No   Do you need help shopping? No   Do you need help preparing meals?  No   Do you need help with housework?  No   Do you need help with laundry? No   Do you need help taking your medications? No   Do  you need help managing money? No   Do you ever drive or ride in a car without wearing a seat belt? No   Have you felt unusual stress, anger or loneliness in the last month? Yes   Who do you live with? Alone   If you need help, do you have trouble finding someone available to you? No   Have you been bothered in the last four weeks by sexual problems? No   Do you have difficulty concentrating, remembering or making decisions? No         Does the patient have evidence of cognitive impairment? No    Asprin use counseling:Does not need ASA (and currently is not on it)    Age-appropriate Screening Schedule:  Refer to the list below for future screening recommendations based on patient's age, sex and/or medical conditions. Orders for these recommended tests are listed in the plan section. The patient has been provided with a written plan.    Health Maintenance   Topic Date Due   • DIABETIC FOOT EXAM  06/01/2020   • LIPID PANEL  08/01/2020   • HEMOGLOBIN A1C  08/18/2020   • DIABETIC EYE EXAM  01/01/2021   • URINE MICROALBUMIN  02/18/2021   • DXA SCAN  03/06/2021   • MAMMOGRAM  12/23/2021   • TDAP/TD VACCINES (2 - Td) 01/01/2022   • COLONOSCOPY  04/01/2026   • INFLUENZA VACCINE  Completed   • ZOSTER VACCINE  Discontinued          The following portions of the patient's history were reviewed and updated as appropriate: allergies, current medications, past family history, past medical history, past social history, past surgical history and problem list.    Outpatient Medications Prior to Visit   Medication Sig Dispense Refill   • albuterol (PROVENTIL HFA) 108 (90 Base) MCG/ACT inhaler Inhale 2 puffs Every 4 (Four) Hours As Needed for Wheezing. 1 inhaler 2   • BLACK CURRANT SEED OIL PO Take  by mouth 2 (Two) Times a Day.     • budesonide-formoterol (SYMBICORT) 80-4.5 MCG/ACT inhaler Inhale 2 puffs 2 (Two) Times a Day. 1 inhaler 5   • cholecalciferol (VITAMIN D3) 400 units tablet Take 50 Units by mouth Daily.     • Coenzyme Q10  (COQ10 PO) Take  by mouth As Needed.     • ferrous sulfate 325 (65 FE) MG EC tablet Take 1 tablet by mouth 2 (Two) Times a Day. 60 tablet 11   • levothyroxine (SYNTHROID, LEVOTHROID) 150 MCG tablet Take 150 mcg by mouth daily.     • losartan (COZAAR) 25 MG tablet Take 1 tablet by mouth Daily. 90 tablet 1   • omeprazole (priLOSEC) 20 MG capsule TAKE ONE CAPSULE BY MOUTH TWICE A DAY WITH DICLOFENAC 180 capsule 1   • TURMERIC PO Take 3,000 mg by mouth Daily.     • vitamin B-12 (CYANOCOBALAMIN) 1000 MCG tablet Take 1,000 mcg by mouth 2 (Two) Times a Day.     • vitamin C (ASCORBIC ACID) 500 MG tablet Take 2,000 mg by mouth Daily.     • atenolol (TENORMIN) 25 MG tablet TAKE ONE TABLET BY MOUTH DAILY 30 tablet 0   • diclofenac (VOLTAREN) 75 MG EC tablet TAKE ONE TABLET BY MOUTH TWICE A DAY AS NEEDED FOR PAIN 60 tablet 0   • glucose blood test strip 1 po bid as directed- One touch ultra Blue 100 100 each 12   • glucose monitor monitoring kit 1 each Daily. 1 each 0   • Lancets misc 1 each Daily. 60 each 11   • polyethyl glycol-propyl glycol (SYSTANE) 0.4-0.3 % solution ophthalmic solution 1 drop As Needed.       No facility-administered medications prior to visit.        Patient Active Problem List   Diagnosis   • History of thyroid cancer   • Dyslipidemia   • Hypertension   • Allergic   • Joint pain   • Malignant neoplasm of thyroid gland (CMS/HCC)   • Allergic rhinitis   • Regional enteritis (CMS/HCC)   • Cellulitis   • Exertional dyspnea   • Skin lesion   • Primary osteoarthritis of both knees   • Abnormal EKG       Advanced Care Planning:  ACP discussion was held with the patient during this visit.    Pt was found to be slightly anemic after labs at last visit.  Iron studies were sent and she was started on Ferrous Sulfate.  We will repeat labs today.      Calcium was also slightly low on labs in august 2019.  PTH and iCa were WNL     DM II - does not ck FS @ home; currently on 500mg of Metformin daily; A1C today  is 5.9%; Metformin was held since her last visit.     Has seen podiatry in the past for BLE N/T.  An EMG of her LE's was ordered for BLE pain, N/T.  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  She was started on Gabapentin, but she did not tolerate, so she stopped this.  States that sx's are stable/improved since her knee replacements.        HTN - chronic and stable on Cozaar, Atenolol and HCTZ.  Denies medication SE's.  She has not had her BP meds this AM.         HL - has been off of her pravastatin for a while; made her feel terrible - she is fasting for labs      H/O thyroid CA - managed by oncologist at  currently has her on 150mcg; Let her go to the [facility]. She is now seeing her every 6 months.        GERD is chronic and stable on Omeprazole, but only takes it PRN      Allergies are doing ok. Taking Claritin everyday, but likes to have an albuterol inhaler on hand for wheezing/SOA.       Bilateral knee pain - Livier underwent bilateral knee replacements since her last visit per Dr. Bruno; right 4/29/19 and left 3/12/2019      Review of Systems   Constitutional: Negative for appetite change, chills, fatigue, fever and unexpected weight change.   HENT: Negative for congestion, ear pain, nosebleeds, rhinorrhea and tinnitus.    Eyes: Negative for pain.   Respiratory: Negative for cough, chest tightness and shortness of breath.    Cardiovascular: Negative for chest pain, palpitations and leg swelling.   Gastrointestinal: Negative for abdominal distention, abdominal pain, blood in stool, constipation, diarrhea, nausea and vomiting.   Endocrine: Negative for cold intolerance, heat intolerance, polydipsia, polyphagia and polyuria.   Genitourinary: Negative for dysuria, flank pain, frequency, hematuria and urgency.   Musculoskeletal: Negative for arthralgias, back pain, gait problem, joint swelling, myalgias and neck pain.   Skin: Negative for color change, pallor, rash and wound.  "  Allergic/Immunologic: Negative for environmental allergies and food allergies.   Neurological: Negative for dizziness, syncope, weakness, light-headedness, numbness and headaches.   Hematological: Negative for adenopathy. Does not bruise/bleed easily.   Psychiatric/Behavioral: Negative for behavioral problems and suicidal ideas. The patient is not nervous/anxious.        Compared to one year ago, the patient feels her physical health is better.  Compared to one year ago, the patient feels her mental health is the same.    Reviewed chart for potential of high risk medication in the elderly: yes  Reviewed chart for potential of harmful drug interactions in the elderly:yes    Objective         Vitals:    02/18/20 0948   BP: 138/80   Pulse: 66   Resp: 16   Temp: 97.6 °F (36.4 °C)   TempSrc: Temporal   SpO2: 99%   Weight: 99.6 kg (219 lb 9.6 oz)   Height: 170.2 cm (67.01\")   PainSc: 0-No pain       Body mass index is 34.39 kg/m².  Discussed the patient's BMI with her. The BMI is above average; BMI management plan is completed.    Physical Exam   Constitutional: She is oriented to person, place, and time. She appears well-developed and well-nourished.   HENT:   Head: Normocephalic and atraumatic.   Right Ear: External ear normal.   Left Ear: External ear normal.   Nose: Nose normal.   Mouth/Throat: Oropharynx is clear and moist.   Eyes: Pupils are equal, round, and reactive to light. Conjunctivae and EOM are normal.   Neck: Normal range of motion. Neck supple.   Cardiovascular: Normal rate, regular rhythm, normal heart sounds and intact distal pulses.   Pulses:       Carotid pulses are 2+ on the right side, and 2+ on the left side.  Pulmonary/Chest: Effort normal and breath sounds normal. Right breast exhibits no inverted nipple, no mass, no nipple discharge, no skin change and no tenderness. Left breast exhibits no inverted nipple, no mass, no nipple discharge, no skin change and no tenderness. No breast swelling, " tenderness, discharge or bleeding. Breasts are symmetrical.   Abdominal: Soft. Normal appearance, normal aorta and bowel sounds are normal.   Genitourinary: No breast swelling, tenderness, discharge or bleeding.   Musculoskeletal: Normal range of motion.   Lymphadenopathy:        Head (right side): No tonsillar adenopathy present.        Head (left side): No tonsillar adenopathy present.        Right cervical: No superficial cervical and no posterior cervical adenopathy present.       Left cervical: No superficial cervical and no posterior cervical adenopathy present.   Neurological: She is alert and oriented to person, place, and time. She has normal strength and normal reflexes. She displays a negative Romberg sign.   Skin: Skin is warm, dry and intact.   Psychiatric: She has a normal mood and affect. Her behavior is normal. Judgment and thought content normal.   Vitals reviewed.        Lab Results   Component Value Date    HGBA1C 5.9 02/18/2020        Assessment/Plan   Medicare Risks and Personalized Health Plan  CMS Preventative Services Quick Reference  Inactivity/Sedentary  Obesity/Overweight     Flu shot - 10/4/2019  Pneumovax 10/13, 2/1/2019  Zostavax, declines  Prevnar - 4/19/16  Tdap 2012   DEXA - 3/6/2019  Ovarian Ca screening; [facility]   Mamm 12/2019  Pap - 7/2014; no longer doing ([facility])  Colon 2016 ([facility]); repeating in 1 year this time, he found some mild crohns - she called is office and since she is sx free, he is going to hold off on a colon for now.    Eye exam - 1/2020 at   Foot exam 1/2020 per podiatry - goes Q8-9 weeks for nail trim and ck    The above risks/problems have been discussed with the patient.  Pertinent information has been shared with the patient in the After Visit Summary.  Follow up plans and orders are seen below in the Assessment/Plan Section.    Diagnoses and all orders for this visit:    1. Medicare annual wellness visit, subsequent (Primary)  -     POC Urinalysis Dipstick,  Automated  -     CBC & Differential  -     Iron Profile  -     Ferritin  -     Folate  -     Comprehensive Metabolic Panel  -     Lipid Panel  -     TSH  -     Vitamin B12  -     Vitamin D 25 Hydroxy    2. Annual physical exam  -     POC Urinalysis Dipstick, Automated  -     CBC & Differential  -     Iron Profile  -     Ferritin  -     Folate  -     Comprehensive Metabolic Panel  -     Lipid Panel  -     TSH  -     Vitamin B12  -     Vitamin D 25 Hydroxy    3. Type 2 diabetes mellitus without complication, without long-term current use of insulin (CMS/HCC)  -     POC Glycosylated Hemoglobin (Hb A1C)  -     CBC & Differential  -     Iron Profile  -     Ferritin  -     Folate  -     Comprehensive Metabolic Panel  -     Lipid Panel  -     TSH  -     Vitamin B12  -     Vitamin D 25 Hydroxy    4. Essential hypertension  -     atenolol (TENORMIN) 25 MG tablet; Take 1 tablet by mouth Daily.  Dispense: 90 tablet; Refill: 1  -     CBC & Differential  -     Iron Profile  -     Ferritin  -     Folate  -     Comprehensive Metabolic Panel  -     Lipid Panel  -     TSH  -     Vitamin B12  -     Vitamin D 25 Hydroxy    5. Cervical radiculopathy  -     diclofenac (VOLTAREN) 75 MG EC tablet; Take 1 tablet by mouth 2 (Two) Times a Day As Needed (pain).  Dispense: 180 tablet; Refill: 1  -     CBC & Differential  -     Iron Profile  -     Ferritin  -     Folate  -     Comprehensive Metabolic Panel  -     Lipid Panel  -     TSH  -     Vitamin B12  -     Vitamin D 25 Hydroxy    6. Right shoulder pain, unspecified chronicity  -     diclofenac (VOLTAREN) 75 MG EC tablet; Take 1 tablet by mouth 2 (Two) Times a Day As Needed (pain).  Dispense: 180 tablet; Refill: 1  -     CBC & Differential  -     Iron Profile  -     Ferritin  -     Folate  -     Comprehensive Metabolic Panel  -     Lipid Panel  -     TSH  -     Vitamin B12  -     Vitamin D 25 Hydroxy    7. Malignant neoplasm of thyroid gland (CMS/HCC)  -     CBC & Differential  -     Iron  Profile  -     Ferritin  -     Folate  -     Comprehensive Metabolic Panel  -     Lipid Panel  -     TSH  -     Vitamin B12  -     Vitamin D 25 Hydroxy    8. Crohn's disease with complication, unspecified gastrointestinal tract location (CMS/HCC)  -     CBC & Differential  -     Iron Profile  -     Ferritin  -     Folate  -     Comprehensive Metabolic Panel  -     Lipid Panel  -     TSH  -     Vitamin B12  -     Vitamin D 25 Hydroxy    9. Iron deficiency anemia, unspecified iron deficiency anemia type  -     CBC & Differential  -     Iron Profile  -     Ferritin  -     Folate  -     Comprehensive Metabolic Panel  -     Lipid Panel  -     TSH  -     Vitamin B12  -     Vitamin D 25 Hydroxy    10. Vitamin D deficiency  -     CBC & Differential  -     Iron Profile  -     Ferritin  -     Folate  -     Comprehensive Metabolic Panel  -     Lipid Panel  -     TSH  -     Vitamin B12  -     Vitamin D 25 Hydroxy      Counseling - diet and exercise    Follow Up:  Return in about 3 months (around 5/18/2020).     An After Visit Summary and PPPS were given to the patient.

## 2020-02-18 NOTE — PATIENT INSTRUCTIONS
Diabetes Mellitus and Nutrition, Adult  When you have diabetes (diabetes mellitus), it is very important to have healthy eating habits because your blood sugar (glucose) levels are greatly affected by what you eat and drink. Eating healthy foods in the appropriate amounts, at about the same times every day, can help you:  · Control your blood glucose.  · Lower your risk of heart disease.  · Improve your blood pressure.  · Reach or maintain a healthy weight.  Every person with diabetes is different, and each person has different needs for a meal plan. Your health care provider may recommend that you work with a diet and nutrition specialist (dietitian) to make a meal plan that is best for you. Your meal plan may vary depending on factors such as:  · The calories you need.  · The medicines you take.  · Your weight.  · Your blood glucose, blood pressure, and cholesterol levels.  · Your activity level.  · Other health conditions you have, such as heart or kidney disease.  How do carbohydrates affect me?  Carbohydrates, also called carbs, affect your blood glucose level more than any other type of food. Eating carbs naturally raises the amount of glucose in your blood. Carb counting is a method for keeping track of how many carbs you eat. Counting carbs is important to keep your blood glucose at a healthy level, especially if you use insulin or take certain oral diabetes medicines.  It is important to know how many carbs you can safely have in each meal. This is different for every person. Your dietitian can help you calculate how many carbs you should have at each meal and for each snack.  Foods that contain carbs include:  · Bread, cereal, rice, pasta, and crackers.  · Potatoes and corn.  · Peas, beans, and lentils.  · Milk and yogurt.  · Fruit and juice.  · Desserts, such as cakes, cookies, ice cream, and candy.  How does alcohol affect me?  Alcohol can cause a sudden decrease in blood glucose (hypoglycemia),  "especially if you use insulin or take certain oral diabetes medicines. Hypoglycemia can be a life-threatening condition. Symptoms of hypoglycemia (sleepiness, dizziness, and confusion) are similar to symptoms of having too much alcohol.  If your health care provider says that alcohol is safe for you, follow these guidelines:  · Limit alcohol intake to no more than 1 drink per day for nonpregnant women and 2 drinks per day for men. One drink equals 12 oz of beer, 5 oz of wine, or 1½ oz of hard liquor.  · Do not drink on an empty stomach.  · Keep yourself hydrated with water, diet soda, or unsweetened iced tea.  · Keep in mind that regular soda, juice, and other mixers may contain a lot of sugar and must be counted as carbs.  What are tips for following this plan?    Reading food labels  · Start by checking the serving size on the \"Nutrition Facts\" label of packaged foods and drinks. The amount of calories, carbs, fats, and other nutrients listed on the label is based on one serving of the item. Many items contain more than one serving per package.  · Check the total grams (g) of carbs in one serving. You can calculate the number of servings of carbs in one serving by dividing the total carbs by 15. For example, if a food has 30 g of total carbs, it would be equal to 2 servings of carbs.  · Check the number of grams (g) of saturated and trans fats in one serving. Choose foods that have low or no amount of these fats.  · Check the number of milligrams (mg) of salt (sodium) in one serving. Most people should limit total sodium intake to less than 2,300 mg per day.  · Always check the nutrition information of foods labeled as \"low-fat\" or \"nonfat\". These foods may be higher in added sugar or refined carbs and should be avoided.  · Talk to your dietitian to identify your daily goals for nutrients listed on the label.  Shopping  · Avoid buying canned, premade, or processed foods. These foods tend to be high in fat, sodium, " and added sugar.  · Shop around the outside edge of the grocery store. This includes fresh fruits and vegetables, bulk grains, fresh meats, and fresh dairy.  Cooking  · Use low-heat cooking methods, such as baking, instead of high-heat cooking methods like deep frying.  · Cook using healthy oils, such as olive, canola, or sunflower oil.  · Avoid cooking with butter, cream, or high-fat meats.  Meal planning  · Eat meals and snacks regularly, preferably at the same times every day. Avoid going long periods of time without eating.  · Eat foods high in fiber, such as fresh fruits, vegetables, beans, and whole grains. Talk to your dietitian about how many servings of carbs you can eat at each meal.  · Eat 4-6 ounces (oz) of lean protein each day, such as lean meat, chicken, fish, eggs, or tofu. One oz of lean protein is equal to:  ? 1 oz of meat, chicken, or fish.  ? 1 egg.  ? ¼ cup of tofu.  · Eat some foods each day that contain healthy fats, such as avocado, nuts, seeds, and fish.  Lifestyle  · Check your blood glucose regularly.  · Exercise regularly as told by your health care provider. This may include:  ? 150 minutes of moderate-intensity or vigorous-intensity exercise each week. This could be brisk walking, biking, or water aerobics.  ? Stretching and doing strength exercises, such as yoga or weightlifting, at least 2 times a week.  · Take medicines as told by your health care provider.  · Do not use any products that contain nicotine or tobacco, such as cigarettes and e-cigarettes. If you need help quitting, ask your health care provider.  · Work with a counselor or diabetes educator to identify strategies to manage stress and any emotional and social challenges.  Questions to ask a health care provider  · Do I need to meet with a diabetes educator?  · Do I need to meet with a dietitian?  · What number can I call if I have questions?  · When are the best times to check my blood glucose?  Where to find more  information:  · American Diabetes Association: diabetes.org  · Academy of Nutrition and Dietetics: www.eatright.org  · National [place] of Diabetes and Digestive and Kidney Diseases (NIH): www.niddk.nih.gov  Summary  · A healthy meal plan will help you control your blood glucose and maintain a healthy lifestyle.  · Working with a diet and nutrition specialist (dietitian) can help you make a meal plan that is best for you.  · Keep in mind that carbohydrates (carbs) and alcohol have immediate effects on your blood glucose levels. It is important to count carbs and to use alcohol carefully.  This information is not intended to replace advice given to you by your health care provider. Make sure you discuss any questions you have with your health care provider.  Document Released: 09/14/2006 Document Revised: 07/18/2018 Document Reviewed: 01/22/2018  [employer] Interactive Patient Education © 2019 [employer]  Diabetes Mellitus and Exercise  Exercising regularly is important for your overall health, especially when you have diabetes (diabetes mellitus). Exercising is not only about losing weight. It has many other health benefits, such as increasing muscle strength and bone density and reducing body fat and stress. This leads to improved fitness, flexibility, and endurance, all of which result in better overall health.  Exercise has additional benefits for people with diabetes, including:  · Reducing appetite.  · Helping to lower and control blood glucose.  · Lowering blood pressure.  · Helping to control amounts of fatty substances (lipids) in the blood, such as cholesterol and triglycerides.  · Helping the body to respond better to insulin (improving insulin sensitivity).  · Reducing how much insulin the body needs.  · Decreasing the risk for heart disease by:  ? Lowering cholesterol and triglyceride levels.  ? Increasing the levels of good cholesterol.  ? Lowering blood glucose levels.  What is my activity  plan?  Your health care provider or certified diabetes educator can help you make a plan for the type and frequency of exercise (activity plan) that works for you. Make sure that you:  · Do at least 150 minutes of moderate-intensity or vigorous-intensity exercise each week. This could be brisk walking, biking, or water aerobics.  ? Do stretching and strength exercises, such as yoga or weightlifting, at least 2 times a week.  ? Spread out your activity over at least 3 days of the week.  · Get some form of physical activity every day.  ? Do not go more than 2 days in a row without some kind of physical activity.  ? Avoid being inactive for more than 30 minutes at a time. Take frequent breaks to walk or stretch.  · Choose a type of exercise or activity that you enjoy, and set realistic goals.  · Start slowly, and gradually increase the intensity of your exercise over time.  What do I need to know about managing my diabetes?    · Check your blood glucose before and after exercising.  ? If your blood glucose is 240 mg/dL (13.3 mmol/L) or higher before you exercise, check your urine for ketones. If you have ketones in your urine, do not exercise until your blood glucose returns to normal.  ? If your blood glucose is 100 mg/dL (5.6 mmol/L) or lower, eat a snack containing 15-20 grams of carbohydrate. Check your blood glucose 15 minutes after the snack to make sure that your level is above 100 mg/dL (5.6 mmol/L) before you start your exercise.  · Know the symptoms of low blood glucose (hypoglycemia) and how to treat it. Your risk for hypoglycemia increases during and after exercise. Common symptoms of hypoglycemia can include:  ? Hunger.  ? Anxiety.  ? Sweating and feeling clammy.  ? Confusion.  ? Dizziness or feeling light-headed.  ? Increased heart rate or palpitations.  ? Blurry vision.  ? Tingling or numbness around the mouth, lips, or tongue.  ? Tremors or shakes.  ? Irritability.  · Keep a rapid-acting carbohydrate  snack available before, during, and after exercise to help prevent or treat hypoglycemia.  · Avoid injecting insulin into areas of the body that are going to be exercised. For example, avoid injecting insulin into:  ? The arms, when playing tennis.  ? The legs, when jogging.  · Keep records of your exercise habits. Doing this can help you and your health care provider adjust your diabetes management plan as needed. Write down:  ? Food that you eat before and after you exercise.  ? Blood glucose levels before and after you exercise.  ? The type and amount of exercise you have done.  ? When your insulin is expected to peak, if you use insulin. Avoid exercising at times when your insulin is peaking.  · When you start a new exercise or activity, work with your health care provider to make sure the activity is safe for you, and to adjust your insulin, medicines, or food intake as needed.  · Drink plenty of water while you exercise to prevent dehydration or heat stroke. Drink enough fluid to keep your urine clear or pale yellow.  Summary  · Exercising regularly is important for your overall health, especially when you have diabetes (diabetes mellitus).  · Exercising has many health benefits, such as increasing muscle strength and bone density and reducing body fat and stress.  · Your health care provider or certified diabetes educator can help you make a plan for the type and frequency of exercise (activity plan) that works for you.  · When you start a new exercise or activity, work with your health care provider to make sure the activity is safe for you, and to adjust your insulin, medicines, or food intake as needed.  This information is not intended to replace advice given to you by your health care provider. Make sure you discuss any questions you have with your health care provider.  Document Released: 03/09/2005 Document Revised: 06/28/2018 Document Reviewed: 05/29/2017  [employer] Interactive Patient Education © 2019  [employer]

## 2020-02-19 LAB
25(OH)D3+25(OH)D2 SERPL-MCNC: 47.2 NG/ML (ref 30–100)
ALBUMIN SERPL-MCNC: 3.7 G/DL (ref 3.5–5.2)
ALBUMIN/GLOB SERPL: 1.2 G/DL
ALP SERPL-CCNC: 44 U/L (ref 39–117)
ALT SERPL-CCNC: 11 U/L (ref 1–33)
AST SERPL-CCNC: 14 U/L (ref 1–32)
BASOPHILS # BLD AUTO: 0.06 10*3/MM3 (ref 0–0.2)
BASOPHILS NFR BLD AUTO: 0.8 % (ref 0–1.5)
BILIRUB SERPL-MCNC: 0.4 MG/DL (ref 0.2–1.2)
BUN SERPL-MCNC: 15 MG/DL (ref 8–23)
BUN/CREAT SERPL: 16.1 (ref 7–25)
CALCIUM SERPL-MCNC: 8.4 MG/DL (ref 8.6–10.5)
CHLORIDE SERPL-SCNC: 103 MMOL/L (ref 98–107)
CHOLEST SERPL-MCNC: 175 MG/DL (ref 0–200)
CO2 SERPL-SCNC: 26.4 MMOL/L (ref 22–29)
CREAT SERPL-MCNC: 0.93 MG/DL (ref 0.57–1)
EOSINOPHIL # BLD AUTO: 0.19 10*3/MM3 (ref 0–0.4)
EOSINOPHIL NFR BLD AUTO: 2.6 % (ref 0.3–6.2)
ERYTHROCYTE [DISTWIDTH] IN BLOOD BY AUTOMATED COUNT: 17.3 % (ref 12.3–15.4)
FERRITIN SERPL-MCNC: 20.8 NG/ML (ref 13–150)
FOLATE SERPL-MCNC: >20 NG/ML (ref 4.78–24.2)
GLOBULIN SER CALC-MCNC: 3.1 GM/DL
GLUCOSE SERPL-MCNC: 83 MG/DL (ref 65–99)
HCT VFR BLD AUTO: 36.5 % (ref 34–46.6)
HDLC SERPL-MCNC: 41 MG/DL (ref 40–60)
HGB BLD-MCNC: 11.3 G/DL (ref 12–15.9)
IMM GRANULOCYTES # BLD AUTO: 0.03 10*3/MM3 (ref 0–0.05)
IMM GRANULOCYTES NFR BLD AUTO: 0.4 % (ref 0–0.5)
IRON SATN MFR SERPL: 15 % (ref 20–50)
IRON SERPL-MCNC: 45 MCG/DL (ref 37–145)
LDLC SERPL CALC-MCNC: 112 MG/DL (ref 0–100)
LYMPHOCYTES # BLD AUTO: 1.66 10*3/MM3 (ref 0.7–3.1)
LYMPHOCYTES NFR BLD AUTO: 22.5 % (ref 19.6–45.3)
MCH RBC QN AUTO: 23.5 PG (ref 26.6–33)
MCHC RBC AUTO-ENTMCNC: 31 G/DL (ref 31.5–35.7)
MCV RBC AUTO: 75.9 FL (ref 79–97)
MONOCYTES # BLD AUTO: 0.63 10*3/MM3 (ref 0.1–0.9)
MONOCYTES NFR BLD AUTO: 8.5 % (ref 5–12)
NEUTROPHILS # BLD AUTO: 4.81 10*3/MM3 (ref 1.7–7)
NEUTROPHILS NFR BLD AUTO: 65.2 % (ref 42.7–76)
NRBC BLD AUTO-RTO: 0 /100 WBC (ref 0–0.2)
PLATELET # BLD AUTO: 334 10*3/MM3 (ref 140–450)
POTASSIUM SERPL-SCNC: 4.2 MMOL/L (ref 3.5–5.2)
PROT SERPL-MCNC: 6.8 G/DL (ref 6–8.5)
RBC # BLD AUTO: 4.81 10*6/MM3 (ref 3.77–5.28)
SODIUM SERPL-SCNC: 140 MMOL/L (ref 136–145)
TIBC SERPL-MCNC: 291 MCG/DL
TRIGL SERPL-MCNC: 112 MG/DL (ref 0–150)
TSH SERPL DL<=0.005 MIU/L-ACNC: 1.31 UIU/ML (ref 0.27–4.2)
UIBC SERPL-MCNC: 246 MCG/DL (ref 112–346)
VIT B12 SERPL-MCNC: >2000 PG/ML (ref 211–946)
VLDLC SERPL CALC-MCNC: 22.4 MG/DL
WBC # BLD AUTO: 7.38 10*3/MM3 (ref 3.4–10.8)

## 2020-02-27 RX ORDER — LOVASTATIN 20 MG/1
20 TABLET ORAL NIGHTLY
Qty: 90 TABLET | Refills: 1 | Status: SHIPPED | OUTPATIENT
Start: 2020-02-27 | End: 2020-05-19 | Stop reason: SDUPTHER

## 2020-03-03 ENCOUNTER — TELEPHONE (OUTPATIENT)
Dept: INTERNAL MEDICINE | Facility: CLINIC | Age: 65
End: 2020-03-03

## 2020-03-03 NOTE — TELEPHONE ENCOUNTER
PATIENT CALLED STATING THAT SHE WAS KEIRSTEN BACK. PLEASE ADVISE.     PATIENT CALL BACK [phone]

## 2020-03-03 NOTE — TELEPHONE ENCOUNTER
----- Message from GUSTAVO Marcelino sent at 2/27/2020  9:46 AM EST -----  Iron labs/anemia is improved, but still slightly low.  Is she taking her iron consistently/how often?    Cholesterol has gone up and is too high for someone with DM.  I have sent in Lovastatin to try    All other labs were within acceptable limits.

## 2020-03-11 NOTE — TELEPHONE ENCOUNTER
Livier has been notified. She has been taking her iron every other day. She said she will start taking this every day and will  the Lovastatin and start on that as well.

## 2020-04-01 RX ORDER — LOSARTAN POTASSIUM 25 MG/1
TABLET ORAL
Qty: 90 TABLET | Refills: 1 | Status: SHIPPED | OUTPATIENT
Start: 2020-04-01 | End: 2020-09-17

## 2020-05-19 ENCOUNTER — OFFICE VISIT (OUTPATIENT)
Dept: INTERNAL MEDICINE | Facility: CLINIC | Age: 65
End: 2020-05-19

## 2020-05-19 VITALS
DIASTOLIC BLOOD PRESSURE: 84 MMHG | SYSTOLIC BLOOD PRESSURE: 134 MMHG | OXYGEN SATURATION: 98 % | HEART RATE: 70 BPM | BODY MASS INDEX: 35.91 KG/M2 | RESPIRATION RATE: 16 BRPM | WEIGHT: 228.8 LBS | HEIGHT: 67 IN | TEMPERATURE: 97.6 F

## 2020-05-19 DIAGNOSIS — E78.5 DYSLIPIDEMIA: ICD-10-CM

## 2020-05-19 DIAGNOSIS — M25.561 ARTHRALGIA OF BOTH KNEES: ICD-10-CM

## 2020-05-19 DIAGNOSIS — K21.9 GASTROESOPHAGEAL REFLUX DISEASE, ESOPHAGITIS PRESENCE NOT SPECIFIED: ICD-10-CM

## 2020-05-19 DIAGNOSIS — E66.01 MORBIDLY OBESE (HCC): ICD-10-CM

## 2020-05-19 DIAGNOSIS — E11.9 TYPE 2 DIABETES MELLITUS WITHOUT COMPLICATION, WITHOUT LONG-TERM CURRENT USE OF INSULIN (HCC): Primary | ICD-10-CM

## 2020-05-19 DIAGNOSIS — M25.562 ARTHRALGIA OF BOTH KNEES: ICD-10-CM

## 2020-05-19 DIAGNOSIS — I10 ESSENTIAL HYPERTENSION: ICD-10-CM

## 2020-05-19 DIAGNOSIS — C73 MALIGNANT NEOPLASM OF THYROID GLAND (HCC): ICD-10-CM

## 2020-05-19 LAB — HBA1C MFR BLD: 5.8 %

## 2020-05-19 PROCEDURE — 99214 OFFICE O/P EST MOD 30 MIN: CPT | Performed by: NURSE PRACTITIONER

## 2020-05-19 PROCEDURE — 83036 HEMOGLOBIN GLYCOSYLATED A1C: CPT | Performed by: NURSE PRACTITIONER

## 2020-05-19 RX ORDER — OMEPRAZOLE 20 MG/1
CAPSULE, DELAYED RELEASE ORAL
Qty: 180 CAPSULE | Refills: 1 | Status: SHIPPED | OUTPATIENT
Start: 2020-05-19 | End: 2021-04-12

## 2020-05-19 RX ORDER — LOVASTATIN 20 MG/1
20 TABLET ORAL NIGHTLY
Qty: 90 TABLET | Refills: 1 | Status: SHIPPED | OUTPATIENT
Start: 2020-05-19 | End: 2020-11-24

## 2020-05-19 NOTE — PROGRESS NOTES
Subjective   Livier Gutierrez is a 64 y.o. female    Chief Complaint   Patient presents with   • Follow-up   • Diabetes   • Hypertension   • Hyperlipidemia     Never started the Lovastatin due to price.   • Heartburn     History of Present Illness     Here for f/u     Pt was found to be slightly anemic after labs about 6 months ago.  Iron studies were sent and she was started on Ferrous Sulfate.  Repeat labs in Feb showed improvement, but still slightly anemic.        Calcium was also slightly low on labs in august 2019.  PTH and iCa were WNL     DM II - does not ck FS @ home; Has been off of Metformin for 6+ months now.  A1C today is 5.8%.     Has seen podiatry in the past for BLE N/T.  An EMG of her LE's was ordered for BLE pain, N/T.  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  She was started on Gabapentin, but she did not tolerate, so she stopped this.  States that sx's are stable/improved since her knee replacements.        HTN - chronic and stable on Cozaar, Atenolol and HCTZ.  Denies medication SE's.  BP is a little high this AM, but she states that she has already been running around to several appts this AM.        HL - has been off of her pravastatin for a while; made her feel terrible - she was supposed to start Lovastatin after her last labs, but states that she did not do so.  She thinks that it may have been too expensive.        H/O thyroid CA - managed by oncologist at  currently has her on 150mcg; Let her go to the [facility]. She is now seeing her every 6 months.        GERD is chronic and stable on Omeprazole, but only takes it PRN      Allergies are doing ok. Taking Claritin everyday, but likes to have an albuterol inhaler on hand for wheezing/SOA.       Bilateral knee pain - Livier underwent bilateral knee replacements since her last visit per Dr. Bruno; right 4/29/19 and left 3/12/2019    Pt has been helping out with her sister a great deal who recently had a CVA.  She is  doing a lot of lifting, pulling and tugging that she is not used to.  She is having BUE pain with some N/T at times.  Mostly in the biceps, but does radiate down to her hands.  She states that she has previously been dx'd with CTS and has splints, but she is not wearing them.  She declines work up at this time, but just wanted to make note in case sx's worsened.    Flu shot - 10/4/2019  Pneumovax 10/13, 2/1/2019  Zostavax, declines  Prevnar - 4/19/16  Tdap 2012   DEXA - 3/6/2019  Ovarian Ca screening; [facility]   Mamm 12/2019  Pap - 7/2014; no longer doing ([facility])  Colon 2016 ([facility]); repeating in 1 year this time, he found some mild crohns - she called is office and since she is sx free, he is going to hold off on a colon for now.    Eye exam - 1/2020 at   Foot exam 1/2020 per podiatry - goes Q8-9 weeks for nail trim and ck    The following portions of the patient's history were reviewed and updated as appropriate: allergies, current medications, past family history, past medical history, past social history, past surgical history and problem list.    Current Outpatient Medications:   •  albuterol (PROVENTIL HFA) 108 (90 Base) MCG/ACT inhaler, Inhale 2 puffs Every 4 (Four) Hours As Needed for Wheezing., Disp: 1 inhaler, Rfl: 2  •  atenolol (TENORMIN) 25 MG tablet, Take 1 tablet by mouth Daily., Disp: 90 tablet, Rfl: 1  •  BLACK CURRANT SEED OIL PO, Take  by mouth 2 (Two) Times a Day., Disp: , Rfl:   •  budesonide-formoterol (SYMBICORT) 80-4.5 MCG/ACT inhaler, Inhale 2 puffs 2 (Two) Times a Day., Disp: 1 inhaler, Rfl: 5  •  cholecalciferol (VITAMIN D3) 400 units tablet, Take 50 Units by mouth Daily., Disp: , Rfl:   •  Coenzyme Q10 (COQ10 PO), Take  by mouth As Needed., Disp: , Rfl:   •  diclofenac (VOLTAREN) 75 MG EC tablet, Take 1 tablet by mouth 2 (Two) Times a Day As Needed (pain)., Disp: 180 tablet, Rfl: 1  •  ferrous sulfate 325 (65 FE) MG EC tablet, Take 1 tablet by mouth 2 (Two) Times a Day., Disp: 60 tablet, Rfl:  11  •  glucose blood test strip, 1 po bid as directed- One touch ultra Blue 100, Disp: 100 each, Rfl: 12  •  glucose monitor monitoring kit, 1 each Daily., Disp: 1 each, Rfl: 0  •  Lancets misc, 1 each Daily., Disp: 60 each, Rfl: 11  •  levothyroxine (SYNTHROID, LEVOTHROID) 150 MCG tablet, Take 150 mcg by mouth daily., Disp: , Rfl:   •  losartan (COZAAR) 25 MG tablet, TAKE ONE TABLET BY MOUTH DAILY, Disp: 90 tablet, Rfl: 1  •  lovastatin (MEVACOR) 20 MG tablet, Take 1 tablet by mouth Every Night., Disp: 90 tablet, Rfl: 1  •  omeprazole (priLOSEC) 20 MG capsule, 1 PO BID with Diclofenac, Disp: 180 capsule, Rfl: 1  •  polyethyl glycol-propyl glycol (SYSTANE) 0.4-0.3 % solution ophthalmic solution, 1 drop As Needed., Disp: , Rfl:   •  TURMERIC PO, Take 3,000 mg by mouth Daily., Disp: , Rfl:   •  vitamin B-12 (CYANOCOBALAMIN) 1000 MCG tablet, Take 1,000 mcg by mouth 2 (Two) Times a Day., Disp: , Rfl:   •  vitamin C (ASCORBIC ACID) 500 MG tablet, Take 2,000 mg by mouth Daily., Disp: , Rfl:      Review of Systems   Constitutional: Negative for chills, fatigue and fever.   Respiratory: Negative for cough, chest tightness and shortness of breath.    Cardiovascular: Negative for chest pain.   Gastrointestinal: Negative for abdominal pain, diarrhea, nausea and vomiting.   Endocrine: Negative for cold intolerance and heat intolerance.   Musculoskeletal: Positive for arthralgias and myalgias.   Neurological: Negative for dizziness.       Objective   Physical Exam   Constitutional: She is oriented to person, place, and time. She appears well-developed and well-nourished.   HENT:   Head: Normocephalic and atraumatic.   Eyes: Pupils are equal, round, and reactive to light. Conjunctivae and EOM are normal.   Neck: Normal range of motion.   Cardiovascular: Normal rate, regular rhythm and normal heart sounds.   Pulmonary/Chest: Effort normal and breath sounds normal.   Abdominal: Soft. Bowel sounds are normal.   Musculoskeletal:  "Normal range of motion.   Neurological: She is alert and oriented to person, place, and time. She has normal reflexes. Gait (using a cane) abnormal.   Skin: Skin is warm and dry.   Psychiatric: She has a normal mood and affect. Her behavior is normal. Judgment and thought content normal.     Vitals:    05/19/20 0930 05/19/20 0957   BP: 148/92 134/84   Pulse: 70    Resp: 16    Temp: 97.6 °F (36.4 °C)    TempSrc: Temporal    SpO2: 98%    Weight: 104 kg (228 lb 12.8 oz)    Height: 170.2 cm (67.01\")          Assessment/Plan   Livier was seen today for follow-up, diabetes, hypertension, hyperlipidemia and heartburn.    Diagnoses and all orders for this visit:    Type 2 diabetes mellitus without complication, without long-term current use of insulin (CMS/[facility])  -     POC Glycosylated Hemoglobin (Hb A1C)    Morbidly obese (CMS/[facility])    Arthralgia of both knees    Essential hypertension    Malignant neoplasm of thyroid gland (CMS/[facility])    Dyslipidemia  -     lovastatin (MEVACOR) 20 MG tablet; Take 1 tablet by mouth Every Night.    Gastroesophageal reflux disease, esophagitis presence not specified  -     omeprazole (priLOSEC) 20 MG capsule; 1 PO BID with Diclofenac      A1C looks great.   No need for additional albs  Continue current meds unchanged  Will start Lovastatin, asked to let me know if insurance is not covering  Return in about 6 months (around 11/19/2020).             "

## 2020-08-25 ENCOUNTER — TELEPHONE (OUTPATIENT)
Dept: INTERNAL MEDICINE | Facility: CLINIC | Age: 65
End: 2020-08-25

## 2020-08-25 NOTE — TELEPHONE ENCOUNTER
PN to quarantine.  I let her know that they will have her quarantine for 14 days after testing.  She stated undderstanding

## 2020-08-25 NOTE — TELEPHONE ENCOUNTER
Caller: Livier Gutierrez    Relationship to patient: Self    Best call back number: [phone]     Concerns or Questions if Applicable: PT CALLED STATING THAT SHE WAS EXPOSED TO COVID FROM HER SISTER.  PT HAS AN APPT TOMORROW TO GET TESTED.  PT ASKED IF SHE NEEDED TO DO ANYTHING.    Travel screen questions: N/A

## 2020-08-27 ENCOUNTER — HOSPITAL ENCOUNTER (INPATIENT)
Facility: HOSPITAL | Age: 65
LOS: 4 days | Discharge: HOME HEALTH | End: 2020-09-01
Attending: EMERGENCY MEDICINE | Admitting: INTERNAL MEDICINE

## 2020-08-27 ENCOUNTER — APPOINTMENT (OUTPATIENT)
Dept: GENERAL RADIOLOGY | Facility: HOSPITAL | Age: 65
End: 2020-08-27

## 2020-08-27 DIAGNOSIS — R09.02 HYPOXIA: ICD-10-CM

## 2020-08-27 DIAGNOSIS — Z86.2 HISTORY OF SARCOIDOSIS: ICD-10-CM

## 2020-08-27 DIAGNOSIS — E87.1 HYPONATREMIA: ICD-10-CM

## 2020-08-27 DIAGNOSIS — Z86.79 HISTORY OF HYPERTENSION: ICD-10-CM

## 2020-08-27 DIAGNOSIS — R43.2 LOSS OF TASTE: ICD-10-CM

## 2020-08-27 DIAGNOSIS — U07.1 PNEUMONIA DUE TO COVID-19 VIRUS: Primary | ICD-10-CM

## 2020-08-27 DIAGNOSIS — R43.0 LOSS OF PERCEPTION FOR SMELL: ICD-10-CM

## 2020-08-27 DIAGNOSIS — Z86.39 HISTORY OF DIABETES MELLITUS: ICD-10-CM

## 2020-08-27 DIAGNOSIS — R09.02 HYPOXIC: ICD-10-CM

## 2020-08-27 DIAGNOSIS — R06.02 SHORTNESS OF BREATH: ICD-10-CM

## 2020-08-27 DIAGNOSIS — J12.82 PNEUMONIA DUE TO COVID-19 VIRUS: Primary | ICD-10-CM

## 2020-08-27 LAB
ALBUMIN SERPL-MCNC: 3.5 G/DL (ref 3.5–5.2)
ALBUMIN/GLOB SERPL: 1 G/DL
ALP SERPL-CCNC: 51 U/L (ref 39–117)
ALT SERPL W P-5'-P-CCNC: 24 U/L (ref 1–33)
ANION GAP SERPL CALCULATED.3IONS-SCNC: 13 MMOL/L (ref 5–15)
AST SERPL-CCNC: 32 U/L (ref 1–32)
BASOPHILS # BLD AUTO: 0.02 10*3/MM3 (ref 0–0.2)
BASOPHILS NFR BLD AUTO: 0.3 % (ref 0–1.5)
BILIRUB SERPL-MCNC: 0.4 MG/DL (ref 0–1.2)
BUN SERPL-MCNC: 16 MG/DL (ref 8–23)
BUN/CREAT SERPL: 14.7 (ref 7–25)
CALCIUM SPEC-SCNC: 7.3 MG/DL (ref 8.6–10.5)
CHLORIDE SERPL-SCNC: 89 MMOL/L (ref 98–107)
CO2 SERPL-SCNC: 23 MMOL/L (ref 22–29)
CREAT SERPL-MCNC: 1.09 MG/DL (ref 0.57–1)
D DIMER PPP FEU-MCNC: 0.87 MCGFEU/ML (ref 0–0.56)
DEPRECATED RDW RBC AUTO: 42.1 FL (ref 37–54)
EOSINOPHIL # BLD AUTO: 0.05 10*3/MM3 (ref 0–0.4)
EOSINOPHIL NFR BLD AUTO: 0.9 % (ref 0.3–6.2)
ERYTHROCYTE [DISTWIDTH] IN BLOOD BY AUTOMATED COUNT: 15.5 % (ref 12.3–15.4)
GFR SERPL CREATININE-BSD FRML MDRD: 61 ML/MIN/1.73
GLOBULIN UR ELPH-MCNC: 3.5 GM/DL
GLUCOSE SERPL-MCNC: 124 MG/DL (ref 65–99)
HCT VFR BLD AUTO: 32.6 % (ref 34–46.6)
HGB BLD-MCNC: 10.4 G/DL (ref 12–15.9)
HOLD SPECIMEN: NORMAL
HOLD SPECIMEN: NORMAL
IMM GRANULOCYTES # BLD AUTO: 0.14 10*3/MM3 (ref 0–0.05)
IMM GRANULOCYTES NFR BLD AUTO: 2.4 % (ref 0–0.5)
LYMPHOCYTES # BLD AUTO: 1.1 10*3/MM3 (ref 0.7–3.1)
LYMPHOCYTES NFR BLD AUTO: 18.8 % (ref 19.6–45.3)
MAGNESIUM SERPL-MCNC: 2.3 MG/DL (ref 1.6–2.4)
MCH RBC QN AUTO: 23.7 PG (ref 26.6–33)
MCHC RBC AUTO-ENTMCNC: 31.9 G/DL (ref 31.5–35.7)
MCV RBC AUTO: 74.4 FL (ref 79–97)
MONOCYTES # BLD AUTO: 0.45 10*3/MM3 (ref 0.1–0.9)
MONOCYTES NFR BLD AUTO: 7.7 % (ref 5–12)
NEUTROPHILS NFR BLD AUTO: 4.09 10*3/MM3 (ref 1.7–7)
NEUTROPHILS NFR BLD AUTO: 69.9 % (ref 42.7–76)
NRBC BLD AUTO-RTO: 0 /100 WBC (ref 0–0.2)
PLATELET # BLD AUTO: 285 10*3/MM3 (ref 140–450)
PMV BLD AUTO: 7.7 FL (ref 6–12)
POTASSIUM SERPL-SCNC: 3.5 MMOL/L (ref 3.5–5.2)
PROT SERPL-MCNC: 7 G/DL (ref 6–8.5)
RBC # BLD AUTO: 4.38 10*6/MM3 (ref 3.77–5.28)
SODIUM SERPL-SCNC: 125 MMOL/L (ref 136–145)
TROPONIN T SERPL-MCNC: <0.01 NG/ML (ref 0–0.03)
WBC # BLD AUTO: 5.85 10*3/MM3 (ref 3.4–10.8)
WHOLE BLOOD HOLD SPECIMEN: NORMAL
WHOLE BLOOD HOLD SPECIMEN: NORMAL

## 2020-08-27 PROCEDURE — 71045 X-RAY EXAM CHEST 1 VIEW: CPT

## 2020-08-27 PROCEDURE — 84484 ASSAY OF TROPONIN QUANT: CPT

## 2020-08-27 PROCEDURE — 93005 ELECTROCARDIOGRAM TRACING: CPT | Performed by: EMERGENCY MEDICINE

## 2020-08-27 PROCEDURE — 83735 ASSAY OF MAGNESIUM: CPT

## 2020-08-27 PROCEDURE — 85025 COMPLETE CBC W/AUTO DIFF WBC: CPT

## 2020-08-27 PROCEDURE — 85379 FIBRIN DEGRADATION QUANT: CPT | Performed by: PHYSICIAN ASSISTANT

## 2020-08-27 PROCEDURE — 93005 ELECTROCARDIOGRAM TRACING: CPT

## 2020-08-27 PROCEDURE — 86140 C-REACTIVE PROTEIN: CPT | Performed by: PHYSICIAN ASSISTANT

## 2020-08-27 PROCEDURE — 99285 EMERGENCY DEPT VISIT HI MDM: CPT

## 2020-08-27 PROCEDURE — 80053 COMPREHEN METABOLIC PANEL: CPT

## 2020-08-27 PROCEDURE — 86901 BLOOD TYPING SEROLOGIC RH(D): CPT

## 2020-08-27 PROCEDURE — 86900 BLOOD TYPING SEROLOGIC ABO: CPT

## 2020-08-27 PROCEDURE — 84443 ASSAY THYROID STIM HORMONE: CPT | Performed by: PHYSICIAN ASSISTANT

## 2020-08-27 PROCEDURE — 83615 LACTATE (LD) (LDH) ENZYME: CPT | Performed by: PHYSICIAN ASSISTANT

## 2020-08-27 PROCEDURE — 84145 PROCALCITONIN (PCT): CPT | Performed by: PHYSICIAN ASSISTANT

## 2020-08-27 RX ORDER — SODIUM CHLORIDE 0.9 % (FLUSH) 0.9 %
10 SYRINGE (ML) INJECTION AS NEEDED
Status: DISCONTINUED | OUTPATIENT
Start: 2020-08-27 | End: 2020-09-01 | Stop reason: HOSPADM

## 2020-08-28 ENCOUNTER — APPOINTMENT (OUTPATIENT)
Dept: CT IMAGING | Facility: HOSPITAL | Age: 65
End: 2020-08-28

## 2020-08-28 PROBLEM — E11.9 DM2 (DIABETES MELLITUS, TYPE 2) (HCC): Status: ACTIVE | Noted: 2020-08-28

## 2020-08-28 PROBLEM — U07.1 PNEUMONIA DUE TO COVID-19 VIRUS: Status: ACTIVE | Noted: 2020-08-28

## 2020-08-28 PROBLEM — R07.89 ATYPICAL CHEST PAIN: Status: ACTIVE | Noted: 2020-08-28

## 2020-08-28 PROBLEM — J12.82 PNEUMONIA DUE TO COVID-19 VIRUS: Status: ACTIVE | Noted: 2020-08-28

## 2020-08-28 PROBLEM — D86.9 SARCOIDOSIS: Status: ACTIVE | Noted: 2020-08-28

## 2020-08-28 PROBLEM — E87.1 HYPONATREMIA: Status: ACTIVE | Noted: 2020-08-28

## 2020-08-28 PROBLEM — R09.02 HYPOXIC: Status: ACTIVE | Noted: 2020-08-28

## 2020-08-28 LAB
ABO GROUP BLD: NORMAL
ABO GROUP BLD: NORMAL
ANION GAP SERPL CALCULATED.3IONS-SCNC: 10 MMOL/L (ref 5–15)
ANION GAP SERPL CALCULATED.3IONS-SCNC: 11 MMOL/L (ref 5–15)
ANION GAP SERPL CALCULATED.3IONS-SCNC: 13 MMOL/L (ref 5–15)
ARTERIAL PATENCY WRIST A: ABNORMAL
ATMOSPHERIC PRESS: ABNORMAL MM[HG]
B PARAPERT DNA SPEC QL NAA+PROBE: NOT DETECTED
B PERT DNA SPEC QL NAA+PROBE: NOT DETECTED
BACTERIA UR QL AUTO: NORMAL /HPF
BASE EXCESS BLDA CALC-SCNC: 2.9 MMOL/L (ref 0–2)
BASOPHILS # BLD AUTO: 0.02 10*3/MM3 (ref 0–0.2)
BASOPHILS NFR BLD AUTO: 0.3 % (ref 0–1.5)
BDY SITE: ABNORMAL
BILIRUB UR QL STRIP: NEGATIVE
BLD GP AB SCN SERPL QL: NEGATIVE
BODY TEMPERATURE: 37 C
BUN SERPL-MCNC: 13 MG/DL (ref 8–23)
BUN SERPL-MCNC: 14 MG/DL (ref 8–23)
BUN SERPL-MCNC: 14 MG/DL (ref 8–23)
BUN/CREAT SERPL: 13.4 (ref 7–25)
BUN/CREAT SERPL: 14.4 (ref 7–25)
BUN/CREAT SERPL: 15.7 (ref 7–25)
C PNEUM DNA NPH QL NAA+NON-PROBE: NOT DETECTED
CALCIUM SPEC-SCNC: 7.7 MG/DL (ref 8.6–10.5)
CALCIUM SPEC-SCNC: 7.8 MG/DL (ref 8.6–10.5)
CALCIUM SPEC-SCNC: 7.9 MG/DL (ref 8.6–10.5)
CHLORIDE SERPL-SCNC: 94 MMOL/L (ref 98–107)
CHLORIDE SERPL-SCNC: 97 MMOL/L (ref 98–107)
CHLORIDE SERPL-SCNC: 98 MMOL/L (ref 98–107)
CLARITY UR: CLEAR
CO2 BLDA-SCNC: 27.8 MMOL/L (ref 22–33)
CO2 SERPL-SCNC: 26 MMOL/L (ref 22–29)
CO2 SERPL-SCNC: 26 MMOL/L (ref 22–29)
CO2 SERPL-SCNC: 28 MMOL/L (ref 22–29)
COHGB MFR BLD: 0.6 % (ref 0–2)
COLOR UR: YELLOW
CORTIS SERPL-MCNC: 8.55 MCG/DL
CREAT SERPL-MCNC: 0.89 MG/DL (ref 0.57–1)
CREAT SERPL-MCNC: 0.97 MG/DL (ref 0.57–1)
CREAT SERPL-MCNC: 0.97 MG/DL (ref 0.57–1)
CRP SERPL-MCNC: 10.98 MG/DL (ref 0–0.5)
D-LACTATE SERPL-SCNC: 1 MMOL/L (ref 0.5–2)
DEPRECATED RDW RBC AUTO: 41 FL (ref 37–54)
EOSINOPHIL # BLD AUTO: 0.02 10*3/MM3 (ref 0–0.4)
EOSINOPHIL NFR BLD AUTO: 0.3 % (ref 0.3–6.2)
EPAP: 0
ERYTHROCYTE [DISTWIDTH] IN BLOOD BY AUTOMATED COUNT: 15.2 % (ref 12.3–15.4)
FERRITIN SERPL-MCNC: 282.7 NG/ML (ref 13–150)
FLUAV H1 2009 PAND RNA NPH QL NAA+PROBE: NOT DETECTED
FLUAV H1 HA GENE NPH QL NAA+PROBE: NOT DETECTED
FLUAV H3 RNA NPH QL NAA+PROBE: NOT DETECTED
FLUAV SUBTYP SPEC NAA+PROBE: NOT DETECTED
FLUBV RNA ISLT QL NAA+PROBE: NOT DETECTED
GFR SERPL CREATININE-BSD FRML MDRD: 69 ML/MIN/1.73
GFR SERPL CREATININE-BSD FRML MDRD: 69 ML/MIN/1.73
GFR SERPL CREATININE-BSD FRML MDRD: 77 ML/MIN/1.73
GLUCOSE BLDC GLUCOMTR-MCNC: 108 MG/DL (ref 70–130)
GLUCOSE BLDC GLUCOMTR-MCNC: 117 MG/DL (ref 70–130)
GLUCOSE BLDC GLUCOMTR-MCNC: 136 MG/DL (ref 70–130)
GLUCOSE SERPL-MCNC: 102 MG/DL (ref 65–99)
GLUCOSE SERPL-MCNC: 121 MG/DL (ref 65–99)
GLUCOSE SERPL-MCNC: 146 MG/DL (ref 65–99)
GLUCOSE UR STRIP-MCNC: NEGATIVE MG/DL
HADV DNA SPEC NAA+PROBE: NOT DETECTED
HAV IGM SERPL QL IA: NORMAL
HBV CORE IGM SERPL QL IA: NORMAL
HBV SURFACE AG SERPL QL IA: NORMAL
HCO3 BLDA-SCNC: 26.7 MMOL/L (ref 20–26)
HCOV 229E RNA SPEC QL NAA+PROBE: NOT DETECTED
HCOV HKU1 RNA SPEC QL NAA+PROBE: NOT DETECTED
HCOV NL63 RNA SPEC QL NAA+PROBE: NOT DETECTED
HCOV OC43 RNA SPEC QL NAA+PROBE: NOT DETECTED
HCT VFR BLD AUTO: 33.4 % (ref 34–46.6)
HCT VFR BLD CALC: 32.4 %
HCV AB SER DONR QL: NORMAL
HGB BLD-MCNC: 10.8 G/DL (ref 12–15.9)
HGB BLDA-MCNC: 10.6 G/DL (ref 14–18)
HGB UR QL STRIP.AUTO: NEGATIVE
HIV1+2 AB SER QL: NORMAL
HMPV RNA NPH QL NAA+NON-PROBE: NOT DETECTED
HOLD SPECIMEN: NORMAL
HPIV1 RNA SPEC QL NAA+PROBE: NOT DETECTED
HPIV2 RNA SPEC QL NAA+PROBE: NOT DETECTED
HPIV3 RNA NPH QL NAA+PROBE: NOT DETECTED
HPIV4 P GENE NPH QL NAA+PROBE: NOT DETECTED
IMM GRANULOCYTES # BLD AUTO: 0.18 10*3/MM3 (ref 0–0.05)
IMM GRANULOCYTES NFR BLD AUTO: 2.6 % (ref 0–0.5)
INHALED O2 CONCENTRATION: 21 %
IPAP: 0
KETONES UR QL STRIP: NEGATIVE
LDH SERPL-CCNC: 367 U/L (ref 135–214)
LEUKOCYTE ESTERASE UR QL STRIP.AUTO: ABNORMAL
LYMPHOCYTES # BLD AUTO: 0.64 10*3/MM3 (ref 0.7–3.1)
LYMPHOCYTES NFR BLD AUTO: 9.3 % (ref 19.6–45.3)
M PNEUMO IGG SER IA-ACNC: NOT DETECTED
MCH RBC QN AUTO: 24.2 PG (ref 26.6–33)
MCHC RBC AUTO-ENTMCNC: 32.3 G/DL (ref 31.5–35.7)
MCV RBC AUTO: 74.7 FL (ref 79–97)
METHGB BLD QL: 0.6 % (ref 0–1.5)
MODALITY: ABNORMAL
MONOCYTES # BLD AUTO: 0.32 10*3/MM3 (ref 0.1–0.9)
MONOCYTES NFR BLD AUTO: 4.6 % (ref 5–12)
NEUTROPHILS NFR BLD AUTO: 5.72 10*3/MM3 (ref 1.7–7)
NEUTROPHILS NFR BLD AUTO: 82.9 % (ref 42.7–76)
NITRITE UR QL STRIP: NEGATIVE
NOTE: ABNORMAL
NRBC BLD AUTO-RTO: 0 /100 WBC (ref 0–0.2)
OSMOLALITY SERPL: 278 MOSM/KG (ref 275–295)
OSMOLALITY UR: 157 MOSM/KG (ref 300–1100)
OXYHGB MFR BLDV: 89.2 % (ref 94–99)
PAW @ PEAK INSP FLOW SETTING VENT: 0 CMH2O
PCO2 BLDA: 36.8 MM HG (ref 35–45)
PCO2 TEMP ADJ BLD: 36.8 MM HG (ref 35–45)
PH BLDA: 7.47 PH UNITS (ref 7.35–7.45)
PH UR STRIP.AUTO: 6.5 [PH] (ref 5–8)
PH, TEMP CORRECTED: 7.47 PH UNITS
PLATELET # BLD AUTO: 306 10*3/MM3 (ref 140–450)
PMV BLD AUTO: 8 FL (ref 6–12)
PO2 BLDA: 56.5 MM HG (ref 83–108)
PO2 TEMP ADJ BLD: 56.5 MM HG (ref 83–108)
POTASSIUM SERPL-SCNC: 3.7 MMOL/L (ref 3.5–5.2)
POTASSIUM SERPL-SCNC: 4.1 MMOL/L (ref 3.5–5.2)
POTASSIUM SERPL-SCNC: 4.1 MMOL/L (ref 3.5–5.2)
PROCALCITONIN SERPL-MCNC: 0.08 NG/ML (ref 0–0.25)
PROT UR QL STRIP: NEGATIVE
RBC # BLD AUTO: 4.47 10*6/MM3 (ref 3.77–5.28)
RBC # UR: NORMAL /HPF
REF LAB TEST METHOD: NORMAL
RH BLD: POSITIVE
RH BLD: POSITIVE
RHINOVIRUS RNA SPEC NAA+PROBE: NOT DETECTED
RSV RNA NPH QL NAA+NON-PROBE: NOT DETECTED
S PNEUM AG SPEC QL LA: NEGATIVE
SARS-COV-2 RNA NPH QL NAA+NON-PROBE: DETECTED
SODIUM SERPL-SCNC: 131 MMOL/L (ref 136–145)
SODIUM SERPL-SCNC: 136 MMOL/L (ref 136–145)
SODIUM SERPL-SCNC: 136 MMOL/L (ref 136–145)
SODIUM UR-SCNC: 26 MMOL/L
SP GR UR STRIP: 1.01 (ref 1–1.03)
SQUAMOUS #/AREA URNS HPF: NORMAL /[HPF]
T&S EXPIRATION DATE: NORMAL
TOTAL RATE: 0 BREATHS/MINUTE
TROPONIN T SERPL-MCNC: <0.01 NG/ML (ref 0–0.03)
TROPONIN T SERPL-MCNC: <0.01 NG/ML (ref 0–0.03)
TSH SERPL DL<=0.05 MIU/L-ACNC: 3.92 UIU/ML (ref 0.27–4.2)
UROBILINOGEN UR QL STRIP: ABNORMAL
WBC # BLD AUTO: 6.9 10*3/MM3 (ref 3.4–10.8)
WBC UR QL AUTO: NORMAL /HPF

## 2020-08-28 PROCEDURE — 80048 BASIC METABOLIC PNL TOTAL CA: CPT | Performed by: PHYSICIAN ASSISTANT

## 2020-08-28 PROCEDURE — 86901 BLOOD TYPING SEROLOGIC RH(D): CPT | Performed by: PHYSICIAN ASSISTANT

## 2020-08-28 PROCEDURE — 71275 CT ANGIOGRAPHY CHEST: CPT

## 2020-08-28 PROCEDURE — 93005 ELECTROCARDIOGRAM TRACING: CPT | Performed by: FAMILY MEDICINE

## 2020-08-28 PROCEDURE — 87040 BLOOD CULTURE FOR BACTERIA: CPT | Performed by: PHYSICIAN ASSISTANT

## 2020-08-28 PROCEDURE — 25010000002 ENOXAPARIN PER 10 MG: Performed by: PHYSICIAN ASSISTANT

## 2020-08-28 PROCEDURE — 86850 RBC ANTIBODY SCREEN: CPT | Performed by: PHYSICIAN ASSISTANT

## 2020-08-28 PROCEDURE — XW033E5 INTRODUCTION OF REMDESIVIR ANTI-INFECTIVE INTO PERIPHERAL VEIN, PERCUTANEOUS APPROACH, NEW TECHNOLOGY GROUP 5: ICD-10-PCS | Performed by: INTERNAL MEDICINE

## 2020-08-28 PROCEDURE — 99223 1ST HOSP IP/OBS HIGH 75: CPT | Performed by: FAMILY MEDICINE

## 2020-08-28 PROCEDURE — 84300 ASSAY OF URINE SODIUM: CPT | Performed by: PHYSICIAN ASSISTANT

## 2020-08-28 PROCEDURE — 83520 IMMUNOASSAY QUANT NOS NONAB: CPT | Performed by: FAMILY MEDICINE

## 2020-08-28 PROCEDURE — 85025 COMPLETE CBC W/AUTO DIFF WBC: CPT | Performed by: PHYSICIAN ASSISTANT

## 2020-08-28 PROCEDURE — 82962 GLUCOSE BLOOD TEST: CPT

## 2020-08-28 PROCEDURE — G0432 EIA HIV-1/HIV-2 SCREEN: HCPCS | Performed by: FAMILY MEDICINE

## 2020-08-28 PROCEDURE — 81001 URINALYSIS AUTO W/SCOPE: CPT | Performed by: EMERGENCY MEDICINE

## 2020-08-28 PROCEDURE — 82533 TOTAL CORTISOL: CPT | Performed by: PHYSICIAN ASSISTANT

## 2020-08-28 PROCEDURE — 82728 ASSAY OF FERRITIN: CPT | Performed by: PHYSICIAN ASSISTANT

## 2020-08-28 PROCEDURE — 93010 ELECTROCARDIOGRAM REPORT: CPT | Performed by: INTERNAL MEDICINE

## 2020-08-28 PROCEDURE — 0 IOPAMIDOL PER 1 ML: Performed by: EMERGENCY MEDICINE

## 2020-08-28 PROCEDURE — XW13325 TRANSFUSION OF CONVALESCENT PLASMA (NONAUTOLOGOUS) INTO PERIPHERAL VEIN, PERCUTANEOUS APPROACH, NEW TECHNOLOGY GROUP 5: ICD-10-PCS | Performed by: INTERNAL MEDICINE

## 2020-08-28 PROCEDURE — 36600 WITHDRAWAL OF ARTERIAL BLOOD: CPT

## 2020-08-28 PROCEDURE — 0202U NFCT DS 22 TRGT SARS-COV-2: CPT | Performed by: PHYSICIAN ASSISTANT

## 2020-08-28 PROCEDURE — 87899 AGENT NOS ASSAY W/OPTIC: CPT | Performed by: PHYSICIAN ASSISTANT

## 2020-08-28 PROCEDURE — 83930 ASSAY OF BLOOD OSMOLALITY: CPT | Performed by: PHYSICIAN ASSISTANT

## 2020-08-28 PROCEDURE — 25010000002 CEFTRIAXONE PER 250 MG: Performed by: PHYSICIAN ASSISTANT

## 2020-08-28 PROCEDURE — 25010000002 DEXAMETHASONE PER 1 MG: Performed by: PHYSICIAN ASSISTANT

## 2020-08-28 PROCEDURE — 94799 UNLISTED PULMONARY SVC/PX: CPT

## 2020-08-28 PROCEDURE — 83935 ASSAY OF URINE OSMOLALITY: CPT | Performed by: PHYSICIAN ASSISTANT

## 2020-08-28 PROCEDURE — 3E0333Z INTRODUCTION OF ANTI-INFLAMMATORY INTO PERIPHERAL VEIN, PERCUTANEOUS APPROACH: ICD-10-PCS | Performed by: INTERNAL MEDICINE

## 2020-08-28 PROCEDURE — 86900 BLOOD TYPING SEROLOGIC ABO: CPT | Performed by: PHYSICIAN ASSISTANT

## 2020-08-28 PROCEDURE — 84484 ASSAY OF TROPONIN QUANT: CPT | Performed by: PHYSICIAN ASSISTANT

## 2020-08-28 PROCEDURE — 82805 BLOOD GASES W/O2 SATURATION: CPT

## 2020-08-28 PROCEDURE — 80074 ACUTE HEPATITIS PANEL: CPT | Performed by: FAMILY MEDICINE

## 2020-08-28 PROCEDURE — 94640 AIRWAY INHALATION TREATMENT: CPT

## 2020-08-28 PROCEDURE — 83605 ASSAY OF LACTIC ACID: CPT | Performed by: PHYSICIAN ASSISTANT

## 2020-08-28 RX ORDER — DOXYCYCLINE 100 MG/1
100 CAPSULE ORAL EVERY 12 HOURS SCHEDULED
Status: DISCONTINUED | OUTPATIENT
Start: 2020-08-28 | End: 2020-08-29

## 2020-08-28 RX ORDER — ATENOLOL 25 MG/1
25 TABLET ORAL DAILY
Status: DISCONTINUED | OUTPATIENT
Start: 2020-08-28 | End: 2020-09-01 | Stop reason: HOSPADM

## 2020-08-28 RX ORDER — NICOTINE POLACRILEX 4 MG
15 LOZENGE BUCCAL
Status: DISCONTINUED | OUTPATIENT
Start: 2020-08-28 | End: 2020-09-01 | Stop reason: HOSPADM

## 2020-08-28 RX ORDER — ALBUTEROL SULFATE 90 UG/1
2 AEROSOL, METERED RESPIRATORY (INHALATION) EVERY 4 HOURS PRN
Status: DISCONTINUED | OUTPATIENT
Start: 2020-08-28 | End: 2020-09-01 | Stop reason: HOSPADM

## 2020-08-28 RX ORDER — LOSARTAN POTASSIUM 25 MG/1
25 TABLET ORAL DAILY
Status: DISCONTINUED | OUTPATIENT
Start: 2020-08-28 | End: 2020-09-01 | Stop reason: HOSPADM

## 2020-08-28 RX ORDER — SODIUM CHLORIDE 0.9 % (FLUSH) 0.9 %
10 SYRINGE (ML) INJECTION EVERY 12 HOURS SCHEDULED
Status: DISCONTINUED | OUTPATIENT
Start: 2020-08-28 | End: 2020-09-01 | Stop reason: HOSPADM

## 2020-08-28 RX ORDER — ACETAMINOPHEN 325 MG/1
650 TABLET ORAL EVERY 4 HOURS PRN
Status: DISCONTINUED | OUTPATIENT
Start: 2020-08-28 | End: 2020-09-01 | Stop reason: HOSPADM

## 2020-08-28 RX ORDER — BUDESONIDE AND FORMOTEROL FUMARATE DIHYDRATE 160; 4.5 UG/1; UG/1
2 AEROSOL RESPIRATORY (INHALATION)
Status: DISCONTINUED | OUTPATIENT
Start: 2020-08-28 | End: 2020-09-01 | Stop reason: HOSPADM

## 2020-08-28 RX ORDER — CHOLECALCIFEROL (VITAMIN D3) 125 MCG
5 CAPSULE ORAL NIGHTLY PRN
Status: DISCONTINUED | OUTPATIENT
Start: 2020-08-28 | End: 2020-09-01 | Stop reason: HOSPADM

## 2020-08-28 RX ORDER — HEPARIN SODIUM 5000 [USP'U]/ML
5000 INJECTION, SOLUTION INTRAVENOUS; SUBCUTANEOUS EVERY 12 HOURS SCHEDULED
Status: CANCELLED | OUTPATIENT
Start: 2020-08-28

## 2020-08-28 RX ORDER — DEXAMETHASONE SODIUM PHOSPHATE 4 MG/ML
6 INJECTION, SOLUTION INTRA-ARTICULAR; INTRALESIONAL; INTRAMUSCULAR; INTRAVENOUS; SOFT TISSUE ONCE
Status: COMPLETED | OUTPATIENT
Start: 2020-08-28 | End: 2020-08-28

## 2020-08-28 RX ORDER — DEXAMETHASONE SODIUM PHOSPHATE 4 MG/ML
6 INJECTION, SOLUTION INTRA-ARTICULAR; INTRALESIONAL; INTRAMUSCULAR; INTRAVENOUS; SOFT TISSUE DAILY
Status: DISCONTINUED | OUTPATIENT
Start: 2020-08-29 | End: 2020-08-28

## 2020-08-28 RX ORDER — SODIUM CHLORIDE 0.9 % (FLUSH) 0.9 %
10 SYRINGE (ML) INJECTION AS NEEDED
Status: DISCONTINUED | OUTPATIENT
Start: 2020-08-28 | End: 2020-09-01 | Stop reason: HOSPADM

## 2020-08-28 RX ORDER — ATORVASTATIN CALCIUM 10 MG/1
10 TABLET, FILM COATED ORAL NIGHTLY
Status: DISCONTINUED | OUTPATIENT
Start: 2020-08-28 | End: 2020-09-01 | Stop reason: HOSPADM

## 2020-08-28 RX ORDER — PANTOPRAZOLE SODIUM 40 MG/1
40 TABLET, DELAYED RELEASE ORAL EVERY MORNING
Status: DISCONTINUED | OUTPATIENT
Start: 2020-08-28 | End: 2020-09-01 | Stop reason: HOSPADM

## 2020-08-28 RX ORDER — LEVOTHYROXINE SODIUM 0.15 MG/1
150 TABLET ORAL
Status: DISCONTINUED | OUTPATIENT
Start: 2020-08-28 | End: 2020-09-01 | Stop reason: HOSPADM

## 2020-08-28 RX ORDER — DEXAMETHASONE 4 MG/1
6 TABLET ORAL
Status: DISCONTINUED | OUTPATIENT
Start: 2020-08-29 | End: 2020-09-01 | Stop reason: HOSPADM

## 2020-08-28 RX ORDER — BUDESONIDE AND FORMOTEROL FUMARATE DIHYDRATE 80; 4.5 UG/1; UG/1
2 AEROSOL RESPIRATORY (INHALATION)
Status: DISCONTINUED | OUTPATIENT
Start: 2020-08-28 | End: 2020-08-28

## 2020-08-28 RX ORDER — DEXTROSE MONOHYDRATE 25 G/50ML
25 INJECTION, SOLUTION INTRAVENOUS
Status: DISCONTINUED | OUTPATIENT
Start: 2020-08-28 | End: 2020-09-01 | Stop reason: HOSPADM

## 2020-08-28 RX ORDER — ONDANSETRON 2 MG/ML
4 INJECTION INTRAMUSCULAR; INTRAVENOUS EVERY 6 HOURS PRN
Status: DISCONTINUED | OUTPATIENT
Start: 2020-08-28 | End: 2020-09-01 | Stop reason: HOSPADM

## 2020-08-28 RX ADMIN — CEFTRIAXONE SODIUM 1 G: 1 INJECTION, POWDER, FOR SOLUTION INTRAMUSCULAR; INTRAVENOUS at 04:10

## 2020-08-28 RX ADMIN — ENOXAPARIN SODIUM 40 MG: 40 INJECTION SUBCUTANEOUS at 08:38

## 2020-08-28 RX ADMIN — IOPAMIDOL 70 ML: 755 INJECTION, SOLUTION INTRAVENOUS at 01:23

## 2020-08-28 RX ADMIN — PANTOPRAZOLE SODIUM 40 MG: 40 TABLET, DELAYED RELEASE ORAL at 08:38

## 2020-08-28 RX ADMIN — SODIUM CHLORIDE, PRESERVATIVE FREE 10 ML: 5 INJECTION INTRAVENOUS at 08:40

## 2020-08-28 RX ADMIN — LEVOTHYROXINE SODIUM 150 MCG: 150 TABLET ORAL at 05:07

## 2020-08-28 RX ADMIN — LOSARTAN POTASSIUM 25 MG: 25 TABLET, FILM COATED ORAL at 08:39

## 2020-08-28 RX ADMIN — ATORVASTATIN CALCIUM 10 MG: 10 TABLET, FILM COATED ORAL at 20:38

## 2020-08-28 RX ADMIN — DEXAMETHASONE SODIUM PHOSPHATE 6 MG: 4 INJECTION, SOLUTION INTRA-ARTICULAR; INTRALESIONAL; INTRAMUSCULAR; INTRAVENOUS; SOFT TISSUE at 02:16

## 2020-08-28 RX ADMIN — SODIUM CHLORIDE, PRESERVATIVE FREE 10 ML: 5 INJECTION INTRAVENOUS at 20:38

## 2020-08-28 RX ADMIN — ATENOLOL 25 MG: 25 TABLET ORAL at 08:39

## 2020-08-28 RX ADMIN — SODIUM CHLORIDE 500 ML: 9 INJECTION, SOLUTION INTRAVENOUS at 00:40

## 2020-08-28 RX ADMIN — DOXYCYCLINE 100 MG: 100 INJECTION, POWDER, LYOPHILIZED, FOR SOLUTION INTRAVENOUS at 05:08

## 2020-08-28 RX ADMIN — BUDESONIDE AND FORMOTEROL FUMARATE DIHYDRATE 2 PUFF: 160; 4.5 AEROSOL RESPIRATORY (INHALATION) at 08:13

## 2020-08-28 RX ADMIN — REMDESIVIR 200 MG: 100 INJECTION, POWDER, LYOPHILIZED, FOR SOLUTION INTRAVENOUS at 08:39

## 2020-08-28 RX ADMIN — CEFTRIAXONE SODIUM 1 G: 1 INJECTION, POWDER, FOR SOLUTION INTRAMUSCULAR; INTRAVENOUS at 20:38

## 2020-08-28 RX ADMIN — DOXYCYCLINE 100 MG: 100 CAPSULE ORAL at 20:38

## 2020-08-29 LAB
ALBUMIN SERPL-MCNC: 3.3 G/DL (ref 3.5–5.2)
ALBUMIN/GLOB SERPL: 1 G/DL
ALP SERPL-CCNC: 42 U/L (ref 39–117)
ALT SERPL W P-5'-P-CCNC: 24 U/L (ref 1–33)
ANION GAP SERPL CALCULATED.3IONS-SCNC: 9 MMOL/L (ref 5–15)
AST SERPL-CCNC: 29 U/L (ref 1–32)
BASOPHILS # BLD AUTO: 0.02 10*3/MM3 (ref 0–0.2)
BASOPHILS NFR BLD AUTO: 0.2 % (ref 0–1.5)
BH BB BLOOD EXPIRATION DATE: NORMAL
BH BB BLOOD TYPE BARCODE: 5100
BH BB DISPENSE STATUS: NORMAL
BH BB PRODUCT CODE: NORMAL
BH BB UNIT NUMBER: NORMAL
BILIRUB SERPL-MCNC: 0.3 MG/DL (ref 0–1.2)
BUN SERPL-MCNC: 17 MG/DL (ref 8–23)
BUN/CREAT SERPL: 16.2 (ref 7–25)
CALCIUM SPEC-SCNC: 7.8 MG/DL (ref 8.6–10.5)
CHLORIDE SERPL-SCNC: 98 MMOL/L (ref 98–107)
CK SERPL-CCNC: 94 U/L (ref 20–180)
CO2 SERPL-SCNC: 29 MMOL/L (ref 22–29)
CREAT SERPL-MCNC: 1.05 MG/DL (ref 0.57–1)
CRP SERPL-MCNC: 6.31 MG/DL (ref 0–0.5)
D DIMER PPP FEU-MCNC: 0.91 MCGFEU/ML (ref 0–0.56)
DEPRECATED RDW RBC AUTO: 43.4 FL (ref 37–54)
EOSINOPHIL # BLD AUTO: 0.07 10*3/MM3 (ref 0–0.4)
EOSINOPHIL NFR BLD AUTO: 0.8 % (ref 0.3–6.2)
ERYTHROCYTE [DISTWIDTH] IN BLOOD BY AUTOMATED COUNT: 15.7 % (ref 12.3–15.4)
FERRITIN SERPL-MCNC: 263 NG/ML (ref 13–150)
FIBRINOGEN PPP-MCNC: 454 MG/DL (ref 215–430)
GFR SERPL CREATININE-BSD FRML MDRD: 63 ML/MIN/1.73
GLOBULIN UR ELPH-MCNC: 3.2 GM/DL
GLUCOSE BLDC GLUCOMTR-MCNC: 101 MG/DL (ref 70–130)
GLUCOSE BLDC GLUCOMTR-MCNC: 109 MG/DL (ref 70–130)
GLUCOSE BLDC GLUCOMTR-MCNC: 110 MG/DL (ref 70–130)
GLUCOSE BLDC GLUCOMTR-MCNC: 87 MG/DL (ref 70–130)
GLUCOSE SERPL-MCNC: 120 MG/DL (ref 65–99)
HCT VFR BLD AUTO: 30.3 % (ref 34–46.6)
HGB BLD-MCNC: 9.7 G/DL (ref 12–15.9)
IMM GRANULOCYTES # BLD AUTO: 0.2 10*3/MM3 (ref 0–0.05)
IMM GRANULOCYTES NFR BLD AUTO: 2.3 % (ref 0–0.5)
LDH SERPL-CCNC: 342 U/L (ref 135–214)
LYMPHOCYTES # BLD AUTO: 1.15 10*3/MM3 (ref 0.7–3.1)
LYMPHOCYTES NFR BLD AUTO: 13.3 % (ref 19.6–45.3)
MAGNESIUM SERPL-MCNC: 2.2 MG/DL (ref 1.6–2.4)
MCH RBC QN AUTO: 24.2 PG (ref 26.6–33)
MCHC RBC AUTO-ENTMCNC: 32 G/DL (ref 31.5–35.7)
MCV RBC AUTO: 75.6 FL (ref 79–97)
MONOCYTES # BLD AUTO: 0.56 10*3/MM3 (ref 0.1–0.9)
MONOCYTES NFR BLD AUTO: 6.5 % (ref 5–12)
NEUTROPHILS NFR BLD AUTO: 6.67 10*3/MM3 (ref 1.7–7)
NEUTROPHILS NFR BLD AUTO: 76.9 % (ref 42.7–76)
NRBC BLD AUTO-RTO: 0 /100 WBC (ref 0–0.2)
PLATELET # BLD AUTO: 394 10*3/MM3 (ref 140–450)
PMV BLD AUTO: 8 FL (ref 6–12)
POTASSIUM SERPL-SCNC: 3.6 MMOL/L (ref 3.5–5.2)
PROT SERPL-MCNC: 6.5 G/DL (ref 6–8.5)
RBC # BLD AUTO: 4.01 10*6/MM3 (ref 3.77–5.28)
SODIUM SERPL-SCNC: 136 MMOL/L (ref 136–145)
UNIT  ABO: NORMAL
UNIT  RH: NORMAL
WBC # BLD AUTO: 8.67 10*3/MM3 (ref 3.4–10.8)

## 2020-08-29 PROCEDURE — 83735 ASSAY OF MAGNESIUM: CPT | Performed by: FAMILY MEDICINE

## 2020-08-29 PROCEDURE — 99232 SBSQ HOSP IP/OBS MODERATE 35: CPT | Performed by: NURSE PRACTITIONER

## 2020-08-29 PROCEDURE — 83615 LACTATE (LD) (LDH) ENZYME: CPT | Performed by: FAMILY MEDICINE

## 2020-08-29 PROCEDURE — 85384 FIBRINOGEN ACTIVITY: CPT | Performed by: FAMILY MEDICINE

## 2020-08-29 PROCEDURE — 86140 C-REACTIVE PROTEIN: CPT | Performed by: FAMILY MEDICINE

## 2020-08-29 PROCEDURE — 80053 COMPREHEN METABOLIC PANEL: CPT | Performed by: FAMILY MEDICINE

## 2020-08-29 PROCEDURE — 63710000001 DEXAMETHASONE PER 0.25 MG: Performed by: HOSPITALIST

## 2020-08-29 PROCEDURE — 82728 ASSAY OF FERRITIN: CPT | Performed by: FAMILY MEDICINE

## 2020-08-29 PROCEDURE — 85379 FIBRIN DEGRADATION QUANT: CPT | Performed by: FAMILY MEDICINE

## 2020-08-29 PROCEDURE — 25010000002 ENOXAPARIN PER 10 MG: Performed by: PHYSICIAN ASSISTANT

## 2020-08-29 PROCEDURE — 85025 COMPLETE CBC W/AUTO DIFF WBC: CPT | Performed by: FAMILY MEDICINE

## 2020-08-29 PROCEDURE — 94799 UNLISTED PULMONARY SVC/PX: CPT

## 2020-08-29 PROCEDURE — 82550 ASSAY OF CK (CPK): CPT | Performed by: FAMILY MEDICINE

## 2020-08-29 PROCEDURE — 82962 GLUCOSE BLOOD TEST: CPT

## 2020-08-29 RX ORDER — BISACODYL 5 MG/1
10 TABLET, DELAYED RELEASE ORAL DAILY PRN
Status: DISCONTINUED | OUTPATIENT
Start: 2020-08-29 | End: 2020-09-01 | Stop reason: HOSPADM

## 2020-08-29 RX ORDER — BISACODYL 10 MG
10 SUPPOSITORY, RECTAL RECTAL DAILY PRN
Status: DISCONTINUED | OUTPATIENT
Start: 2020-08-29 | End: 2020-09-01 | Stop reason: HOSPADM

## 2020-08-29 RX ORDER — DOCUSATE SODIUM 100 MG/1
100 CAPSULE, LIQUID FILLED ORAL 2 TIMES DAILY
Status: DISCONTINUED | OUTPATIENT
Start: 2020-08-29 | End: 2020-09-01 | Stop reason: HOSPADM

## 2020-08-29 RX ADMIN — DOCUSATE SODIUM 100 MG: 100 CAPSULE, LIQUID FILLED ORAL at 14:07

## 2020-08-29 RX ADMIN — BUDESONIDE AND FORMOTEROL FUMARATE DIHYDRATE 2 PUFF: 160; 4.5 AEROSOL RESPIRATORY (INHALATION) at 09:45

## 2020-08-29 RX ADMIN — SODIUM CHLORIDE, PRESERVATIVE FREE 10 ML: 5 INJECTION INTRAVENOUS at 08:47

## 2020-08-29 RX ADMIN — ATORVASTATIN CALCIUM 10 MG: 10 TABLET, FILM COATED ORAL at 21:29

## 2020-08-29 RX ADMIN — POLYVINYL ALCOHOL, POVIDONE 2 DROP: 14; 6 SOLUTION/ DROPS OPHTHALMIC at 21:29

## 2020-08-29 RX ADMIN — POLYVINYL ALCOHOL, POVIDONE 2 DROP: 14; 6 SOLUTION/ DROPS OPHTHALMIC at 14:07

## 2020-08-29 RX ADMIN — PANTOPRAZOLE SODIUM 40 MG: 40 TABLET, DELAYED RELEASE ORAL at 05:45

## 2020-08-29 RX ADMIN — DOXYCYCLINE 100 MG: 100 CAPSULE ORAL at 08:47

## 2020-08-29 RX ADMIN — REMDESIVIR 100 MG: 100 INJECTION, POWDER, LYOPHILIZED, FOR SOLUTION INTRAVENOUS at 08:47

## 2020-08-29 RX ADMIN — DEXAMETHASONE 6 MG: 4 TABLET ORAL at 08:46

## 2020-08-29 RX ADMIN — SODIUM CHLORIDE, PRESERVATIVE FREE 10 ML: 5 INJECTION INTRAVENOUS at 21:29

## 2020-08-29 RX ADMIN — PANTOPRAZOLE SODIUM 40 MG: 40 TABLET, DELAYED RELEASE ORAL at 08:47

## 2020-08-29 RX ADMIN — BUDESONIDE AND FORMOTEROL FUMARATE DIHYDRATE 2 PUFF: 160; 4.5 AEROSOL RESPIRATORY (INHALATION) at 22:30

## 2020-08-29 RX ADMIN — ATENOLOL 25 MG: 25 TABLET ORAL at 08:46

## 2020-08-29 RX ADMIN — LOSARTAN POTASSIUM 25 MG: 25 TABLET, FILM COATED ORAL at 08:47

## 2020-08-29 RX ADMIN — ENOXAPARIN SODIUM 40 MG: 40 INJECTION SUBCUTANEOUS at 08:47

## 2020-08-29 RX ADMIN — LEVOTHYROXINE SODIUM 150 MCG: 150 TABLET ORAL at 05:45

## 2020-08-30 LAB
ALBUMIN SERPL-MCNC: 3.3 G/DL (ref 3.5–5.2)
ALBUMIN/GLOB SERPL: 1 G/DL
ALP SERPL-CCNC: 42 U/L (ref 39–117)
ALT SERPL W P-5'-P-CCNC: 20 U/L (ref 1–33)
ANION GAP SERPL CALCULATED.3IONS-SCNC: 10 MMOL/L (ref 5–15)
AST SERPL-CCNC: 21 U/L (ref 1–32)
BASOPHILS # BLD AUTO: 0.02 10*3/MM3 (ref 0–0.2)
BASOPHILS NFR BLD AUTO: 0.2 % (ref 0–1.5)
BILIRUB SERPL-MCNC: 0.3 MG/DL (ref 0–1.2)
BUN SERPL-MCNC: 16 MG/DL (ref 8–23)
BUN/CREAT SERPL: 16.3 (ref 7–25)
CALCIUM SPEC-SCNC: 7.9 MG/DL (ref 8.6–10.5)
CHLORIDE SERPL-SCNC: 102 MMOL/L (ref 98–107)
CK SERPL-CCNC: 89 U/L (ref 20–180)
CO2 SERPL-SCNC: 28 MMOL/L (ref 22–29)
CREAT SERPL-MCNC: 0.98 MG/DL (ref 0.57–1)
CRP SERPL-MCNC: 4.49 MG/DL (ref 0–0.5)
DEPRECATED RDW RBC AUTO: 43.8 FL (ref 37–54)
EOSINOPHIL # BLD AUTO: 0.05 10*3/MM3 (ref 0–0.4)
EOSINOPHIL NFR BLD AUTO: 0.6 % (ref 0.3–6.2)
ERYTHROCYTE [DISTWIDTH] IN BLOOD BY AUTOMATED COUNT: 15.9 % (ref 12.3–15.4)
FERRITIN SERPL-MCNC: 222.5 NG/ML (ref 13–150)
GFR SERPL CREATININE-BSD FRML MDRD: 69 ML/MIN/1.73
GLOBULIN UR ELPH-MCNC: 3.3 GM/DL
GLUCOSE BLDC GLUCOMTR-MCNC: 102 MG/DL (ref 70–130)
GLUCOSE BLDC GLUCOMTR-MCNC: 109 MG/DL (ref 70–130)
GLUCOSE BLDC GLUCOMTR-MCNC: 120 MG/DL (ref 70–130)
GLUCOSE BLDC GLUCOMTR-MCNC: 180 MG/DL (ref 70–130)
GLUCOSE SERPL-MCNC: 127 MG/DL (ref 65–99)
HCT VFR BLD AUTO: 31.8 % (ref 34–46.6)
HGB BLD-MCNC: 9.9 G/DL (ref 12–15.9)
IMM GRANULOCYTES # BLD AUTO: 0.38 10*3/MM3 (ref 0–0.05)
IMM GRANULOCYTES NFR BLD AUTO: 4.2 % (ref 0–0.5)
LYMPHOCYTES # BLD AUTO: 1.41 10*3/MM3 (ref 0.7–3.1)
LYMPHOCYTES NFR BLD AUTO: 15.7 % (ref 19.6–45.3)
MAGNESIUM SERPL-MCNC: 2 MG/DL (ref 1.6–2.4)
MCH RBC QN AUTO: 23.7 PG (ref 26.6–33)
MCHC RBC AUTO-ENTMCNC: 31.1 G/DL (ref 31.5–35.7)
MCV RBC AUTO: 76.1 FL (ref 79–97)
MONOCYTES # BLD AUTO: 0.73 10*3/MM3 (ref 0.1–0.9)
MONOCYTES NFR BLD AUTO: 8.1 % (ref 5–12)
NEUTROPHILS NFR BLD AUTO: 6.4 10*3/MM3 (ref 1.7–7)
NEUTROPHILS NFR BLD AUTO: 71.2 % (ref 42.7–76)
NRBC BLD AUTO-RTO: 0 /100 WBC (ref 0–0.2)
PLATELET # BLD AUTO: 434 10*3/MM3 (ref 140–450)
PMV BLD AUTO: 7.7 FL (ref 6–12)
POTASSIUM SERPL-SCNC: 3.5 MMOL/L (ref 3.5–5.2)
PROT SERPL-MCNC: 6.6 G/DL (ref 6–8.5)
RBC # BLD AUTO: 4.18 10*6/MM3 (ref 3.77–5.28)
SODIUM SERPL-SCNC: 140 MMOL/L (ref 136–145)
WBC # BLD AUTO: 8.99 10*3/MM3 (ref 3.4–10.8)

## 2020-08-30 PROCEDURE — 82962 GLUCOSE BLOOD TEST: CPT

## 2020-08-30 PROCEDURE — 94799 UNLISTED PULMONARY SVC/PX: CPT

## 2020-08-30 PROCEDURE — 86140 C-REACTIVE PROTEIN: CPT | Performed by: FAMILY MEDICINE

## 2020-08-30 PROCEDURE — 80053 COMPREHEN METABOLIC PANEL: CPT | Performed by: FAMILY MEDICINE

## 2020-08-30 PROCEDURE — 25010000002 ENOXAPARIN PER 10 MG: Performed by: PHYSICIAN ASSISTANT

## 2020-08-30 PROCEDURE — 82728 ASSAY OF FERRITIN: CPT | Performed by: FAMILY MEDICINE

## 2020-08-30 PROCEDURE — 83735 ASSAY OF MAGNESIUM: CPT | Performed by: FAMILY MEDICINE

## 2020-08-30 PROCEDURE — 99232 SBSQ HOSP IP/OBS MODERATE 35: CPT | Performed by: HOSPITALIST

## 2020-08-30 PROCEDURE — 82550 ASSAY OF CK (CPK): CPT | Performed by: FAMILY MEDICINE

## 2020-08-30 PROCEDURE — 63710000001 DEXAMETHASONE PER 0.25 MG: Performed by: HOSPITALIST

## 2020-08-30 PROCEDURE — 85025 COMPLETE CBC W/AUTO DIFF WBC: CPT | Performed by: FAMILY MEDICINE

## 2020-08-30 RX ADMIN — DEXAMETHASONE 6 MG: 4 TABLET ORAL at 09:27

## 2020-08-30 RX ADMIN — ATENOLOL 25 MG: 25 TABLET ORAL at 09:27

## 2020-08-30 RX ADMIN — LEVOTHYROXINE SODIUM 150 MCG: 150 TABLET ORAL at 05:29

## 2020-08-30 RX ADMIN — ENOXAPARIN SODIUM 40 MG: 40 INJECTION SUBCUTANEOUS at 09:27

## 2020-08-30 RX ADMIN — POLYVINYL ALCOHOL, POVIDONE 2 DROP: 14; 6 SOLUTION/ DROPS OPHTHALMIC at 09:42

## 2020-08-30 RX ADMIN — SODIUM CHLORIDE, PRESERVATIVE FREE 10 ML: 5 INJECTION INTRAVENOUS at 21:04

## 2020-08-30 RX ADMIN — LOSARTAN POTASSIUM 25 MG: 25 TABLET, FILM COATED ORAL at 09:28

## 2020-08-30 RX ADMIN — DOCUSATE SODIUM 100 MG: 100 CAPSULE, LIQUID FILLED ORAL at 09:27

## 2020-08-30 RX ADMIN — PANTOPRAZOLE SODIUM 40 MG: 40 TABLET, DELAYED RELEASE ORAL at 05:29

## 2020-08-30 RX ADMIN — SODIUM CHLORIDE, PRESERVATIVE FREE 10 ML: 5 INJECTION INTRAVENOUS at 09:29

## 2020-08-30 RX ADMIN — BUDESONIDE AND FORMOTEROL FUMARATE DIHYDRATE 2 PUFF: 160; 4.5 AEROSOL RESPIRATORY (INHALATION) at 20:44

## 2020-08-30 RX ADMIN — BUDESONIDE AND FORMOTEROL FUMARATE DIHYDRATE 2 PUFF: 160; 4.5 AEROSOL RESPIRATORY (INHALATION) at 09:38

## 2020-08-30 RX ADMIN — REMDESIVIR 100 MG: 100 INJECTION, POWDER, LYOPHILIZED, FOR SOLUTION INTRAVENOUS at 09:26

## 2020-08-30 RX ADMIN — DOCUSATE SODIUM 100 MG: 100 CAPSULE, LIQUID FILLED ORAL at 20:59

## 2020-08-30 RX ADMIN — ATORVASTATIN CALCIUM 10 MG: 10 TABLET, FILM COATED ORAL at 20:59

## 2020-08-31 LAB
ALBUMIN SERPL-MCNC: 3.2 G/DL (ref 3.5–5.2)
ALBUMIN/GLOB SERPL: 0.9 G/DL
ALP SERPL-CCNC: 47 U/L (ref 39–117)
ALT SERPL W P-5'-P-CCNC: 39 U/L (ref 1–33)
ANION GAP SERPL CALCULATED.3IONS-SCNC: 11 MMOL/L (ref 5–15)
AST SERPL-CCNC: 35 U/L (ref 1–32)
BASOPHILS # BLD AUTO: 0.03 10*3/MM3 (ref 0–0.2)
BASOPHILS NFR BLD AUTO: 0.3 % (ref 0–1.5)
BILIRUB SERPL-MCNC: 0.4 MG/DL (ref 0–1.2)
BUN SERPL-MCNC: 16 MG/DL (ref 8–23)
BUN/CREAT SERPL: 18.6 (ref 7–25)
CALCIUM SPEC-SCNC: 8.2 MG/DL (ref 8.6–10.5)
CHLORIDE SERPL-SCNC: 99 MMOL/L (ref 98–107)
CK SERPL-CCNC: 82 U/L (ref 20–180)
CO2 SERPL-SCNC: 26 MMOL/L (ref 22–29)
CREAT SERPL-MCNC: 0.86 MG/DL (ref 0.57–1)
CRP SERPL-MCNC: 3.08 MG/DL (ref 0–0.5)
D DIMER PPP FEU-MCNC: 0.86 MCGFEU/ML (ref 0–0.56)
DEPRECATED RDW RBC AUTO: 43.5 FL (ref 37–54)
EOSINOPHIL # BLD AUTO: 0.04 10*3/MM3 (ref 0–0.4)
EOSINOPHIL NFR BLD AUTO: 0.4 % (ref 0.3–6.2)
ERYTHROCYTE [DISTWIDTH] IN BLOOD BY AUTOMATED COUNT: 15.9 % (ref 12.3–15.4)
FERRITIN SERPL-MCNC: 208.9 NG/ML (ref 13–150)
FIBRINOGEN PPP-MCNC: 447 MG/DL (ref 215–430)
GFR SERPL CREATININE-BSD FRML MDRD: 80 ML/MIN/1.73
GLOBULIN UR ELPH-MCNC: 3.5 GM/DL
GLUCOSE BLDC GLUCOMTR-MCNC: 126 MG/DL (ref 70–130)
GLUCOSE BLDC GLUCOMTR-MCNC: 129 MG/DL (ref 70–130)
GLUCOSE BLDC GLUCOMTR-MCNC: 152 MG/DL (ref 70–130)
GLUCOSE BLDC GLUCOMTR-MCNC: 93 MG/DL (ref 70–130)
GLUCOSE SERPL-MCNC: 87 MG/DL (ref 65–99)
HCT VFR BLD AUTO: 32.4 % (ref 34–46.6)
HGB BLD-MCNC: 10.1 G/DL (ref 12–15.9)
IL6 SERPL-MCNC: 22.2 PG/ML (ref 0–15.5)
IMM GRANULOCYTES # BLD AUTO: 0.48 10*3/MM3 (ref 0–0.05)
IMM GRANULOCYTES NFR BLD AUTO: 4.4 % (ref 0–0.5)
LDH SERPL-CCNC: 312 U/L (ref 135–214)
LYMPHOCYTES # BLD AUTO: 1.7 10*3/MM3 (ref 0.7–3.1)
LYMPHOCYTES NFR BLD AUTO: 15.6 % (ref 19.6–45.3)
MAGNESIUM SERPL-MCNC: 1.9 MG/DL (ref 1.6–2.4)
MCH RBC QN AUTO: 23.4 PG (ref 26.6–33)
MCHC RBC AUTO-ENTMCNC: 31.2 G/DL (ref 31.5–35.7)
MCV RBC AUTO: 75.2 FL (ref 79–97)
MONOCYTES # BLD AUTO: 0.92 10*3/MM3 (ref 0.1–0.9)
MONOCYTES NFR BLD AUTO: 8.4 % (ref 5–12)
NEUTROPHILS NFR BLD AUTO: 7.72 10*3/MM3 (ref 1.7–7)
NEUTROPHILS NFR BLD AUTO: 70.9 % (ref 42.7–76)
NRBC BLD AUTO-RTO: 0 /100 WBC (ref 0–0.2)
PLATELET # BLD AUTO: 510 10*3/MM3 (ref 140–450)
PMV BLD AUTO: 7.8 FL (ref 6–12)
POTASSIUM SERPL-SCNC: 3.7 MMOL/L (ref 3.5–5.2)
PROT SERPL-MCNC: 6.7 G/DL (ref 6–8.5)
RBC # BLD AUTO: 4.31 10*6/MM3 (ref 3.77–5.28)
SODIUM SERPL-SCNC: 136 MMOL/L (ref 136–145)
WBC # BLD AUTO: 10.89 10*3/MM3 (ref 3.4–10.8)

## 2020-08-31 PROCEDURE — 86140 C-REACTIVE PROTEIN: CPT | Performed by: FAMILY MEDICINE

## 2020-08-31 PROCEDURE — 80053 COMPREHEN METABOLIC PANEL: CPT | Performed by: FAMILY MEDICINE

## 2020-08-31 PROCEDURE — 63710000001 DEXAMETHASONE PER 0.25 MG: Performed by: HOSPITALIST

## 2020-08-31 PROCEDURE — 25010000002 ENOXAPARIN PER 10 MG: Performed by: PHYSICIAN ASSISTANT

## 2020-08-31 PROCEDURE — 83735 ASSAY OF MAGNESIUM: CPT | Performed by: FAMILY MEDICINE

## 2020-08-31 PROCEDURE — 83615 LACTATE (LD) (LDH) ENZYME: CPT | Performed by: FAMILY MEDICINE

## 2020-08-31 PROCEDURE — 85379 FIBRIN DEGRADATION QUANT: CPT | Performed by: FAMILY MEDICINE

## 2020-08-31 PROCEDURE — 82728 ASSAY OF FERRITIN: CPT | Performed by: FAMILY MEDICINE

## 2020-08-31 PROCEDURE — 99232 SBSQ HOSP IP/OBS MODERATE 35: CPT | Performed by: HOSPITALIST

## 2020-08-31 PROCEDURE — 94799 UNLISTED PULMONARY SVC/PX: CPT

## 2020-08-31 PROCEDURE — 85025 COMPLETE CBC W/AUTO DIFF WBC: CPT | Performed by: FAMILY MEDICINE

## 2020-08-31 PROCEDURE — 85384 FIBRINOGEN ACTIVITY: CPT | Performed by: FAMILY MEDICINE

## 2020-08-31 PROCEDURE — 82550 ASSAY OF CK (CPK): CPT | Performed by: FAMILY MEDICINE

## 2020-08-31 PROCEDURE — 82962 GLUCOSE BLOOD TEST: CPT

## 2020-08-31 RX ADMIN — REMDESIVIR 100 MG: 100 INJECTION, POWDER, LYOPHILIZED, FOR SOLUTION INTRAVENOUS at 09:01

## 2020-08-31 RX ADMIN — ATORVASTATIN CALCIUM 10 MG: 10 TABLET, FILM COATED ORAL at 20:40

## 2020-08-31 RX ADMIN — LOSARTAN POTASSIUM 25 MG: 25 TABLET, FILM COATED ORAL at 09:01

## 2020-08-31 RX ADMIN — ENOXAPARIN SODIUM 40 MG: 40 INJECTION SUBCUTANEOUS at 09:02

## 2020-08-31 RX ADMIN — DOCUSATE SODIUM 100 MG: 100 CAPSULE, LIQUID FILLED ORAL at 09:01

## 2020-08-31 RX ADMIN — LEVOTHYROXINE SODIUM 150 MCG: 150 TABLET ORAL at 05:42

## 2020-08-31 RX ADMIN — BUDESONIDE AND FORMOTEROL FUMARATE DIHYDRATE 2 PUFF: 160; 4.5 AEROSOL RESPIRATORY (INHALATION) at 20:06

## 2020-08-31 RX ADMIN — POLYVINYL ALCOHOL, POVIDONE 2 DROP: 14; 6 SOLUTION/ DROPS OPHTHALMIC at 11:29

## 2020-08-31 RX ADMIN — DEXAMETHASONE 6 MG: 4 TABLET ORAL at 09:01

## 2020-08-31 RX ADMIN — BUDESONIDE AND FORMOTEROL FUMARATE DIHYDRATE 2 PUFF: 160; 4.5 AEROSOL RESPIRATORY (INHALATION) at 09:05

## 2020-08-31 RX ADMIN — SODIUM CHLORIDE, PRESERVATIVE FREE 10 ML: 5 INJECTION INTRAVENOUS at 20:41

## 2020-08-31 RX ADMIN — SODIUM CHLORIDE, PRESERVATIVE FREE 10 ML: 5 INJECTION INTRAVENOUS at 09:01

## 2020-08-31 RX ADMIN — ATENOLOL 25 MG: 25 TABLET ORAL at 09:01

## 2020-08-31 RX ADMIN — DOCUSATE SODIUM 100 MG: 100 CAPSULE, LIQUID FILLED ORAL at 20:40

## 2020-08-31 RX ADMIN — PANTOPRAZOLE SODIUM 40 MG: 40 TABLET, DELAYED RELEASE ORAL at 05:42

## 2020-09-01 ENCOUNTER — READMISSION MANAGEMENT (OUTPATIENT)
Dept: CALL CENTER | Facility: HOSPITAL | Age: 65
End: 2020-09-01

## 2020-09-01 VITALS
WEIGHT: 230 LBS | OXYGEN SATURATION: 94 % | DIASTOLIC BLOOD PRESSURE: 72 MMHG | SYSTOLIC BLOOD PRESSURE: 122 MMHG | HEART RATE: 64 BPM | BODY MASS INDEX: 36.1 KG/M2 | TEMPERATURE: 99.7 F | HEIGHT: 67 IN | RESPIRATION RATE: 18 BRPM

## 2020-09-01 PROBLEM — R07.89 ATYPICAL CHEST PAIN: Status: RESOLVED | Noted: 2020-08-28 | Resolved: 2020-09-01

## 2020-09-01 PROBLEM — E87.1 HYPONATREMIA: Status: RESOLVED | Noted: 2020-08-28 | Resolved: 2020-09-01

## 2020-09-01 LAB
ALBUMIN SERPL-MCNC: 3.2 G/DL (ref 3.5–5.2)
ALBUMIN/GLOB SERPL: 1 G/DL
ALP SERPL-CCNC: 45 U/L (ref 39–117)
ALT SERPL W P-5'-P-CCNC: 40 U/L (ref 1–33)
ANION GAP SERPL CALCULATED.3IONS-SCNC: 9 MMOL/L (ref 5–15)
AST SERPL-CCNC: 23 U/L (ref 1–32)
BASOPHILS # BLD AUTO: 0.04 10*3/MM3 (ref 0–0.2)
BASOPHILS NFR BLD AUTO: 0.4 % (ref 0–1.5)
BILIRUB SERPL-MCNC: 0.4 MG/DL (ref 0–1.2)
BUN SERPL-MCNC: 18 MG/DL (ref 8–23)
BUN/CREAT SERPL: 18.8 (ref 7–25)
CALCIUM SPEC-SCNC: 7.8 MG/DL (ref 8.6–10.5)
CHLORIDE SERPL-SCNC: 101 MMOL/L (ref 98–107)
CK SERPL-CCNC: 57 U/L (ref 20–180)
CO2 SERPL-SCNC: 28 MMOL/L (ref 22–29)
CREAT SERPL-MCNC: 0.96 MG/DL (ref 0.57–1)
CRP SERPL-MCNC: 2.21 MG/DL (ref 0–0.5)
DEPRECATED RDW RBC AUTO: 49.1 FL (ref 37–54)
EOSINOPHIL # BLD AUTO: 0.09 10*3/MM3 (ref 0–0.4)
EOSINOPHIL NFR BLD AUTO: 0.8 % (ref 0.3–6.2)
ERYTHROCYTE [DISTWIDTH] IN BLOOD BY AUTOMATED COUNT: 17.2 % (ref 12.3–15.4)
FERRITIN SERPL-MCNC: 191.8 NG/ML (ref 13–150)
GFR SERPL CREATININE-BSD FRML MDRD: 70 ML/MIN/1.73
GLOBULIN UR ELPH-MCNC: 3.2 GM/DL
GLUCOSE BLDC GLUCOMTR-MCNC: 112 MG/DL (ref 70–130)
GLUCOSE BLDC GLUCOMTR-MCNC: 93 MG/DL (ref 70–130)
GLUCOSE SERPL-MCNC: 89 MG/DL (ref 65–99)
HCT VFR BLD AUTO: 34.6 % (ref 34–46.6)
HGB BLD-MCNC: 10.3 G/DL (ref 12–15.9)
IMM GRANULOCYTES # BLD AUTO: 0.43 10*3/MM3 (ref 0–0.05)
IMM GRANULOCYTES NFR BLD AUTO: 4 % (ref 0–0.5)
LYMPHOCYTES # BLD AUTO: 1.78 10*3/MM3 (ref 0.7–3.1)
LYMPHOCYTES NFR BLD AUTO: 16.7 % (ref 19.6–45.3)
MAGNESIUM SERPL-MCNC: 2 MG/DL (ref 1.6–2.4)
MCH RBC QN AUTO: 24 PG (ref 26.6–33)
MCHC RBC AUTO-ENTMCNC: 29.8 G/DL (ref 31.5–35.7)
MCV RBC AUTO: 80.7 FL (ref 79–97)
MONOCYTES # BLD AUTO: 1.08 10*3/MM3 (ref 0.1–0.9)
MONOCYTES NFR BLD AUTO: 10.1 % (ref 5–12)
NEUTROPHILS NFR BLD AUTO: 68 % (ref 42.7–76)
NEUTROPHILS NFR BLD AUTO: 7.25 10*3/MM3 (ref 1.7–7)
NRBC BLD AUTO-RTO: 0.4 /100 WBC (ref 0–0.2)
PLATELET # BLD AUTO: 355 10*3/MM3 (ref 140–450)
PMV BLD AUTO: 8.4 FL (ref 6–12)
POTASSIUM SERPL-SCNC: 3.8 MMOL/L (ref 3.5–5.2)
PROT SERPL-MCNC: 6.4 G/DL (ref 6–8.5)
RBC # BLD AUTO: 4.29 10*6/MM3 (ref 3.77–5.28)
SODIUM SERPL-SCNC: 138 MMOL/L (ref 136–145)
WBC # BLD AUTO: 10.67 10*3/MM3 (ref 3.4–10.8)

## 2020-09-01 PROCEDURE — 25010000002 ENOXAPARIN PER 10 MG: Performed by: PHYSICIAN ASSISTANT

## 2020-09-01 PROCEDURE — 94799 UNLISTED PULMONARY SVC/PX: CPT

## 2020-09-01 PROCEDURE — 82962 GLUCOSE BLOOD TEST: CPT

## 2020-09-01 PROCEDURE — 85025 COMPLETE CBC W/AUTO DIFF WBC: CPT | Performed by: FAMILY MEDICINE

## 2020-09-01 PROCEDURE — 83735 ASSAY OF MAGNESIUM: CPT | Performed by: FAMILY MEDICINE

## 2020-09-01 PROCEDURE — 99239 HOSP IP/OBS DSCHRG MGMT >30: CPT | Performed by: NURSE PRACTITIONER

## 2020-09-01 PROCEDURE — 86140 C-REACTIVE PROTEIN: CPT | Performed by: FAMILY MEDICINE

## 2020-09-01 PROCEDURE — 63710000001 DEXAMETHASONE PER 0.25 MG: Performed by: HOSPITALIST

## 2020-09-01 PROCEDURE — 80053 COMPREHEN METABOLIC PANEL: CPT | Performed by: FAMILY MEDICINE

## 2020-09-01 PROCEDURE — 82728 ASSAY OF FERRITIN: CPT | Performed by: FAMILY MEDICINE

## 2020-09-01 PROCEDURE — 82550 ASSAY OF CK (CPK): CPT | Performed by: FAMILY MEDICINE

## 2020-09-01 RX ORDER — ASPIRIN 325 MG
325 TABLET, DELAYED RELEASE (ENTERIC COATED) ORAL DAILY
Qty: 30 TABLET | Refills: 0 | Status: SHIPPED | OUTPATIENT
Start: 2020-09-01 | End: 2020-10-01

## 2020-09-01 RX ORDER — ACETAMINOPHEN 325 MG/1
650 TABLET ORAL EVERY 4 HOURS PRN
Start: 2020-09-01

## 2020-09-01 RX ORDER — DEXAMETHASONE 6 MG/1
6 TABLET ORAL
Qty: 5 TABLET | Refills: 0 | Status: SHIPPED | OUTPATIENT
Start: 2020-09-02 | End: 2020-09-07

## 2020-09-01 RX ADMIN — LOSARTAN POTASSIUM 25 MG: 25 TABLET, FILM COATED ORAL at 09:40

## 2020-09-01 RX ADMIN — PANTOPRAZOLE SODIUM 40 MG: 40 TABLET, DELAYED RELEASE ORAL at 06:48

## 2020-09-01 RX ADMIN — ENOXAPARIN SODIUM 40 MG: 40 INJECTION SUBCUTANEOUS at 09:40

## 2020-09-01 RX ADMIN — DOCUSATE SODIUM 100 MG: 100 CAPSULE, LIQUID FILLED ORAL at 09:40

## 2020-09-01 RX ADMIN — LEVOTHYROXINE SODIUM 150 MCG: 150 TABLET ORAL at 06:48

## 2020-09-01 RX ADMIN — BUDESONIDE AND FORMOTEROL FUMARATE DIHYDRATE 2 PUFF: 160; 4.5 AEROSOL RESPIRATORY (INHALATION) at 07:54

## 2020-09-01 RX ADMIN — ATENOLOL 25 MG: 25 TABLET ORAL at 09:40

## 2020-09-01 RX ADMIN — DEXAMETHASONE 6 MG: 4 TABLET ORAL at 09:40

## 2020-09-01 RX ADMIN — SODIUM CHLORIDE, PRESERVATIVE FREE 10 ML: 5 INJECTION INTRAVENOUS at 09:41

## 2020-09-01 RX ADMIN — REMDESIVIR 100 MG: 100 INJECTION, POWDER, LYOPHILIZED, FOR SOLUTION INTRAVENOUS at 09:40

## 2020-09-02 ENCOUNTER — TRANSITIONAL CARE MANAGEMENT TELEPHONE ENCOUNTER (OUTPATIENT)
Dept: CALL CENTER | Facility: HOSPITAL | Age: 65
End: 2020-09-02

## 2020-09-02 LAB
BACTERIA SPEC AEROBE CULT: NORMAL
BACTERIA SPEC AEROBE CULT: NORMAL

## 2020-09-02 NOTE — OUTREACH NOTE
Call Center TCM Note      Responses   [facility] patient discharged from?  [place]   COVID-19 Test Status  Confirmed   Does the patient have one of the following disease processes/diagnoses(primary or secondary)?  COPD/Pneumonia   Was the primary reason for admission:  Pneumonia   TCM attempt successful?  Yes   Call start time  1245   Call end time  1247   Discharge diagnosis  PNA d/t COVID 19 virus, hypoxic, T2DM   Meds reviewed with patient/caregiver?  Yes   Is the patient having any side effects they believe may be caused by any medication additions or changes?  No   Does the patient have all medications ordered at discharge?  Yes   Is the patient taking all medications as directed (includes completed medication regime)?  Yes   Does the patient have a primary care provider?   Yes   Does the patient have an appointment with their PCP or pulmonologist within 7 days of discharge?  Greater than 7 days   Comments regarding PCP  sharan video visit on 9/11 with GUSTAVO Patterson   Nursing Interventions  Verified appointment date/time/provider   Has the patient kept scheduled appointments due by today?  N/A   What is the Home health agency?   [facility]   Psychosocial issues?  No   Did the patient receive a copy of their discharge instructions?  Yes   Nursing interventions  Reviewed instructions with patient   What is the patient's perception of their health status since discharge?  Improving   Nursing Interventions  Nurse provided patient education   Is the patient/caregiver able to teach back the hierarchy of who to call/visit for symptoms/problems? PCP, Specialist, Home health nurse, Urgent Care, ED, 911  Yes   Is the patient/caregiver able to teach back signs and symptoms of worsening condition:  Fever/chills, Shortness of breath, Chest pain   Is the patient/caregiver able to teach back importance of completing antibiotic course of treatment?  Yes   TCM call completed?  Yes   Wrap up additional comments   Patient says she is doing well, she is trying to take it easy and rest, no questions or concerns at this time.          Gila Sung RN    9/2/2020, 12:47

## 2020-09-02 NOTE — OUTREACH NOTE
Prep Survey      Responses   [facility] facility patient discharged from?  [place]   Is LACE score < 7 ?  No   Eligibility  [facility]   Date of Admission  08/27/20   Date of Discharge  09/01/20   Discharge Disposition  Home or Self Care   Discharge diagnosis  PNA d/t COVID 19 virus, hypoxic, T2DM   COVID-19 Test Status  Confirmed   Does the patient have one of the following disease processes/diagnoses(primary or secondary)?  COPD/Pneumonia   Does the patient have Home health ordered?  Yes   What is the Home health agency?   [facility]   Is there a DME ordered?  Yes   What DME was ordered?  ?? oximeter from    Prep survey completed?  Yes          Heidi Soto RN

## 2020-09-02 NOTE — OUTREACH NOTE
Call Center TCM Note      Responses   [facility] patient discharged from?  [place]   COVID-19 Test Status  Confirmed   Does the patient have one of the following disease processes/diagnoses(primary or secondary)?  COPD/Pneumonia   Was the primary reason for admission:  Pneumonia   TCM attempt successful?  No   Unsuccessful attempts  Attempt 1          Gila Sung RN    9/2/2020, 11:42

## 2020-09-03 ENCOUNTER — READMISSION MANAGEMENT (OUTPATIENT)
Dept: CALL CENTER | Facility: HOSPITAL | Age: 65
End: 2020-09-03

## 2020-09-03 NOTE — OUTREACH NOTE
COPD/PN Week 1 Survey      Responses   [facility] patient discharged from?  [place]   COVID-19 Test Status  Confirmed   Does the patient have one of the following disease processes/diagnoses(primary or secondary)?  COPD/Pneumonia   Is there a successful TCM telephone encounter documented?  No   Was the primary reason for admission:  Pneumonia   Week 1 attempt successful?  Yes   Call start time  1204   Call end time  1211   Discharge diagnosis  PNA d/t COVID 19 virus, hypoxic, T2DM   Is patient permission given to speak with other caregiver?  Yes   Person spoke with today (if not patient) and relationship  Favio/Marlene   Meds reviewed with patient/caregiver?  Yes   Is the patient having any side effects they believe may be caused by any medication additions or changes?  No   Does the patient have all medications ordered at discharge?  Yes   Is the patient taking all medications as directed (includes completed medication regime)?  Yes   Does the patient have a primary care provider?   Yes   Comments regarding PCP  Appt on the 4th.   Has the patient kept scheduled appointments due by today?  N/A   What is the Home health agency?   [facility]   Has home health visited the patient within 72 hours of discharge?  Yes   Pulse Ox monitoring  Intermittent   O2 Sat comments  93 % Sats yesterday   O2 Sat: education provided  Sat levels   Psychosocial issues?  No   Did the patient receive a copy of their discharge instructions?  Yes   Nursing interventions  Reviewed instructions with patient   What is the patient's perception of their health status since discharge?  Improving   Nursing Interventions  Nurse provided patient education   Is the patient/caregiver able to teach back the hierarchy of who to call/visit for symptoms/problems? PCP, Specialist, Home health nurse, Urgent Care, ED, 911  Yes   Additional teach back comments  SOA with exertion, cough is present on occasion. Discussed looking backwards instead of forwarding.     Is the patient/caregiver able to teach back signs and symptoms of worsening condition:  Fever/chills, Shortness of breath, Chest pain   Is the patient/caregiver able to teach back importance of completing antibiotic course of treatment?  Yes   Week 1 call completed?  Yes   Wrap up additional comments  She is feeling some better.  Encouraged a bit more each, day, rest.          Imelda Rincon RN

## 2020-09-04 ENCOUNTER — READMISSION MANAGEMENT (OUTPATIENT)
Dept: CALL CENTER | Facility: HOSPITAL | Age: 65
End: 2020-09-04

## 2020-09-04 NOTE — OUTREACH NOTE
COPD/PN Week 1 Survey      Responses   [facility] patient discharged from?  [place]   COVID-19 Test Status  Confirmed   Does the patient have one of the following disease processes/diagnoses(primary or secondary)?  COPD/Pneumonia   Is there a successful TCM telephone encounter documented?  No   Was the primary reason for admission:  Pneumonia   Week 1 attempt successful?  Yes   Call start time  0842   Call end time  0847   Meds reviewed with patient/caregiver?  Yes   Is the patient having any side effects they believe may be caused by any medication additions or changes?  No   Does the patient have all medications ordered at discharge?  Yes   Is the patient taking all medications as directed (includes completed medication regime)?  Yes   Does the patient have a primary care provider?   Yes   Has the patient kept scheduled appointments due by today?  N/A   Comments  Has telehealth appt today with PCP.   Has home health visited the patient within 72 hours of discharge?  Yes   Pulse Ox monitoring  Intermittent   Pulse Ox device source  Patient   O2 Sat comments  O2 sats 93% on room air.   O2 Sat education comments  Instructed to seek care if O2 sats remain below 92%.   Psychosocial issues?  No   Did the patient receive a copy of their discharge instructions?  Yes   Nursing interventions  Reviewed instructions with patient   What is the patient's perception of their health status since discharge?  Improving   Nursing Interventions  Nurse provided patient education   Are the patient's immunizations up to date?   Yes   Nursing interventions  Educated on importance of maintaining up to date immunizations as advised by provider, Advised patient to discuss with provider at next visit   Is the patient/caregiver able to teach back the hierarchy of who to call/visit for symptoms/problems? PCP, Specialist, Home health nurse, Urgent Care, ED, 911  Yes   Is the patient/caregiver able to teach back signs and symptoms of  worsening condition:  Fever/chills, Shortness of breath, Chest pain   Is the patient/caregiver able to teach back importance of completing antibiotic course of treatment?  Yes   Week 1 call completed?  Yes   Wrap up additional comments  States is feeling better-breathing easier. States SOA continues on exertion with occasional cough. Denies any fever or chest pain. Denies any needs today.          Charlotte Collazo, RN

## 2020-09-07 ENCOUNTER — READMISSION MANAGEMENT (OUTPATIENT)
Dept: CALL CENTER | Facility: HOSPITAL | Age: 65
End: 2020-09-07

## 2020-09-07 NOTE — OUTREACH NOTE
COPD/PN Week 1 Survey      Responses   [facility] patient discharged from?  [place]   COVID-19 Test Status  Confirmed   Does the patient have one of the following disease processes/diagnoses(primary or secondary)?  COPD/Pneumonia   Is there a successful TCM telephone encounter documented?  No   Was the primary reason for admission:  Pneumonia   Week 1 attempt successful?  Yes   Call start time  0939   Call end time  0943   Meds reviewed with patient/caregiver?  Yes   Is the patient having any side effects they believe may be caused by any medication additions or changes?  No   Does the patient have all medications ordered at discharge?  Yes   Is the patient taking all medications as directed (includes completed medication regime)?  Yes   Does the patient have a primary care provider?   Yes   Has the patient kept scheduled appointments due by today?  Yes   Has home health visited the patient within 72 hours of discharge?  Yes   Pulse Ox monitoring  Intermittent   Pulse Ox device source  Patient   O2 Sat comments  O2 sats at least 93% on air.   Psychosocial issues?  No   Did the patient receive a copy of their discharge instructions?  Yes   Nursing interventions  Reviewed instructions with patient   What is the patient's perception of their health status since discharge?  Improving   Nursing Interventions  Nurse provided patient education   Is the patient/caregiver able to teach back the hierarchy of who to call/visit for symptoms/problems? PCP, Specialist, Home health nurse, Urgent Care, ED, 911  Yes   Is the patient/caregiver able to teach back signs and symptoms of worsening condition:  Fever/chills, Shortness of breath, Chest pain   Is the patient/caregiver able to teach back importance of completing antibiotic course of treatment?  Yes   Week 1 call completed?  Yes   Wrap up additional comments  States continues to improve. States slight SOA on exertion with occasional cough continues. Denies any needs  today.          Charlotte Collazo RN

## 2020-09-10 ENCOUNTER — READMISSION MANAGEMENT (OUTPATIENT)
Dept: CALL CENTER | Facility: HOSPITAL | Age: 65
End: 2020-09-10

## 2020-09-10 NOTE — OUTREACH NOTE
COPD/PN Week 2 Survey      Responses   [facility] patient discharged from?  [place]   COVID-19 Test Status  Confirmed   Does the patient have one of the following disease processes/diagnoses(primary or secondary)?  COPD/Pneumonia   Was the primary reason for admission:  Pneumonia   Week 2 attempt successful?  Yes   Call start time  1153   Call end time  1201   Discharge diagnosis  PNA d/t COVID 19 virus, hypoxic, T2DM   Meds reviewed with patient/caregiver?  Yes   Is the patient having any side effects they believe may be caused by any medication additions or changes?  No   Does the patient have all medications ordered at discharge?  Yes   Is the patient taking all medications as directed (includes completed medication regime)?  Yes   Does the patient have a primary care provider?   Yes   Has the patient kept scheduled appointments due by today?  N/A   Comments  Appt tomorrow, office will call to discuss type of visit.   What is the Home health agency?   [facility]   Has home health visited the patient within 72 hours of discharge?  Yes   Did the patient receive a copy of their discharge instructions?  Yes   Nursing interventions  Reviewed instructions with patient   What is the patient's perception of their health status since discharge?  Improving   Nursing Interventions  Nurse provided patient education   Are the patient's immunizations up to date?   Yes   Nursing interventions  Educated on importance of maintaining up to date immunizations as advised by provider   Is the patient/caregiver able to teach back the hierarchy of who to call/visit for symptoms/problems? PCP, Specialist, Home health nurse, Urgent Care, ED, 911  Yes   Additional teach back comments  She sounds good this am.  Short call d/t to pcp calling to discuss appt type tomorrow.   Is the patient/caregiver able to teach back signs and symptoms of worsening condition:  Fever/chills, Shortness of breath, Chest pain   Is the patient/caregiver able to  teach back importance of completing antibiotic course of treatment?  Yes   Week 2 call completed?  Yes   Wrap up additional comments  Improving.          Imelda Rincon RN

## 2020-09-11 ENCOUNTER — OFFICE VISIT (OUTPATIENT)
Dept: INTERNAL MEDICINE | Facility: CLINIC | Age: 65
End: 2020-09-11

## 2020-09-11 DIAGNOSIS — U07.1 PNEUMONIA DUE TO COVID-19 VIRUS: Primary | ICD-10-CM

## 2020-09-11 DIAGNOSIS — J12.82 PNEUMONIA DUE TO COVID-19 VIRUS: Primary | ICD-10-CM

## 2020-09-11 PROCEDURE — 99442 PR PHYS/QHP TELEPHONE EVALUATION 11-20 MIN: CPT | Performed by: NURSE PRACTITIONER

## 2020-09-11 RX ORDER — TRIAMTERENE AND HYDROCHLOROTHIAZIDE 37.5; 25 MG/1; MG/1
1 CAPSULE ORAL DAILY
COMMUNITY
Start: 2020-06-19

## 2020-09-11 NOTE — PROGRESS NOTES
Subjective   Livier Gutierrez is a 65 y.o. female    Chief Complaint   Patient presents with   • Hospital F/U after COVID     History of Present Illness     You have chosen to receive care through a telephone visit. Do you consent to use a telephone visit for your medical care today? Yes    Date of Admission: 8/27/2020  9:52 PM  Date of Discharge: 9/1/2020    Livier Gutierrez is a 65 y.o. female  with past medical history of thyroid cancer, type 2 diabetes, hypertension, hyperlipidemia and sarcoidosis.  She presented to [facility] with worsening fatigue for the past 2 weeks.  She states that she also had a subjective fever at home and a cough for the past 2 weeks.  She complains of some chest tightness and pain in her upper back.  She states that she has had nasal congestion and postnasal drip for the past week and worsening cough with chest tightness and shortness of breath and wheezing along with nausea and diarrhea.  The patient's mother was admitted on 6A with COVID-19 pneumonia.  Due to worsening symptoms exposure to coronavirus she was admitted.  She was given therapy due to the fact that she showed evidence of hypoxia.     As of today, she has completed Remdesivir treatment and received convalescent plasma on 8/28/2020.  She is on day 5 of Decadron and will receive 5 additional days upon discharge.  She was given DVT prophylaxis and will continue on aspirin upon discharge for continue prophylaxis.  Due to ongoing hypoxia and underlying sarcoidosis, patient will require home oxygen upon discharge which will be set up through case management.  Have recommended home oximeter upon discharge as well.     Pt states today that she is feeling much better    She was contacted this AM via HH b/c her pulse ox had dropped slightly around 4:30 AM this morning.  States that she had got up and went to the restroom.  States that after resting her oxygen level did recover back to baseline.    Last Sp02 was  93%    Pt states that she is back to about 50-60% of her baseline.      The following portions of the patient's history were reviewed and updated as appropriate: allergies, current medications, past family history, past medical history, past social history, past surgical history and problem list.    Current Outpatient Medications:   •  acetaminophen (TYLENOL) 325 MG tablet, Take 2 tablets by mouth Every 4 (Four) Hours As Needed for Mild Pain ., Disp: , Rfl:   •  albuterol (PROVENTIL HFA) 108 (90 Base) MCG/ACT inhaler, Inhale 2 puffs Every 4 (Four) Hours As Needed for Wheezing., Disp: 1 inhaler, Rfl: 2  •  aspirin  MG tablet, Take 1 tablet by mouth Daily for 30 days., Disp: 30 tablet, Rfl: 0  •  atenolol (TENORMIN) 25 MG tablet, Take 1 tablet by mouth Daily., Disp: 90 tablet, Rfl: 1  •  BLACK CURRANT SEED OIL PO, Take  by mouth 2 (Two) Times a Day., Disp: , Rfl:   •  budesonide-formoterol (SYMBICORT) 80-4.5 MCG/ACT inhaler, Inhale 2 puffs 2 (Two) Times a Day., Disp: 1 inhaler, Rfl: 5  •  cholecalciferol (VITAMIN D3) 400 units tablet, Take 50 Units by mouth Daily., Disp: , Rfl:   •  Coenzyme Q10 (COQ10 PO), Take  by mouth As Needed., Disp: , Rfl:   •  glucose blood test strip, 1 po bid as directed- One touch ultra Blue 100, Disp: 100 each, Rfl: 12  •  glucose monitor monitoring kit, 1 each Daily., Disp: 1 each, Rfl: 0  •  Lancets misc, 1 each Daily., Disp: 60 each, Rfl: 11  •  levothyroxine (SYNTHROID, LEVOTHROID) 150 MCG tablet, Take 150 mcg by mouth daily., Disp: , Rfl:   •  losartan (COZAAR) 25 MG tablet, TAKE ONE TABLET BY MOUTH DAILY, Disp: 90 tablet, Rfl: 1  •  lovastatin (MEVACOR) 20 MG tablet, Take 1 tablet by mouth Every Night., Disp: 90 tablet, Rfl: 1  •  omeprazole (priLOSEC) 20 MG capsule, 1 PO BID with Diclofenac, Disp: 180 capsule, Rfl: 1  •  polyethyl glycol-propyl glycol (SYSTANE) 0.4-0.3 % solution ophthalmic solution, 1 drop As Needed., Disp: , Rfl:   •  triamterene-hydrochlorothiazide  (DYAZIDE) 37.5-25 MG per capsule, , Disp: , Rfl:   •  TURMERIC PO, Take 3,000 mg by mouth Daily., Disp: , Rfl:   •  vitamin B-12 (CYANOCOBALAMIN) 1000 MCG tablet, Take 1,000 mcg by mouth 2 (Two) Times a Day., Disp: , Rfl:   •  vitamin C (ASCORBIC ACID) 500 MG tablet, Take 2,000 mg by mouth Daily., Disp: , Rfl:      Review of Systems   Constitutional: Positive for fatigue. Negative for chills and fever.   HENT: Negative for congestion.    Respiratory: Negative for cough, chest tightness, shortness of breath and wheezing.    Cardiovascular: Negative for chest pain and palpitations.   Gastrointestinal: Negative for abdominal pain, diarrhea, nausea and vomiting.   Endocrine: Negative for cold intolerance and heat intolerance.   Musculoskeletal: Negative for arthralgias.   Neurological: Positive for weakness. Negative for dizziness.       Objective   Physical Exam   Constitutional: She is oriented to person, place, and time.   Neurological: She is alert and oriented to person, place, and time.   Psychiatric: She has a normal mood and affect.     There were no vitals filed for this visit.      Assessment/Plan   Livier was seen today for hospital f/u after covid.    Diagnoses and all orders for this visit:    Pneumonia due to COVID-19 virus      Patient encouraged to continue with incentive spirometer as directed  Continue with activity as tolerated  I have encouraged her to get up and move around at least every hour.  Patient verbalizes understanding  Follow-up with ID as directed  Patient to report any worsening symptoms ASAP  To the emergency department with any worsening shortness of breath.  Patient verbalized understanding    Return in about 4 weeks (around 10/9/2020).    This visit has been rescheduled as a phone visit to comply with patient safety concerns in accordance with CDC recommendations. Total time of discussion was 20 minutes.

## 2020-09-14 ENCOUNTER — READMISSION MANAGEMENT (OUTPATIENT)
Dept: CALL CENTER | Facility: HOSPITAL | Age: 65
End: 2020-09-14

## 2020-09-14 NOTE — OUTREACH NOTE
COPD/PN Week 2 Survey      Responses   [facility] patient discharged from?  [place]   COVID-19 Test Status  Confirmed   Does the patient have one of the following disease processes/diagnoses(primary or secondary)?  COPD/Pneumonia   Was the primary reason for admission:  Pneumonia   Week 2 attempt successful?  No          Iesha Pace LPN

## 2020-09-17 DIAGNOSIS — I10 ESSENTIAL HYPERTENSION: ICD-10-CM

## 2020-09-17 RX ORDER — ATENOLOL 25 MG/1
TABLET ORAL
Qty: 90 TABLET | Refills: 0 | Status: SHIPPED | OUTPATIENT
Start: 2020-09-17 | End: 2020-12-15

## 2020-09-17 RX ORDER — LOSARTAN POTASSIUM 25 MG/1
TABLET ORAL
Qty: 90 TABLET | Refills: 0 | Status: SHIPPED | OUTPATIENT
Start: 2020-09-17 | End: 2021-01-11

## 2020-09-21 ENCOUNTER — READMISSION MANAGEMENT (OUTPATIENT)
Dept: CALL CENTER | Facility: HOSPITAL | Age: 65
End: 2020-09-21

## 2020-09-21 NOTE — OUTREACH NOTE
"COPD/PN Week 3 Survey      Responses   [facility] patient discharged from?  [place]   COVID-19 Test Status  Confirmed   Does the patient have one of the following disease processes/diagnoses(primary or secondary)?  COPD/Pneumonia   Was the primary reason for admission:  Pneumonia   Week 3 attempt successful?  Yes   Call start time  0953   Call end time  1000   Meds reviewed with patient/caregiver?  Yes   Is the patient having any side effects they believe may be caused by any medication additions or changes?  No   Does the patient have all medications ordered at discharge?  Yes   Is the patient taking all medications as directed (includes completed medication regime)?  Yes   Does the patient have a primary care provider?   Yes   Has the patient kept scheduled appointments due by today?  Yes   Comments  Next appt with PCP in 4 weeks.   Has home health visited the patient within 72 hours of discharge?  Yes   Pulse Ox monitoring  Intermittent   Pulse Ox device source  Patient, Other   O2 Sat comments  States O2 sats running at 93-94%.   Psychosocial issues?  No   Did the patient receive a copy of their discharge instructions?  Yes   Nursing interventions  Reviewed instructions with patient   What is the patient's perception of their health status since discharge?  Improving   Nursing Interventions  Nurse provided patient education   Is the patient/caregiver able to teach back the hierarchy of who to call/visit for symptoms/problems? PCP, Specialist, Home health nurse, Urgent Care, ED, 911  Yes   Is the patient/caregiver able to teach back signs and symptoms of worsening condition:  Fever/chills, Shortness of breath, Chest pain   Is the patient/caregiver able to teach back importance of completing antibiotic course of treatment?  Yes   Week 3 call completed?  Yes   Wrap up additional comments  States is improving-states some congestion today \"due to allergies\". Denies any fever or chest pain. States occasional " cough and fatigue. Denies any needs today.          Charlotte Collazo, RN

## 2020-09-22 ENCOUNTER — TELEPHONE (OUTPATIENT)
Dept: INTERNAL MEDICINE | Facility: CLINIC | Age: 65
End: 2020-09-22

## 2020-09-28 ENCOUNTER — READMISSION MANAGEMENT (OUTPATIENT)
Dept: CALL CENTER | Facility: HOSPITAL | Age: 65
End: 2020-09-28

## 2020-09-28 NOTE — OUTREACH NOTE
COPD/PN Week 4 Survey      Responses   [facility] patient discharged from?  [place]   COVID-19 Test Status  Confirmed   Does the patient have one of the following disease processes/diagnoses(primary or secondary)?  COPD/Pneumonia   Was the primary reason for admission:  Pneumonia   Week 4 attempt successful?  No          Eli Tirado LPN

## 2020-10-04 ENCOUNTER — PATIENT OUTREACH (OUTPATIENT)
Dept: CALL CENTER | Facility: HOSPITAL | Age: 65
End: 2020-10-04

## 2020-10-04 NOTE — OUTREACH NOTE
COVID-19 Plasma Donation Recruitment Note      Responses   Plasma Donation Outreach Call Successful?  Call was Unsuccessful - No Message Left          Mei Soto RN    10/4/2020, 17:32 CDT

## 2020-10-09 ENCOUNTER — OFFICE VISIT (OUTPATIENT)
Dept: INTERNAL MEDICINE | Facility: CLINIC | Age: 65
End: 2020-10-09

## 2020-10-09 DIAGNOSIS — Z86.16 HISTORY OF 2019 NOVEL CORONAVIRUS DISEASE (COVID-19): Primary | ICD-10-CM

## 2020-10-09 PROCEDURE — 99442 PR PHYS/QHP TELEPHONE EVALUATION 11-20 MIN: CPT | Performed by: NURSE PRACTITIONER

## 2020-10-09 RX ORDER — DICLOFENAC SODIUM 75 MG/1
75 TABLET, DELAYED RELEASE ORAL DAILY
COMMUNITY
End: 2020-10-28

## 2020-10-09 RX ORDER — ALBUTEROL SULFATE 90 UG/1
2 AEROSOL, METERED RESPIRATORY (INHALATION) EVERY 4 HOURS PRN
Qty: 18 G | Refills: 2 | Status: SHIPPED | OUTPATIENT
Start: 2020-10-09 | End: 2021-12-30

## 2020-10-09 NOTE — PROGRESS NOTES
Subjective   Livier Gutierrez is a 65 y.o. female    Chief Complaint   Patient presents with   • 4 week follow up   • Pneumonia and COVID     History of Present Illness     You have chosen to receive care through a telephone visit. Do you consent to use a telephone visit for your medical care today? YES     Livier Gutierrez is a 65 y.o. female  with past medical history of thyroid cancer, type 2 diabetes, hypertension, hyperlipidemia and sarcoidosis.  She presented to [facility] with worsening fatigue for the past 2 weeks.  She states that she also had a subjective fever at home and a cough for the past 2 weeks.  She complains of some chest tightness and pain in her upper back.  She states that she has had nasal congestion and postnasal drip for the past week and worsening cough with chest tightness and shortness of breath and wheezing along with nausea and diarrhea.  The patient's mother was admitted on 6A with COVID-19 pneumonia.  Due to worsening symptoms exposure to coronavirus she was admitted.  She was given therapy due to the fact that she showed evidence of hypoxia.     She completed Remdesivir treatment and received convalescent plasma on 8/28/2020.  She is on day 5 of Decadron and will receive 5 additional days upon discharge.  She was given DVT prophylaxis and will continue on aspirin upon discharge for continue prophylaxis.  Due to ongoing hypoxia and underlying sarcoidosis, patient will require home oxygen upon discharge which will be set up through case management.  Have recommended home oximeter upon discharge as well.     Pt states today that she is feeling much better since our last telehealth 9/11/2020     Last Sp02 was 97% this morning.      Pt states that she is 85% back to her baseline.  She requests a refill on her albuterol inhaler.  States she uses very rarely, but she does like to have it on hand.      She is having some allergy sx's, itchy/watery eyes and slight runny nose.  She  does have sacardosis, so she does have problems with dry eyes.      She has been released per ID.      The following portions of the patient's history were reviewed and updated as appropriate: allergies, current medications, past family history, past medical history, past social history, past surgical history and problem list.    Current Outpatient Medications:   •  diclofenac (VOLTAREN) 75 MG EC tablet, Take 75 mg by mouth Daily., Disp: , Rfl:   •  acetaminophen (TYLENOL) 325 MG tablet, Take 2 tablets by mouth Every 4 (Four) Hours As Needed for Mild Pain ., Disp: , Rfl:   •  albuterol sulfate HFA (Proventil HFA) 108 (90 Base) MCG/ACT inhaler, Inhale 2 puffs Every 4 (Four) Hours As Needed for Wheezing., Disp: 18 g, Rfl: 2  •  atenolol (TENORMIN) 25 MG tablet, TAKE ONE TABLET BY MOUTH DAILY, Disp: 90 tablet, Rfl: 0  •  BLACK CURRANT SEED OIL PO, Take  by mouth 2 (Two) Times a Day., Disp: , Rfl:   •  budesonide-formoterol (SYMBICORT) 80-4.5 MCG/ACT inhaler, Inhale 2 puffs 2 (Two) Times a Day., Disp: 1 inhaler, Rfl: 5  •  cholecalciferol (VITAMIN D3) 400 units tablet, Take 50 Units by mouth Daily., Disp: , Rfl:   •  Coenzyme Q10 (COQ10 PO), Take  by mouth As Needed., Disp: , Rfl:   •  glucose blood test strip, 1 po bid as directed- One touch ultra Blue 100, Disp: 100 each, Rfl: 12  •  glucose monitor monitoring kit, 1 each Daily., Disp: 1 each, Rfl: 0  •  Lancets misc, 1 each Daily., Disp: 60 each, Rfl: 11  •  levothyroxine (SYNTHROID, LEVOTHROID) 150 MCG tablet, Take 150 mcg by mouth daily., Disp: , Rfl:   •  losartan (COZAAR) 25 MG tablet, TAKE ONE TABLET BY MOUTH DAILY, Disp: 90 tablet, Rfl: 0  •  lovastatin (MEVACOR) 20 MG tablet, Take 1 tablet by mouth Every Night., Disp: 90 tablet, Rfl: 1  •  omeprazole (priLOSEC) 20 MG capsule, 1 PO BID with Diclofenac, Disp: 180 capsule, Rfl: 1  •  polyethyl glycol-propyl glycol (SYSTANE) 0.4-0.3 % solution ophthalmic solution, 1 drop As Needed., Disp: , Rfl:   •   triamterene-hydrochlorothiazide (DYAZIDE) 37.5-25 MG per capsule, , Disp: , Rfl:   •  TURMERIC PO, Take 3,000 mg by mouth Daily., Disp: , Rfl:   •  vitamin B-12 (CYANOCOBALAMIN) 1000 MCG tablet, Take 1,000 mcg by mouth 2 (Two) Times a Day., Disp: , Rfl:   •  vitamin C (ASCORBIC ACID) 500 MG tablet, Take 2,000 mg by mouth Daily., Disp: , Rfl:      Review of Systems   Constitutional: Negative for chills, fatigue and fever.   Respiratory: Positive for shortness of breath. Negative for cough and chest tightness. Choking: mild/much improved.    Cardiovascular: Negative for chest pain.   Gastrointestinal: Negative for abdominal pain, diarrhea, nausea and vomiting.   Endocrine: Negative for cold intolerance and heat intolerance.   Musculoskeletal: Negative for arthralgias.   Neurological: Negative for dizziness.       Objective   Physical Exam  Neurological:      Mental Status: She is oriented to person, place, and time.   Psychiatric:         Mood and Affect: Mood normal.         Behavior: Behavior normal.         Thought Content: Thought content normal.         Judgment: Judgment normal.       There were no vitals filed for this visit.      Assessment/Plan   Livier was seen today for 4 week follow up and pneumonia and covid.    Diagnoses and all orders for this visit:    History of 2019 novel coronavirus disease (COVID-19)  -     albuterol sulfate HFA (Proventil HFA) 108 (90 Base) MCG/ACT inhaler; Inhale 2 puffs Every 4 (Four) Hours As Needed for Wheezing.      Albuterol refilled  No changes  Return for Next scheduled follow up.    This visit has been rescheduled as a phone visit to comply with patient safety concerns in accordance with CDC recommendations. Total time of discussion was 11 minutes.

## 2020-10-29 RX ORDER — DICLOFENAC SODIUM 75 MG/1
TABLET, DELAYED RELEASE ORAL
Qty: 180 TABLET | Refills: 1 | Status: SHIPPED | OUTPATIENT
Start: 2020-10-29 | End: 2021-04-29

## 2020-11-17 ENCOUNTER — OFFICE VISIT (OUTPATIENT)
Dept: INTERNAL MEDICINE | Facility: CLINIC | Age: 65
End: 2020-11-17

## 2020-11-17 ENCOUNTER — LAB REQUISITION (OUTPATIENT)
Dept: LAB | Facility: HOSPITAL | Age: 65
End: 2020-11-17

## 2020-11-17 VITALS
HEART RATE: 68 BPM | TEMPERATURE: 97.1 F | RESPIRATION RATE: 16 BRPM | OXYGEN SATURATION: 96 % | SYSTOLIC BLOOD PRESSURE: 126 MMHG | WEIGHT: 234.6 LBS | HEIGHT: 67 IN | DIASTOLIC BLOOD PRESSURE: 82 MMHG | BODY MASS INDEX: 36.82 KG/M2

## 2020-11-17 DIAGNOSIS — E11.9 TYPE 2 DIABETES MELLITUS WITHOUT COMPLICATION, WITHOUT LONG-TERM CURRENT USE OF INSULIN (HCC): Primary | ICD-10-CM

## 2020-11-17 DIAGNOSIS — Z85.850 HISTORY OF THYROID CANCER: ICD-10-CM

## 2020-11-17 DIAGNOSIS — M17.0 PRIMARY OSTEOARTHRITIS OF BOTH KNEES: ICD-10-CM

## 2020-11-17 DIAGNOSIS — E55.9 VITAMIN D DEFICIENCY: ICD-10-CM

## 2020-11-17 DIAGNOSIS — M25.562 ARTHRALGIA OF BOTH KNEES: ICD-10-CM

## 2020-11-17 DIAGNOSIS — E78.5 DYSLIPIDEMIA: ICD-10-CM

## 2020-11-17 DIAGNOSIS — D86.9 SARCOIDOSIS: ICD-10-CM

## 2020-11-17 DIAGNOSIS — I10 ESSENTIAL HYPERTENSION: ICD-10-CM

## 2020-11-17 DIAGNOSIS — M25.561 ARTHRALGIA OF BOTH KNEES: ICD-10-CM

## 2020-11-17 DIAGNOSIS — Z00.00 ROUTINE GENERAL MEDICAL EXAMINATION AT A HEALTH CARE FACILITY: ICD-10-CM

## 2020-11-17 DIAGNOSIS — Z23 NEED FOR INFLUENZA VACCINATION: ICD-10-CM

## 2020-11-17 LAB — HBA1C MFR BLD: 5.5 %

## 2020-11-17 PROCEDURE — 36415 COLL VENOUS BLD VENIPUNCTURE: CPT | Performed by: NURSE PRACTITIONER

## 2020-11-17 PROCEDURE — 99214 OFFICE O/P EST MOD 30 MIN: CPT | Performed by: NURSE PRACTITIONER

## 2020-11-17 PROCEDURE — G0008 ADMIN INFLUENZA VIRUS VAC: HCPCS | Performed by: NURSE PRACTITIONER

## 2020-11-17 PROCEDURE — 90694 VACC AIIV4 NO PRSRV 0.5ML IM: CPT | Performed by: NURSE PRACTITIONER

## 2020-11-17 PROCEDURE — 83036 HEMOGLOBIN GLYCOSYLATED A1C: CPT | Performed by: NURSE PRACTITIONER

## 2020-11-17 NOTE — PROGRESS NOTES
Subjective   Livier Gutierrez is a 65 y.o. female    Chief Complaint   Patient presents with   • 6 month follow up   • Diabetes   • Hypertension   • Hyperlipidemia   • Heartburn     History of Present Illness     Pt was dx'd with COVID-19 in August.  She was admitted for 5 days.  She did receive 02 via NC, was treated with convalescent plasma, remdesivir and steroids.  She has recovered and doing well.  Her 97 yo mother had it at the same time and is doing well also.    DM II - does not ck FS @ home; Has been off of Metformin for 6+ months now.  A1C today is 5.5%.     Has seen podiatry in the past for BLE N/T.  An EMG of her LE's was ordered for BLE pain, N/T.  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  She was started on Gabapentin, but she did not tolerate, so she stopped this.  States that sx's are stable/improved since her knee replacements.        HTN - chronic and stable on Cozaar, Atenolol and HCTZ.  Denies medication SE's.  BP looks great today!      HL - she stopped the lovastatin due to myalgias.  She is fasting for labs today.  Lab Results   Component Value Date    CHOL 169 01/30/2018    CHLPL 175 02/18/2020    TRIG 112 02/18/2020    HDL 41 02/18/2020     (H) 02/18/2020          H/O thyroid CA - managed by oncologist at  currently has her on 150mcg; Let her go to the [facility]. She is now seeing her every 6 months.        GERD is chronic and stable on Omeprazole, but only takes it PRN      Allergies are doing ok. Taking Claritin everyday, but likes to have an albuterol inhaler on hand for wheezing/SOA.       Bilateral knee pain - Livier underwent bilateral knee replacements since her last visit per Dr. Bruno; right 4/29/19 and left 3/12/2019     Pt has been helping out with her sister a great deal who recently had a CVA.  She is doing a lot of lifting, pulling and tugging that she is not used to.  She is having BUE pain with some N/T at times.  Mostly in the biceps, but does  radiate down to her hands.  She states that she has previously been dx'd with CTS and has splints, but she is not wearing them.  She has a great deal of diffuse joint pain as well.       Flu shot - updating today  Pneumovax 10/13, 2/1/2019  Zostavax, declines  Prevnar - 4/19/16  Tdap 2012   DEXA - 3/6/2019  Ovarian Ca screening; [facility]   Mamm 12/2019  Pap - 7/2014; no longer doing ([facility])  Colon 2016 ([facility]); repeating in 1 year this time, he found some mild crohns - she called is office and since she is sx free, he is going to hold off on a colon for now.    Eye exam - 1/2020 at   Foot exam 1/2020 per podiatry - goes Q8-9 weeks for nail trim and ck    The following portions of the patient's history were reviewed and updated as appropriate: allergies, current medications, past family history, past medical history, past social history, past surgical history and problem list.    Current Outpatient Medications:   •  acetaminophen (TYLENOL) 325 MG tablet, Take 2 tablets by mouth Every 4 (Four) Hours As Needed for Mild Pain ., Disp: , Rfl:   •  albuterol sulfate HFA (Proventil HFA) 108 (90 Base) MCG/ACT inhaler, Inhale 2 puffs Every 4 (Four) Hours As Needed for Wheezing., Disp: 18 g, Rfl: 2  •  atenolol (TENORMIN) 25 MG tablet, TAKE ONE TABLET BY MOUTH DAILY, Disp: 90 tablet, Rfl: 0  •  BLACK CURRANT SEED OIL PO, Take  by mouth 2 (Two) Times a Day., Disp: , Rfl:   •  budesonide-formoterol (SYMBICORT) 80-4.5 MCG/ACT inhaler, Inhale 2 puffs 2 (Two) Times a Day., Disp: 1 inhaler, Rfl: 5  •  cholecalciferol (VITAMIN D3) 400 units tablet, Take 50 Units by mouth Daily., Disp: , Rfl:   •  Coenzyme Q10 (COQ10 PO), Take  by mouth As Needed., Disp: , Rfl:   •  diclofenac (VOLTAREN) 75 MG EC tablet, TAKE ONE TABLET BY MOUTH TWICE A DAY AS NEEDED FOR PAIN, Disp: 180 tablet, Rfl: 1  •  glucose blood test strip, 1 po bid as directed- One touch ultra Blue 100, Disp: 100 each, Rfl: 12  •  glucose monitor monitoring kit, 1 each Daily.,  Disp: 1 each, Rfl: 0  •  Lancets misc, 1 each Daily., Disp: 60 each, Rfl: 11  •  levothyroxine (SYNTHROID, LEVOTHROID) 150 MCG tablet, Take 150 mcg by mouth daily., Disp: , Rfl:   •  losartan (COZAAR) 25 MG tablet, TAKE ONE TABLET BY MOUTH DAILY, Disp: 90 tablet, Rfl: 0  •  lovastatin (MEVACOR) 20 MG tablet, Take 1 tablet by mouth Every Night., Disp: 90 tablet, Rfl: 1  •  omeprazole (priLOSEC) 20 MG capsule, 1 PO BID with Diclofenac, Disp: 180 capsule, Rfl: 1  •  polyethyl glycol-propyl glycol (SYSTANE) 0.4-0.3 % solution ophthalmic solution, 1 drop As Needed., Disp: , Rfl:   •  triamterene-hydrochlorothiazide (DYAZIDE) 37.5-25 MG per capsule, , Disp: , Rfl:   •  TURMERIC PO, Take 3,000 mg by mouth Daily., Disp: , Rfl:   •  vitamin B-12 (CYANOCOBALAMIN) 1000 MCG tablet, Take 1,000 mcg by mouth 2 (Two) Times a Day., Disp: , Rfl:   •  vitamin C (ASCORBIC ACID) 500 MG tablet, Take 2,000 mg by mouth Daily., Disp: , Rfl:      Review of Systems   Constitutional: Positive for fatigue. Negative for chills and fever.   Respiratory: Negative for cough, chest tightness and shortness of breath.    Cardiovascular: Negative for chest pain.   Gastrointestinal: Negative for abdominal pain, diarrhea, nausea and vomiting.   Endocrine: Negative for cold intolerance and heat intolerance.   Musculoskeletal: Positive for arthralgias, gait problem, joint swelling and myalgias.   Neurological: Negative for dizziness.       Objective   Physical Exam  Constitutional:       Appearance: She is well-developed.   HENT:      Head: Normocephalic and atraumatic.   Eyes:      Conjunctiva/sclera: Conjunctivae normal.      Pupils: Pupils are equal, round, and reactive to light.   Neck:      Musculoskeletal: Normal range of motion.   Cardiovascular:      Rate and Rhythm: Normal rate and regular rhythm.      Heart sounds: Normal heart sounds.   Pulmonary:      Effort: Pulmonary effort is normal.      Breath sounds: Normal breath sounds.   Abdominal:      " General: Bowel sounds are normal.      Palpations: Abdomen is soft.   Musculoskeletal: Normal range of motion.   Skin:     General: Skin is warm and dry.   Neurological:      Mental Status: She is alert and oriented to person, place, and time.      Deep Tendon Reflexes: Reflexes are normal and symmetric.   Psychiatric:         Behavior: Behavior normal.         Thought Content: Thought content normal.         Judgment: Judgment normal.       Vitals:    11/17/20 0927   BP: 126/82   Pulse: 68   Resp: 16   Temp: 97.1 °F (36.2 °C)   TempSrc: Infrared   SpO2: 96%   Weight: 106 kg (234 lb 9.6 oz)   Height: 170.2 cm (67.01\")         Assessment/Plan   Diagnoses and all orders for this visit:    1. Type 2 diabetes mellitus without complication, without long-term current use of insulin (CMS/[facility]ly [facility]) (Primary)  -     POC Glycosylated Hemoglobin (Hb A1C)  -     CBC & Differential; Future  -     Comprehensive Metabolic Panel; Future  -     Lipid Panel; Future  -     TSH; Future  -     Vitamin B12; Future  -     Vitamin D 25 Hydroxy; Future  -     C-reactive Protein; Future  -     Sedimentation Rate; Future  -     Uric Acid; Future  -     LIBERTAD; Future  -     Rheumatoid Factor, Quant; Future    2. Essential hypertension  -     CBC & Differential; Future  -     Comprehensive Metabolic Panel; Future  -     Lipid Panel; Future  -     TSH; Future  -     Vitamin B12; Future  -     Vitamin D 25 Hydroxy; Future  -     C-reactive Protein; Future  -     Sedimentation Rate; Future  -     Uric Acid; Future  -     LIBERTAD; Future  -     Rheumatoid Factor, Quant; Future    3. Arthralgia of both knees  -     CBC & Differential; Future  -     Comprehensive Metabolic Panel; Future  -     Lipid Panel; Future  -     TSH; Future  -     Vitamin B12; Future  -     Vitamin D 25 Hydroxy; Future  -     C-reactive Protein; Future  -     Sedimentation Rate; Future  -     Uric Acid; Future  -     LIBERTAD; Future  -     Rheumatoid Factor, Quant; Future    4. Primary " osteoarthritis of both knees  -     CBC & Differential; Future  -     Comprehensive Metabolic Panel; Future  -     Lipid Panel; Future  -     TSH; Future  -     Vitamin B12; Future  -     Vitamin D 25 Hydroxy; Future  -     C-reactive Protein; Future  -     Sedimentation Rate; Future  -     Uric Acid; Future  -     LIBERTAD; Future  -     Rheumatoid Factor, Quant; Future    5. Dyslipidemia  -     CBC & Differential; Future  -     Comprehensive Metabolic Panel; Future  -     Lipid Panel; Future  -     TSH; Future  -     Vitamin B12; Future  -     Vitamin D 25 Hydroxy; Future  -     C-reactive Protein; Future  -     Sedimentation Rate; Future  -     Uric Acid; Future  -     LIBERTAD; Future  -     Rheumatoid Factor, Quant; Future    6. Sarcoidosis  -     CBC & Differential; Future  -     Comprehensive Metabolic Panel; Future  -     Lipid Panel; Future  -     TSH; Future  -     Vitamin B12; Future  -     Vitamin D 25 Hydroxy; Future  -     C-reactive Protein; Future  -     Sedimentation Rate; Future  -     Uric Acid; Future  -     LIBERTAD; Future  -     Rheumatoid Factor, Quant; Future    7. History of thyroid cancer  -     CBC & Differential; Future  -     Comprehensive Metabolic Panel; Future  -     Lipid Panel; Future  -     TSH; Future  -     Vitamin B12; Future  -     Vitamin D 25 Hydroxy; Future  -     C-reactive Protein; Future  -     Sedimentation Rate; Future  -     Uric Acid; Future  -     LIBERTAD; Future  -     Rheumatoid Factor, Quant; Future    8. Vitamin D deficiency  -     CBC & Differential; Future  -     Comprehensive Metabolic Panel; Future  -     Lipid Panel; Future  -     TSH; Future  -     Vitamin B12; Future  -     Vitamin D 25 Hydroxy; Future  -     C-reactive Protein; Future  -     Sedimentation Rate; Future  -     Uric Acid; Future  -     LIBERTAD; Future  -     Rheumatoid Factor, Quant; Future    9. Need for influenza vaccination  -     Fluad Quad 65+ yrs ([id number])      Labs sent  Will ck additional labs in r/t  joint pain  No refills needed  Flu shot updated  Return in about 6 months (around 5/17/2021) for Medicare Wellness.

## 2020-11-18 LAB
25(OH)D3+25(OH)D2 SERPL-MCNC: 43.2 NG/ML (ref 30–100)
ALBUMIN SERPL-MCNC: 3.8 G/DL (ref 3.5–5.2)
ALBUMIN/GLOB SERPL: 1.4 G/DL
ALP SERPL-CCNC: 43 U/L (ref 39–117)
ALT SERPL-CCNC: 13 U/L (ref 1–33)
ANA SER QL: NEGATIVE
AST SERPL-CCNC: 13 U/L (ref 1–32)
BASOPHILS # BLD AUTO: 0.05 10*3/MM3 (ref 0–0.2)
BASOPHILS NFR BLD AUTO: 0.8 % (ref 0–1.5)
BILIRUB SERPL-MCNC: 0.3 MG/DL (ref 0–1.2)
BUN SERPL-MCNC: 20 MG/DL (ref 8–23)
BUN/CREAT SERPL: 19.6 (ref 7–25)
CALCIUM SERPL-MCNC: 8.2 MG/DL (ref 8.6–10.5)
CHLORIDE SERPL-SCNC: 101 MMOL/L (ref 98–107)
CHOLEST SERPL-MCNC: 195 MG/DL (ref 0–200)
CO2 SERPL-SCNC: 30.4 MMOL/L (ref 22–29)
CREAT SERPL-MCNC: 1.02 MG/DL (ref 0.57–1)
CRP SERPL-MCNC: 0.76 MG/DL (ref 0–0.5)
EOSINOPHIL # BLD AUTO: 0.25 10*3/MM3 (ref 0–0.4)
EOSINOPHIL NFR BLD AUTO: 4.2 % (ref 0.3–6.2)
ERYTHROCYTE [DISTWIDTH] IN BLOOD BY AUTOMATED COUNT: 16.7 % (ref 12.3–15.4)
ERYTHROCYTE [SEDIMENTATION RATE] IN BLOOD BY WESTERGREN METHOD: 15 MM/HR (ref 0–30)
GLOBULIN SER CALC-MCNC: 2.7 GM/DL
GLUCOSE SERPL-MCNC: 92 MG/DL (ref 65–99)
HCT VFR BLD AUTO: 36.2 % (ref 34–46.6)
HDLC SERPL-MCNC: 41 MG/DL (ref 40–60)
HGB BLD-MCNC: 11.6 G/DL (ref 12–15.9)
IMM GRANULOCYTES # BLD AUTO: 0.02 10*3/MM3 (ref 0–0.05)
IMM GRANULOCYTES NFR BLD AUTO: 0.3 % (ref 0–0.5)
LDLC SERPL CALC-MCNC: 124 MG/DL (ref 0–100)
LYMPHOCYTES # BLD AUTO: 1.31 10*3/MM3 (ref 0.7–3.1)
LYMPHOCYTES NFR BLD AUTO: 22.2 % (ref 19.6–45.3)
MCH RBC QN AUTO: 25.2 PG (ref 26.6–33)
MCHC RBC AUTO-ENTMCNC: 32 G/DL (ref 31.5–35.7)
MCV RBC AUTO: 78.5 FL (ref 79–97)
MONOCYTES # BLD AUTO: 0.59 10*3/MM3 (ref 0.1–0.9)
MONOCYTES NFR BLD AUTO: 10 % (ref 5–12)
NEUTROPHILS # BLD AUTO: 3.69 10*3/MM3 (ref 1.7–7)
NEUTROPHILS NFR BLD AUTO: 62.5 % (ref 42.7–76)
NRBC BLD AUTO-RTO: 0 /100 WBC (ref 0–0.2)
PLATELET # BLD AUTO: 294 10*3/MM3 (ref 140–450)
POTASSIUM SERPL-SCNC: 4.2 MMOL/L (ref 3.5–5.2)
PROT SERPL-MCNC: 6.5 G/DL (ref 6–8.5)
RBC # BLD AUTO: 4.61 10*6/MM3 (ref 3.77–5.28)
RHEUMATOID FACT SERPL-ACNC: <10 IU/ML (ref 0–13.9)
SODIUM SERPL-SCNC: 142 MMOL/L (ref 136–145)
TRIGL SERPL-MCNC: 168 MG/DL (ref 0–150)
TSH SERPL DL<=0.005 MIU/L-ACNC: 0.44 UIU/ML (ref 0.27–4.2)
URATE SERPL-MCNC: 6 MG/DL (ref 2.4–5.7)
VIT B12 SERPL-MCNC: >2000 PG/ML (ref 211–946)
VLDLC SERPL CALC-MCNC: 30 MG/DL (ref 5–40)
WBC # BLD AUTO: 5.91 10*3/MM3 (ref 3.4–10.8)

## 2020-11-24 ENCOUNTER — TELEPHONE (OUTPATIENT)
Dept: INTERNAL MEDICINE | Facility: CLINIC | Age: 65
End: 2020-11-24

## 2020-11-24 DIAGNOSIS — E83.51 HYPOCALCEMIA: Primary | ICD-10-CM

## 2020-11-24 RX ORDER — EZETIMIBE 10 MG/1
10 TABLET ORAL DAILY
Qty: 90 TABLET | Refills: 1 | Status: SHIPPED | OUTPATIENT
Start: 2020-11-24 | End: 2021-05-25

## 2020-12-01 ENCOUNTER — TELEPHONE (OUTPATIENT)
Dept: INTERNAL MEDICINE | Facility: CLINIC | Age: 65
End: 2020-12-01

## 2020-12-08 ENCOUNTER — TELEPHONE (OUTPATIENT)
Dept: INTERNAL MEDICINE | Facility: CLINIC | Age: 65
End: 2020-12-08

## 2020-12-10 NOTE — TELEPHONE ENCOUNTER
LVM for the patient to return our call, office number was provided  I have not found a form for the shoes, she would need to call back and let us know if this needs to just be a written order or if she has a form that needs to be completed.

## 2020-12-15 ENCOUNTER — TELEPHONE (OUTPATIENT)
Dept: INTERNAL MEDICINE | Facility: CLINIC | Age: 65
End: 2020-12-15

## 2020-12-15 DIAGNOSIS — I10 ESSENTIAL HYPERTENSION: ICD-10-CM

## 2020-12-15 RX ORDER — ATENOLOL 25 MG/1
TABLET ORAL
Qty: 90 TABLET | Refills: 1 | Status: SHIPPED | OUTPATIENT
Start: 2020-12-15 | End: 2021-06-11

## 2020-12-15 NOTE — TELEPHONE ENCOUNTER
Called [facility] to ask them to refax the form they are requesting for patient to obtain diabetic shoes. [phone]

## 2020-12-16 NOTE — TELEPHONE ENCOUNTER
[facility] should be faxing over the form needed to be filled out for her diabetic shoes. It looks like  called them yesterday and requested this to be refaxed to our office.

## 2020-12-28 NOTE — TELEPHONE ENCOUNTER
PT IS REQUESTING THAT A MEDICAL  SIGN OFF FOR HER DIABETIC SHOES    PT STATES THAT THIS WAS ALREADY SUPPOSE TO BEEN DONE.    [facility]      PLEASE ADVISE  [phone]       Routing to MD to advise.

## 2021-01-12 RX ORDER — LOSARTAN POTASSIUM 25 MG/1
TABLET ORAL
Qty: 90 TABLET | Refills: 1 | Status: SHIPPED | OUTPATIENT
Start: 2021-01-12 | End: 2021-07-20

## 2021-03-30 ENCOUNTER — PRIOR AUTHORIZATION (OUTPATIENT)
Dept: GASTROENTEROLOGY | Facility: CLINIC | Age: 66
End: 2021-03-30

## 2021-03-30 RX ORDER — SODIUM, POTASSIUM,MAG SULFATES 17.5-3.13G
2 SOLUTION, RECONSTITUTED, ORAL ORAL TAKE AS DIRECTED
Qty: 354 ML | Refills: 0 | Status: SHIPPED | OUTPATIENT
Start: 2021-03-30 | End: 2021-05-25

## 2021-04-04 ENCOUNTER — APPOINTMENT (OUTPATIENT)
Dept: PREADMISSION TESTING | Facility: HOSPITAL | Age: 66
End: 2021-04-04

## 2021-04-04 PROCEDURE — C9803 HOPD COVID-19 SPEC COLLECT: HCPCS

## 2021-04-04 PROCEDURE — U0004 COV-19 TEST NON-CDC HGH THRU: HCPCS

## 2021-04-05 LAB — SARS-COV-2 RNA NOSE QL NAA+PROBE: NOT DETECTED

## 2021-04-07 ENCOUNTER — OUTSIDE FACILITY SERVICE (OUTPATIENT)
Dept: GASTROENTEROLOGY | Facility: CLINIC | Age: 66
End: 2021-04-07

## 2021-04-07 PROCEDURE — 88305 TISSUE EXAM BY PATHOLOGIST: CPT | Performed by: INTERNAL MEDICINE

## 2021-04-07 PROCEDURE — 45380 COLONOSCOPY AND BIOPSY: CPT | Performed by: INTERNAL MEDICINE

## 2021-04-08 ENCOUNTER — LAB REQUISITION (OUTPATIENT)
Dept: LAB | Facility: HOSPITAL | Age: 66
End: 2021-04-08

## 2021-04-08 DIAGNOSIS — Z86.010 PERSONAL HISTORY OF COLONIC POLYPS: ICD-10-CM

## 2021-04-09 LAB
CYTO UR: NORMAL
LAB AP CASE REPORT: NORMAL
LAB AP CLINICAL INFORMATION: NORMAL
PATH REPORT.FINAL DX SPEC: NORMAL
PATH REPORT.GROSS SPEC: NORMAL

## 2021-04-10 DIAGNOSIS — K21.9 GASTROESOPHAGEAL REFLUX DISEASE: ICD-10-CM

## 2021-04-12 RX ORDER — OMEPRAZOLE 20 MG/1
CAPSULE, DELAYED RELEASE ORAL
Qty: 180 CAPSULE | Refills: 1 | Status: SHIPPED | OUTPATIENT
Start: 2021-04-12 | End: 2021-11-23 | Stop reason: SDUPTHER

## 2021-04-28 NOTE — TELEPHONE ENCOUNTER
Last Office Visit: 11/17/2020  Next Office Visit: 5/25/2021    Labs completed in past 6 months? yes  Labs completed in past year? yes    Medication: diclofenac   Last Refill Date: 10/29/2020  Quantity: 180   Refills: 1    Pharmacy: Pharmacy:  [facility] [address] [place] [address] [address] [place] [address] [phone] [facility] [phone] FX    Please review pended refill request for any changes needed on refills or quantities. Thank you!

## 2021-04-29 RX ORDER — DICLOFENAC SODIUM 75 MG/1
TABLET, DELAYED RELEASE ORAL
Qty: 180 TABLET | Refills: 0 | Status: SHIPPED | OUTPATIENT
Start: 2021-04-29 | End: 2021-08-02

## 2021-05-11 ENCOUNTER — OFFICE VISIT (OUTPATIENT)
Dept: GASTROENTEROLOGY | Facility: CLINIC | Age: 66
End: 2021-05-11

## 2021-05-11 VITALS
HEART RATE: 82 BPM | WEIGHT: 237 LBS | DIASTOLIC BLOOD PRESSURE: 75 MMHG | SYSTOLIC BLOOD PRESSURE: 145 MMHG | BODY MASS INDEX: 37.11 KG/M2

## 2021-05-11 DIAGNOSIS — K52.9 COLITIS: Primary | ICD-10-CM

## 2021-05-11 PROCEDURE — 99213 OFFICE O/P EST LOW 20 MIN: CPT | Performed by: INTERNAL MEDICINE

## 2021-05-11 NOTE — PROGRESS NOTES
PCP:  Tracey Smith APRN     No referring provider defined for this encounter.    Chief Complaint   Patient presents with   • Follow-up        HPI   Patient is known to me.  A 65-year-old female who had a recent colonoscopy on 4/7/2021.  At that time she had some minimal granularity and subtle change in vascular pattern in the ascending colon.  Multiple biopsies were taken.  The biopsy showed an acute colitis but no chronic colitis was detected.  There was some chronic crypt architectural distortion.  She has some very focal changes on previous colonoscopies.  Interestingly, her mother and her sister have Crohn's disease.  Her mother has no symptoms at all and is on no treatment.  Her sister has symptoms and is on a biologic.  The patient has no symptoms and tends towards constipation.  She had Covid last August.    Allergies   Allergen Reactions   • Latex Angioedema   • Penicillins Swelling          Current Outpatient Medications:   •  acetaminophen (TYLENOL) 325 MG tablet, Take 2 tablets by mouth Every 4 (Four) Hours As Needed for Mild Pain ., Disp: , Rfl:   •  albuterol sulfate HFA (Proventil HFA) 108 (90 Base) MCG/ACT inhaler, Inhale 2 puffs Every 4 (Four) Hours As Needed for Wheezing., Disp: 18 g, Rfl: 2  •  atenolol (TENORMIN) 25 MG tablet, TAKE ONE TABLET BY MOUTH DAILY, Disp: 90 tablet, Rfl: 1  •  BLACK CURRANT SEED OIL PO, Take  by mouth 2 (Two) Times a Day., Disp: , Rfl:   •  budesonide-formoterol (SYMBICORT) 80-4.5 MCG/ACT inhaler, Inhale 2 puffs 2 (Two) Times a Day., Disp: 1 inhaler, Rfl: 5  •  cholecalciferol (VITAMIN D3) 400 units tablet, Take 50 Units by mouth Daily., Disp: , Rfl:   •  Coenzyme Q10 (COQ10 PO), Take  by mouth As Needed., Disp: , Rfl:   •  diclofenac (VOLTAREN) 75 MG EC tablet, TAKE ONE TABLET BY MOUTH TWICE A DAY AS NEEDED FOR PAIN, Disp: 180 tablet, Rfl: 0  •  ezetimibe (Zetia) 10 MG tablet, Take 1 tablet by mouth Daily., Disp: 90 tablet, Rfl: 1  •  glucose blood test strip, 1  po bid as directed- One touch ultra Blue 100, Disp: 100 each, Rfl: 12  •  glucose monitor monitoring kit, 1 each Daily., Disp: 1 each, Rfl: 0  •  Lancets misc, 1 each Daily., Disp: 60 each, Rfl: 11  •  levothyroxine (SYNTHROID, LEVOTHROID) 150 MCG tablet, Take 150 mcg by mouth daily., Disp: , Rfl:   •  losartan (COZAAR) 25 MG tablet, TAKE ONE TABLET BY MOUTH DAILY, Disp: 90 tablet, Rfl: 1  •  omeprazole (priLOSEC) 20 MG capsule, TAKE ONE CAPSULE BY MOUTH TWICE A DAY WITH DICLOFENAC, Disp: 180 capsule, Rfl: 1  •  polyethyl glycol-propyl glycol (SYSTANE) 0.4-0.3 % solution ophthalmic solution, 1 drop As Needed., Disp: , Rfl:   •  sodium-potassium-magnesium sulfates (Suprep Bowel Prep Kit) 17.5-3.13-1.6 GM/177ML solution oral solution, Take 2 bottles by mouth Take As Directed., Disp: 354 mL, Rfl: 0  •  triamterene-hydrochlorothiazide (DYAZIDE) 37.5-25 MG per capsule, , Disp: , Rfl:   •  TURMERIC PO, Take 3,000 mg by mouth Daily., Disp: , Rfl:   •  vitamin B-12 (CYANOCOBALAMIN) 1000 MCG tablet, Take 1,000 mcg by mouth 2 (Two) Times a Day., Disp: , Rfl:   •  vitamin C (ASCORBIC ACID) 500 MG tablet, Take 2,000 mg by mouth Daily., Disp: , Rfl:      Past Medical History:   Diagnosis Date   • Allergic    • Diabetes mellitus (CMS/HCC)    • H/O mammogram 12/23/2019, 12/20/2018, 12/19/2017   • History of bone density study 03/06/2019   • History of thyroid cancer    • Hyperlipidemia    • Hypertension    • Joint pain    • Pap smear for cervical cancer screening 07/14/2014   • Sarcoidosis        Past Surgical History:   Procedure Laterality Date   • COLONOSCOPY  04/2016    Due 4/2021   • OOPHORECTOMY Bilateral 1995   • REPLACEMENT TOTAL KNEE BILATERAL      4/29/2019 was right knee and 3/12/2019 was left knee   • THYROIDECTOMY  2005   • TOTAL ABDOMINAL HYSTERECTOMY  1995        Social History     Socioeconomic History   • Marital status:      Spouse name: Not on file   • Number of children: Not on file   • Years of  education: Not on file   • Highest education level: Not on file   Tobacco Use   • Smoking status: Former Smoker   • Smokeless tobacco: Never Used   • Tobacco comment: quit about 32 years ago   Substance and Sexual Activity   • Alcohol use: No   • Drug use: No   • Sexual activity: Defer        Family History   Problem Relation Age of Onset   • Crohn's disease Mother    • Breast cancer Mother 85   • Diabetes Father    • Heart failure Father         congestive   • Crohn's disease Sister    • Thyroid cancer Sister    • Thyroid cancer Sister    • Diabetes Sister    • Ovarian cancer Neg Hx         Review of Systems   Constitutional: Negative.    HENT: Negative for trouble swallowing and voice change.    Gastrointestinal: Positive for constipation.        Vitals:    05/11/21 1535   BP: 145/75   Pulse: 82        Physical Exam   General Appearance: Alert, in no acute distress   Head: Normocephalic, without obvious abnormality, atraumatic   Eyes: Lids and lashes normal, conjunctivae and sclerae normal, no icterus, no pallor, corneas clear, PERRLA   Ears: Ears appear intact with no abnormalities noted   Extremities: Moves all extremities well, no edema, no cyanosis, no redness   Skin: No bleeding, bruising or rash   Neurologic: Cranial nerves 2 - 12 grossly intact, no focal deficits     Review of systems was reviewed and positives are noted. All of the remaining review of systems in that system are negative.    Diagnoses and all orders for this visit:    1. Colitis (Primary)    Impressions and plan #1 mild acute inflammation in the right colon: She has had this in the past.  Her colonoscopy 5 years ago had similar findings.Biopsies at that time showed some active colitis.  She is done exceptionally well with conservative therapy at the moment.  We talked about the options at length.  Given the fact that she is really asymptomatic other than some constipation she would prefer to hold off on any active treatment at the moment.   Her mother is never really required treatment and is in a similar situation.  She certainly does not want to go on immunosuppressants at this time.  One could consider something such as mesalamine.  She does not have any diarrhea or bleeding.  She does not have any significant pain.  She does have some constipation.  I have occasionally had a patient with small bowel disease have constipation so we could always check a small bowel follow-through.  She prefers to go more conservative for now which I think is reasonable.  I will see her back in 6 months although she will call us with any increasing symptoms or changing symptoms.    Randy Oliver MD

## 2021-05-25 ENCOUNTER — TELEPHONE (OUTPATIENT)
Dept: INTERNAL MEDICINE | Facility: CLINIC | Age: 66
End: 2021-05-25

## 2021-05-25 ENCOUNTER — LAB (OUTPATIENT)
Dept: LAB | Facility: HOSPITAL | Age: 66
End: 2021-05-25

## 2021-05-25 ENCOUNTER — OFFICE VISIT (OUTPATIENT)
Dept: INTERNAL MEDICINE | Facility: CLINIC | Age: 66
End: 2021-05-25

## 2021-05-25 VITALS
HEIGHT: 67 IN | OXYGEN SATURATION: 97 % | HEART RATE: 66 BPM | RESPIRATION RATE: 16 BRPM | DIASTOLIC BLOOD PRESSURE: 62 MMHG | BODY MASS INDEX: 36.88 KG/M2 | SYSTOLIC BLOOD PRESSURE: 124 MMHG | WEIGHT: 235 LBS | TEMPERATURE: 96.8 F

## 2021-05-25 DIAGNOSIS — Z00.00 ROUTINE GENERAL MEDICAL EXAMINATION AT A HEALTH CARE FACILITY: ICD-10-CM

## 2021-05-25 DIAGNOSIS — I10 ESSENTIAL HYPERTENSION: ICD-10-CM

## 2021-05-25 DIAGNOSIS — E11.9 TYPE 2 DIABETES MELLITUS WITHOUT COMPLICATION, WITHOUT LONG-TERM CURRENT USE OF INSULIN (HCC): ICD-10-CM

## 2021-05-25 DIAGNOSIS — Z85.850 HISTORY OF THYROID CANCER: ICD-10-CM

## 2021-05-25 DIAGNOSIS — E55.9 VITAMIN D DEFICIENCY: ICD-10-CM

## 2021-05-25 DIAGNOSIS — Z00.00 MEDICARE ANNUAL WELLNESS VISIT, SUBSEQUENT: Primary | ICD-10-CM

## 2021-05-25 DIAGNOSIS — Z78.0 POST-MENOPAUSAL: ICD-10-CM

## 2021-05-25 DIAGNOSIS — E78.5 DYSLIPIDEMIA: ICD-10-CM

## 2021-05-25 DIAGNOSIS — E83.51 HYPOCALCEMIA: ICD-10-CM

## 2021-05-25 DIAGNOSIS — E53.8 B12 DEFICIENCY: ICD-10-CM

## 2021-05-25 PROBLEM — R94.31 ABNORMAL EKG: Status: RESOLVED | Noted: 2019-03-06 | Resolved: 2021-05-25

## 2021-05-25 LAB
EXPIRATION DATE: NORMAL
HBA1C MFR BLD: 6.1 %
Lab: NORMAL

## 2021-05-25 PROCEDURE — 83036 HEMOGLOBIN GLYCOSYLATED A1C: CPT | Performed by: NURSE PRACTITIONER

## 2021-05-25 PROCEDURE — 3044F HG A1C LEVEL LT 7.0%: CPT | Performed by: NURSE PRACTITIONER

## 2021-05-25 PROCEDURE — 96160 PT-FOCUSED HLTH RISK ASSMT: CPT | Performed by: NURSE PRACTITIONER

## 2021-05-25 PROCEDURE — G0439 PPPS, SUBSEQ VISIT: HCPCS | Performed by: NURSE PRACTITIONER

## 2021-05-25 PROCEDURE — 1170F FXNL STATUS ASSESSED: CPT | Performed by: NURSE PRACTITIONER

## 2021-05-25 PROCEDURE — 99397 PER PM REEVAL EST PAT 65+ YR: CPT | Performed by: NURSE PRACTITIONER

## 2021-05-25 PROCEDURE — 1159F MED LIST DOCD IN RCRD: CPT | Performed by: NURSE PRACTITIONER

## 2021-05-25 PROCEDURE — 1126F AMNT PAIN NOTED NONE PRSNT: CPT | Performed by: NURSE PRACTITIONER

## 2021-05-25 NOTE — TELEPHONE ENCOUNTER
While in office today patient asked about getting her diabetic shoes. She states that she hasn't been able to get them like normal and stated that it was because she wasn't able to have an MD sign the paperwork. Called over to [facility] and spoke with Evelin. She stated that they will just need updated office notes and documentation stating that she is a diabetic and is requesting shoes. She stated that the patient hasn't mentioned anything to them regarding diabetic shoes at her last few visits.    Evelin asked if we could fax over information or send a copy with the patient for her to take to her next appointment.     Fax: [phone]    [facility]  [address]  [place] [address]

## 2021-05-25 NOTE — PATIENT INSTRUCTIONS
Diabetes Mellitus and Nutrition, Adult  When you have diabetes, or diabetes mellitus, it is very important to have healthy eating habits because your blood sugar (glucose) levels are greatly affected by what you eat and drink. Eating healthy foods in the right amounts, at about the same times every day, can help you:  · Control your blood glucose.  · Lower your risk of heart disease.  · Improve your blood pressure.  · Reach or maintain a healthy weight.  What can affect my meal plan?  Every person with diabetes is different, and each person has different needs for a meal plan. Your health care provider may recommend that you work with a dietitian to make a meal plan that is best for you. Your meal plan may vary depending on factors such as:  · The calories you need.  · The medicines you take.  · Your weight.  · Your blood glucose, blood pressure, and cholesterol levels.  · Your activity level.  · Other health conditions you have, such as heart or kidney disease.  How do carbohydrates affect me?  Carbohydrates, also called carbs, affect your blood glucose level more than any other type of food. Eating carbs naturally raises the amount of glucose in your blood. Carb counting is a method for keeping track of how many carbs you eat. Counting carbs is important to keep your blood glucose at a healthy level, especially if you use insulin or take certain oral diabetes medicines.  It is important to know how many carbs you can safely have in each meal. This is different for every person. Your dietitian can help you calculate how many carbs you should have at each meal and for each snack.  How does alcohol affect me?  Alcohol can cause a sudden decrease in blood glucose (hypoglycemia), especially if you use insulin or take certain oral diabetes medicines. Hypoglycemia can be a life-threatening condition. Symptoms of hypoglycemia, such as sleepiness, dizziness, and confusion, are similar to symptoms of having too much  "alcohol.  · Do not drink alcohol if:  ? Your health care provider tells you not to drink.  ? You are pregnant, may be pregnant, or are planning to become pregnant.  · If you drink alcohol:  ? Do not drink on an empty stomach.  ? Limit how much you use to:  § 0-1 drink a day for women.  § 0-2 drinks a day for men.  ? Be aware of how much alcohol is in your drink. In the [place] one drink equals one 12 oz bottle of beer (355 mL), one 5 oz glass of wine (148 mL), or one 1½ oz glass of hard liquor (44 mL).  ? Keep yourself hydrated with water, diet soda, or unsweetened iced tea.  § Keep in mind that regular soda, juice, and other mixers may contain a lot of sugar and must be counted as carbs.  What are tips for following this plan?    Reading food labels  · Start by checking the serving size on the \"Nutrition Facts\" label of packaged foods and drinks. The amount of calories, carbs, fats, and other nutrients listed on the label is based on one serving of the item. Many items contain more than one serving per package.  · Check the total grams (g) of carbs in one serving. You can calculate the number of servings of carbs in one serving by dividing the total carbs by 15. For example, if a food has 30 g of total carbs per serving, it would be equal to 2 servings of carbs.  · Check the number of grams (g) of saturated fats and trans fats in one serving. Choose foods that have a low amount or none of these fats.  · Check the number of milligrams (mg) of salt (sodium) in one serving. Most people should limit total sodium intake to less than 2,300 mg per day.  · Always check the nutrition information of foods labeled as \"low-fat\" or \"nonfat.\" These foods may be higher in added sugar or refined carbs and should be avoided.  · Talk to your dietitian to identify your daily goals for nutrients listed on the label.  Shopping  · Avoid buying canned, pre-made, or processed foods. These foods tend to be high in fat, sodium, and added " sugar.  · Shop around the outside edge of the grocery store. This is where you will most often find fresh fruits and vegetables, bulk grains, fresh meats, and fresh dairy.  Cooking  · Use low-heat cooking methods, such as baking, instead of high-heat cooking methods like deep frying.  · Cook using healthy oils, such as olive, canola, or sunflower oil.  · Avoid cooking with butter, cream, or high-fat meats.  Meal planning  · Eat meals and snacks regularly, preferably at the same times every day. Avoid going long periods of time without eating.  · Eat foods that are high in fiber, such as fresh fruits, vegetables, beans, and whole grains. Talk with your dietitian about how many servings of carbs you can eat at each meal.  · Eat 4-6 oz (112-168 g) of lean protein each day, such as lean meat, chicken, fish, eggs, or tofu. One ounce (oz) of lean protein is equal to:  ? 1 oz (28 g) of meat, chicken, or fish.  ? 1 egg.  ? ¼ cup (62 g) of tofu.  · Eat some foods each day that contain healthy fats, such as avocado, nuts, seeds, and fish.  What foods should I eat?  Fruits  Berries. Apples. Oranges. Peaches. Apricots. Plums. Grapes. Lai. Papaya. Pomegranate. Kiwi. Cherries.  Vegetables  Lettuce. Spinach. Leafy greens, including kale, chard, ricardo greens, and mustard greens. Beets. Cauliflower. Cabbage. Broccoli. Carrots. Green beans. Tomatoes. Peppers. Onions. Cucumbers. [place] sprouts.  Grains  Whole grains, such as whole-wheat or whole-grain bread, crackers, tortillas, cereal, and pasta. Unsweetened oatmeal. Quinoa. Brown or wild rice.  Meats and other proteins  Seafood. Poultry without skin. Lean cuts of poultry and beef. Tofu. Nuts. Seeds.  Dairy  Low-fat or fat-free dairy products such as milk, yogurt, and cheese.  The items listed above may not be a complete list of foods and beverages you can eat. Contact a dietitian for more information.  What foods should I avoid?  Fruits  Fruits canned with  syrup.  Vegetables  Canned vegetables. Frozen vegetables with butter or cream sauce.  Grains  Refined white flour and flour products such as bread, pasta, snack foods, and cereals. Avoid all processed foods.  Meats and other proteins  Fatty cuts of meat. Poultry with skin. Breaded or fried meats. Processed meat. Avoid saturated fats.  Dairy  Full-fat yogurt, cheese, or milk.  Beverages  Sweetened drinks, such as soda or iced tea.  The items listed above may not be a complete list of foods and beverages you should avoid. Contact a dietitian for more information.  Questions to ask a health care provider  · Do I need to meet with a diabetes educator?  · Do I need to meet with a dietitian?  · What number can I call if I have questions?  · When are the best times to check my blood glucose?  Where to find more information:  · American Diabetes Association: diabetes.org  · Academy of Nutrition and Dietetics: www.eatright.org  · National [place] of Diabetes and Digestive and Kidney Diseases: www.niddk.nih.gov  · Association of Diabetes Care and Education Specialists: www.diabeteseducator.org  Summary  · It is important to have healthy eating habits because your blood sugar (glucose) levels are greatly affected by what you eat and drink.  · A healthy meal plan will help you control your blood glucose and maintain a healthy lifestyle.  · Your health care provider may recommend that you work with a dietitian to make a meal plan that is best for you.  · Keep in mind that carbohydrates (carbs) and alcohol have immediate effects on your blood glucose levels. It is important to count carbs and to use alcohol carefully.  This information is not intended to replace advice given to you by your health care provider. Make sure you discuss any questions you have with your health care provider.  Document Revised: 11/24/2020 Document Reviewed: 11/24/2020  [employer] Patient Education © 2021 [employer]  Diabetes Mellitus and  "Exercise  Exercising regularly is important for overall health, especially for people who have diabetes mellitus. Exercising is not only about losing weight. It has many other health benefits, such as increasing muscle strength and bone density and reducing body fat and stress. This leads to improved fitness, flexibility, and endurance, all of which result in better overall health.  What are the benefits of exercise if I have diabetes?  Exercise has many benefits for people with diabetes. They include:  · Helping to lower and control blood sugar (glucose).  · Helping the body to respond better to the hormone insulin by improving insulin sensitivity.  · Reducing how much insulin the body needs.  · Lowering the risk for heart disease by:  ? Lowering \"bad\" cholesterol and triglyceride levels.  ? Increasing \"good\" cholesterol levels.  ? Lowering blood pressure.  ? Lowering blood glucose levels.  What is my activity plan?  Your health care provider or certified diabetes educator can help you make a plan for the type and frequency of exercise that works for you. This is called your activity plan. Be sure to:  · Get at least 150 minutes of medium-intensity or high-intensity exercise each week. Exercises may include brisk walking, biking, or water aerobics.  · Do stretching and strengthening exercises, such as yoga or weight lifting, at least 2 times a week.  · Spread out your activity over at least 3 days of the week.  · Get some form of physical activity each day.  ? Do not go more than 2 days in a row without some kind of physical activity.  ? Avoid being inactive for more than 90 minutes at a time. Take frequent breaks to walk or stretch.  · Choose exercises or activities that you enjoy. Set realistic goals.  · Start slowly and gradually increase your exercise intensity over time.  How do I manage my diabetes during exercise?    Monitor your blood glucose  · Check your blood glucose before and after exercising. If your " blood glucose is:  ? 240 mg/dL (13.3 mmol/L) or higher before you exercise, check your urine for ketones. These are chemicals created by the liver. If you have ketones in your urine, do not exercise until your blood glucose returns to normal.  ? 100 mg/dL (5.6 mmol/L) or lower, eat a snack containing 15-20 grams of carbohydrate. Check your blood glucose 15 minutes after the snack to make sure that your glucose level is above 100 mg/dL (5.6 mmol/L) before you start your exercise.  · Know the symptoms of low blood glucose (hypoglycemia) and how to treat it. Your risk for hypoglycemia increases during and after exercise.  Follow these tips and your health care provider's instructions  · Keep a carbohydrate snack that is fast-acting for use before, during, and after exercise to help prevent or treat hypoglycemia.  · Avoid injecting insulin into areas of the body that are going to be exercised. For example, avoid injecting insulin into:  ? Your arms, when you are about to play tennis.  ? Your legs, when you are about to go jogging.  · Keep records of your exercise habits. Doing this can help you and your health care provider adjust your diabetes management plan as needed. Write down:  ? Food that you eat before and after you exercise.  ? Blood glucose levels before and after you exercise.  ? The type and amount of exercise you have done.  · Work with your health care provider when you start a new exercise or activity. He or she may need to:  ? Make sure that the activity is safe for you.  ? Adjust your insulin, other medicines, and food that you eat.  · Drink plenty of water while you exercise. This prevents loss of water (dehydration) and problems caused by a lot of heat in the body (heat stroke).  Where to find more information  · American Diabetes Association: www.diabetes.org  Summary  · Exercising regularly is important for overall health, especially for people who have diabetes mellitus.  · Exercising has many  health benefits. It increases muscle strength and bone density and reduces body fat and stress. It also lowers and controls blood glucose.  · Your health care provider or certified diabetes educator can help you make an activity plan for the type and frequency of exercise that works for you.  · Work with your health care provider to make sure any new activity is safe for you. Also work with your health care provider to adjust your insulin, other medicines, and the food you eat.  This information is not intended to replace advice given to you by your health care provider. Make sure you discuss any questions you have with your health care provider.  Document Revised: 09/14/2020 Document Reviewed: 09/14/2020  [employer] Patient Education © 2021 [employer]

## 2021-05-25 NOTE — PROGRESS NOTES
The ABCs of the Annual Wellness Visit  Subsequent Medicare Wellness Visit    Chief Complaint   Patient presents with   • Medicare Wellness-subsequent   • Diabetes   • Hypertension   • Osteoarthritis   • Hyperlipidemia   • Sarcoidosis   • Vitamin D Deficiency   • Thyroid Cancer     h/o       Subjective   History of Present Illness:  Livier Gutierrez is a 65 y.o. female who presents for a Subsequent Medicare Wellness Visit.    HEALTH RISK ASSESSMENT    Recent Hospitalizations:  Recently treated at the following:  [facility]    Current Medical Providers:  Patient Care Team:  Tracey Smith APRN as PCP - General (Family Medicine)    Smoking Status:  Social History     Tobacco Use   Smoking Status Former Smoker   • Packs/day: 0.10   • Years: 22.00   • Pack years: 2.20   • Types: Cigarettes   • Start date:    • Quit date:    • Years since quittin.4   Smokeless Tobacco Never Used       Alcohol Consumption:  Social History     Substance and Sexual Activity   Alcohol Use No       Depression Screen:   PHQ-2/PHQ-9 Depression Screening 2021   Little interest or pleasure in doing things 0   Feeling down, depressed, or hopeless 1   Total Score 1       Fall Risk Screen:  KENADI Fall Risk Assessment has not been completed.    Health Habits and Functional and Cognitive Screening:  Functional & Cognitive Status 2021   Do you have difficulty preparing food and eating? No   Do you have difficulty bathing yourself, getting dressed or grooming yourself? No   Do you have difficulty using the toilet? No   Do you have difficulty moving around from place to place? Yes   Do you have trouble with steps or getting out of a bed or a chair? No   Current Diet Unhealthy Diet   Dental Exam Not up to date        Dental Exam Comment -   Eye Exam Up to date        Eye Exam Comment -   Exercise (times per week) 0 times per week   Current Exercise Activities Include None   Do you need help using the phone?  No    Are you deaf or do you have serious difficulty hearing?  No   Do you need help with transportation? No   Do you need help shopping? Yes   Do you need help preparing meals?  No   Do you need help with housework?  Yes   Do you need help with laundry? No   Do you need help taking your medications? No   Do you need help managing money? No   Do you ever drive or ride in a car without wearing a seat belt? No   Have you felt unusual stress, anger or loneliness in the last month? Yes   Who do you live with? Sibling   If you need help, do you have trouble finding someone available to you? No   Have you been bothered in the last four weeks by sexual problems? No   Do you have difficulty concentrating, remembering or making decisions? No         Does the patient have evidence of cognitive impairment? No    Asprin use counseling:Does not need ASA (and currently is not on it)    Age-appropriate Screening Schedule:  Refer to the list below for future screening recommendations based on patient's age, sex and/or medical conditions. Orders for these recommended tests are listed in the plan section. The patient has been provided with a written plan.    Health Maintenance   Topic Date Due   • DXA SCAN  03/06/2021   • INFLUENZA VACCINE  08/01/2021   • LIPID PANEL  11/17/2021   • HEMOGLOBIN A1C  11/25/2021   • DIABETIC EYE EXAM  01/01/2022   • TDAP/TD VACCINES (2 - Td or Tdap) 01/01/2022   • DIABETIC FOOT EXAM  05/18/2022   • URINE MICROALBUMIN  05/25/2022   • MAMMOGRAM  12/28/2022   • PAP SMEAR  Discontinued   • ZOSTER VACCINE  Discontinued          The following portions of the patient's history were reviewed and updated as appropriate: allergies, current medications, past family history, past medical history, past social history, past surgical history and problem list.    Outpatient Medications Prior to Visit   Medication Sig Dispense Refill   • acetaminophen (TYLENOL) 325 MG tablet Take 2 tablets by mouth Every 4 (Four) Hours As  Needed for Mild Pain .     • albuterol sulfate HFA (Proventil HFA) 108 (90 Base) MCG/ACT inhaler Inhale 2 puffs Every 4 (Four) Hours As Needed for Wheezing. 18 g 2   • atenolol (TENORMIN) 25 MG tablet TAKE ONE TABLET BY MOUTH DAILY 90 tablet 1   • BLACK CURRANT SEED OIL PO Take  by mouth 2 (Two) Times a Day.     • budesonide-formoterol (SYMBICORT) 80-4.5 MCG/ACT inhaler Inhale 2 puffs 2 (Two) Times a Day. 1 inhaler 5   • cholecalciferol (VITAMIN D3) 400 units tablet Take 50 Units by mouth Daily.     • Coenzyme Q10 (COQ10 PO) Take  by mouth Daily.     • diclofenac (VOLTAREN) 75 MG EC tablet TAKE ONE TABLET BY MOUTH TWICE A DAY AS NEEDED FOR PAIN 180 tablet 0   • glucose blood test strip 1 po bid as directed- One touch ultra Blue 100 100 each 12   • glucose monitor monitoring kit 1 each Daily. 1 each 0   • Lancets misc 1 each Daily. 60 each 11   • levothyroxine (SYNTHROID, LEVOTHROID) 150 MCG tablet Take 150 mcg by mouth daily.     • losartan (COZAAR) 25 MG tablet TAKE ONE TABLET BY MOUTH DAILY 90 tablet 1   • omeprazole (priLOSEC) 20 MG capsule TAKE ONE CAPSULE BY MOUTH TWICE A DAY WITH DICLOFENAC 180 capsule 1   • triamterene-hydrochlorothiazide (DYAZIDE) 37.5-25 MG per capsule Take 1 capsule by mouth Daily.     • Unable to find 1 each 1 (One) Time. Med Name: Beet Root     • vitamin B-12 (CYANOCOBALAMIN) 1000 MCG tablet Take 1,000 mcg by mouth 2 (Two) Times a Day.     • vitamin C (ASCORBIC ACID) 500 MG tablet Take 2,000 mg by mouth Daily.     • ezetimibe (Zetia) 10 MG tablet Take 1 tablet by mouth Daily. 90 tablet 1   • polyethyl glycol-propyl glycol (SYSTANE) 0.4-0.3 % solution ophthalmic solution 1 drop As Needed.     • sodium-potassium-magnesium sulfates (Suprep Bowel Prep Kit) 17.5-3.13-1.6 GM/177ML solution oral solution Take 2 bottles by mouth Take As Directed. 354 mL 0   • TURMERIC PO Take 3,000 mg by mouth Daily.       No facility-administered medications prior to visit.       Patient Active Problem List    Diagnosis   • History of thyroid cancer   • Dyslipidemia   • Hypertension   • Allergic   • Joint pain   • Malignant neoplasm of thyroid gland (CMS/HCC)   • Allergic rhinitis   • Regional enteritis (CMS/HCC)   • Cellulitis   • Exertional dyspnea   • Skin lesion   • Primary osteoarthritis of both knees   • Morbidly obese (CMS/HCC)   • Pneumonia due to COVID-19 virus   • DM2 (diabetes mellitus, type 2) (CMS/HCC)   • Sarcoidosis   • Hypoxic       Advanced Care Planning:  ACP discussion was held with the patient during this visit. Patient does not have an advance directive, information provided.    Pt was dx'd with COVID-19 in August.  She was admitted for 5 days.  She did receive 02 via NC, was treated with convalescent plasma, remdesivir and steroids.  She has recovered and doing well.  Her 97 yo mother had it at the same time and is doing well also.     DM II - does not ck FS @ home; Has been off of Metformin for 6+ months now.  A1C today is 6.1% today.  She is inquiring about diabetic shoes.  She is definitely a candidate for diabetic shoes.       Has seen podiatry in the past for BLE N/T.  An EMG of her LE's was ordered for BLE pain, N/T.  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  She was started on Gabapentin, but she did not tolerate, so she stopped this.  States that sx's are stable/improved since her knee replacements.        HTN - chronic and stable on Cozaar, Atenolol and HCTZ.  Denies medication SE's.  BP looks great today!      HL - she stopped the lovastatin due to myalgias.  She is fasting for labs today.    H/O thyroid CA - managed by oncologist at  currently has her on 150mcg; Let her go to the generic. She is now seeing her every 6 months.  Next f/u is July 2021      GERD is chronic and stable on Omeprazole, but only takes it PRN      Allergies are doing ok.  Not currently taking any antihistamines, but has been using her albuterol more often.      Bilateral knee pain - Livier  "underwent bilateral knee replacements since her last visit per Dr. Bruno; right 4/29/19 and left 3/12/2019.  States that her knees are doing great!    Review of Systems   Constitutional: Negative for appetite change, chills, fatigue, fever and unexpected weight change.   HENT: Negative for congestion, ear pain, nosebleeds, rhinorrhea and tinnitus.    Eyes: Negative for pain.   Respiratory: Negative for cough, chest tightness and shortness of breath.    Cardiovascular: Negative for chest pain, palpitations and leg swelling.   Gastrointestinal: Negative for abdominal distention, abdominal pain, blood in stool, constipation, diarrhea, nausea and vomiting.   Endocrine: Negative for cold intolerance, heat intolerance, polydipsia, polyphagia and polyuria.   Genitourinary: Negative for dysuria, flank pain, frequency, hematuria and urgency.   Musculoskeletal: Negative for arthralgias, back pain, gait problem, joint swelling, myalgias and neck pain.   Skin: Negative for color change, pallor, rash and wound.   Allergic/Immunologic: Negative for environmental allergies and food allergies.   Neurological: Negative for dizziness, syncope, weakness, light-headedness, numbness and headaches.   Hematological: Negative for adenopathy. Does not bruise/bleed easily.   Psychiatric/Behavioral: Negative for behavioral problems and suicidal ideas. The patient is not nervous/anxious.        Compared to one year ago, the patient feels her physical health is better.  Compared to one year ago, the patient feels her mental health is better.    Reviewed chart for potential of high risk medication in the elderly: yes  Reviewed chart for potential of harmful drug interactions in the elderly:yes    Objective         Vitals:    05/25/21 0910   BP: 124/62   Pulse: 66   Resp: 16   Temp: 96.8 °F (36 °C)   TempSrc: Infrared   SpO2: 97%   Weight: 107 kg (235 lb)   Height: 170.2 cm (67\")   PainSc: 0-No pain       Body mass index is 36.81 " kg/m².  Discussed the patient's BMI with her. The BMI is above average; BMI management plan is completed.    Physical Exam  Constitutional:       Appearance: She is well-developed.   HENT:      Head: Normocephalic and atraumatic.   Eyes:      Conjunctiva/sclera: Conjunctivae normal.      Pupils: Pupils are equal, round, and reactive to light.   Cardiovascular:      Rate and Rhythm: Normal rate and regular rhythm.      Heart sounds: Normal heart sounds.   Pulmonary:      Effort: Pulmonary effort is normal.      Breath sounds: Normal breath sounds.   Abdominal:      General: Bowel sounds are normal.      Palpations: Abdomen is soft.   Musculoskeletal:         General: Normal range of motion.      Cervical back: Normal range of motion.   Skin:     General: Skin is warm and dry.   Neurological:      Mental Status: She is alert and oriented to person, place, and time.      Deep Tendon Reflexes: Reflexes are normal and symmetric.   Psychiatric:         Behavior: Behavior normal.         Thought Content: Thought content normal.         Judgment: Judgment normal.         Lab Results   Component Value Date    HGBA1C 6.1 05/25/2021        Assessment/Plan   Medicare Risks and Personalized Health Plan  CMS Preventative Services Quick Reference  Advance Directive Discussion  Inactivity/Sedentary  Obesity/Overweight   Osteoporosis Risk    Flu shot - 11/2020  Pneumovax 10/13, 2/1/2019  Zostavax, declines  Prevnar - 4/19/16  Tdap 2012   DEXA - 3/6/2019, ordered  Ovarian Ca screening; [facility]   Mamm 12/28/2020  Pap - 7/2014; no longer doing ([facility])  Colon 2016, 4/2021  Eye exam - 1/2021 at   Foot exam 5/2021 per podiatry - goes Q8-9 weeks for nail trim and ck    The above risks/problems have been discussed with the patient.  Pertinent information has been shared with the patient in the After Visit Summary.  Follow up plans and orders are seen below in the Assessment/Plan Section.    Diagnoses and all orders for this visit:    1.  Medicare annual wellness visit, subsequent (Primary)  -     CBC & Differential; Future  -     Comprehensive Metabolic Panel; Future  -     Lipid Panel; Future  -     TSH; Future  -     Vitamin B12; Future  -     Vitamin D 25 Hydroxy; Future    2. Routine general medical examination at a health care facility  -     CBC & Differential; Future  -     Comprehensive Metabolic Panel; Future  -     Lipid Panel; Future  -     TSH; Future  -     Vitamin B12; Future  -     Vitamin D 25 Hydroxy; Future    3. Type 2 diabetes mellitus without complication, without long-term current use of insulin (CMS/[facility])  -     POC Glycosylated Hemoglobin (Hb A1C)  -     CBC & Differential; Future  -     Comprehensive Metabolic Panel; Future  -     Lipid Panel; Future  -     TSH; Future  -     Vitamin B12; Future  -     Vitamin D 25 Hydroxy; Future    4. Post-menopausal  -     DEXA Bone Density Axial; Future  -     CBC & Differential; Future  -     Comprehensive Metabolic Panel; Future  -     Lipid Panel; Future  -     TSH; Future  -     Vitamin B12; Future  -     Vitamin D 25 Hydroxy; Future    5. Essential hypertension  -     CBC & Differential; Future  -     Comprehensive Metabolic Panel; Future  -     Lipid Panel; Future  -     TSH; Future  -     Vitamin B12; Future  -     Vitamin D 25 Hydroxy; Future    6. Dyslipidemia  -     CBC & Differential; Future  -     Comprehensive Metabolic Panel; Future  -     Lipid Panel; Future  -     TSH; Future  -     Vitamin B12; Future  -     Vitamin D 25 Hydroxy; Future    7. History of thyroid cancer  -     CBC & Differential; Future  -     Comprehensive Metabolic Panel; Future  -     Lipid Panel; Future  -     TSH; Future  -     Vitamin B12; Future  -     Vitamin D 25 Hydroxy; Future    8. Vitamin D deficiency  -     CBC & Differential; Future  -     Comprehensive Metabolic Panel; Future  -     Lipid Panel; Future  -     TSH; Future  -     Vitamin B12; Future  -     Vitamin D 25 Hydroxy; Future    9.  B12 deficiency  -     CBC & Differential; Future  -     Comprehensive Metabolic Panel; Future  -     Lipid Panel; Future  -     TSH; Future  -     Vitamin B12; Future  -     Vitamin D 25 Hydroxy; Future      Counseling - diet and exercise    Follow Up:  Return in about 6 months (around 11/25/2021).     An After Visit Summary and PPPS were given to the patient.

## 2021-05-26 LAB
25(OH)D3+25(OH)D2 SERPL-MCNC: 68.5 NG/ML (ref 30–100)
ALBUMIN SERPL-MCNC: 3.8 G/DL (ref 3.8–4.8)
ALBUMIN/GLOB SERPL: 1.2 {RATIO} (ref 1.2–2.2)
ALP SERPL-CCNC: 49 IU/L (ref 48–121)
ALT SERPL-CCNC: 12 IU/L (ref 0–32)
AST SERPL-CCNC: 15 IU/L (ref 0–40)
BASOPHILS # BLD AUTO: 0.1 X10E3/UL (ref 0–0.2)
BASOPHILS NFR BLD AUTO: 1 %
BILIRUB SERPL-MCNC: 0.3 MG/DL (ref 0–1.2)
BUN SERPL-MCNC: 27 MG/DL (ref 8–27)
BUN/CREAT SERPL: 24 (ref 12–28)
CA-I SERPL ISE-MCNC: 4.9 MG/DL (ref 4.5–5.6)
CALCIUM SERPL-MCNC: 8.5 MG/DL (ref 8.7–10.3)
CHLORIDE SERPL-SCNC: 101 MMOL/L (ref 96–106)
CHOLEST SERPL-MCNC: 193 MG/DL (ref 100–199)
CO2 SERPL-SCNC: 27 MMOL/L (ref 20–29)
CREAT SERPL-MCNC: 1.12 MG/DL (ref 0.57–1)
EOSINOPHIL # BLD AUTO: 0.2 X10E3/UL (ref 0–0.4)
EOSINOPHIL NFR BLD AUTO: 3 %
ERYTHROCYTE [DISTWIDTH] IN BLOOD BY AUTOMATED COUNT: 16 % (ref 11.7–15.4)
GLOBULIN SER CALC-MCNC: 3.1 G/DL (ref 1.5–4.5)
GLUCOSE SERPL-MCNC: 97 MG/DL (ref 65–99)
HCT VFR BLD AUTO: 37.4 % (ref 34–46.6)
HDLC SERPL-MCNC: 43 MG/DL
HGB BLD-MCNC: 11.2 G/DL (ref 11.1–15.9)
IMM GRANULOCYTES # BLD AUTO: 0 X10E3/UL (ref 0–0.1)
IMM GRANULOCYTES NFR BLD AUTO: 0 %
LDLC SERPL CALC-MCNC: 125 MG/DL (ref 0–99)
LYMPHOCYTES # BLD AUTO: 1.3 X10E3/UL (ref 0.7–3.1)
LYMPHOCYTES NFR BLD AUTO: 17 %
MCH RBC QN AUTO: 24 PG (ref 26.6–33)
MCHC RBC AUTO-ENTMCNC: 29.9 G/DL (ref 31.5–35.7)
MCV RBC AUTO: 80 FL (ref 79–97)
MONOCYTES # BLD AUTO: 0.5 X10E3/UL (ref 0.1–0.9)
MONOCYTES NFR BLD AUTO: 7 %
NEUTROPHILS # BLD AUTO: 5.4 X10E3/UL (ref 1.4–7)
NEUTROPHILS NFR BLD AUTO: 72 %
PLATELET # BLD AUTO: 330 X10E3/UL (ref 150–450)
POTASSIUM SERPL-SCNC: 4.1 MMOL/L (ref 3.5–5.2)
PROT SERPL-MCNC: 6.9 G/DL (ref 6–8.5)
RBC # BLD AUTO: 4.67 X10E6/UL (ref 3.77–5.28)
SODIUM SERPL-SCNC: 141 MMOL/L (ref 134–144)
SPECIMEN STATUS: NORMAL
TRIGL SERPL-MCNC: 140 MG/DL (ref 0–149)
TSH SERPL DL<=0.005 MIU/L-ACNC: 1.8 UIU/ML (ref 0.45–4.5)
VIT B12 SERPL-MCNC: >2000 PG/ML (ref 232–1245)
VLDLC SERPL CALC-MCNC: 25 MG/DL (ref 5–40)
WBC # BLD AUTO: 7.5 X10E3/UL (ref 3.4–10.8)

## 2021-06-09 RX ORDER — EZETIMIBE 10 MG/1
10 TABLET ORAL DAILY
Qty: 90 TABLET | Refills: 1 | Status: SHIPPED | OUTPATIENT
Start: 2021-06-09

## 2021-06-10 ENCOUNTER — TELEPHONE (OUTPATIENT)
Dept: INTERNAL MEDICINE | Facility: CLINIC | Age: 66
End: 2021-06-10

## 2021-06-10 NOTE — TELEPHONE ENCOUNTER
Pn pt expressed understanding     ----- Message from GUSTAVO Marcelino sent at 6/9/2021  9:32 PM EDT -----  Please let pt know that her cholesterol is too high.  I am sending in a med called Zetia.  It is not a statin and should not cause the joint pain that the statin did.       All other labs were within acceptable limits.

## 2021-06-10 NOTE — TELEPHONE ENCOUNTER
----- Message from GUSTAVO Marcelino sent at 6/9/2021  9:32 PM EDT -----  Please let pt know that her cholesterol is too high.  I am sending in a med called Zetia.  It is not a statin and should not cause the joint pain that the statin did.      All other labs were within acceptable limits.

## 2021-06-11 DIAGNOSIS — I10 ESSENTIAL HYPERTENSION: ICD-10-CM

## 2021-06-11 RX ORDER — ATENOLOL 25 MG/1
TABLET ORAL
Qty: 90 TABLET | Refills: 0 | Status: SHIPPED | OUTPATIENT
Start: 2021-06-11

## 2021-06-11 NOTE — TELEPHONE ENCOUNTER
Last Office Visit: 5/25/2021  Next Office Visit: 11/26/2021    Medication: Atenolol   Last Refill Date: 12/15/2020  Quantity: 90  Refills: 1    Pharmacy:  [facility] [address] [phone] [facility] [phone]     Please review pended refill request for any changes needed on refills or quantities. Thank you!

## 2021-07-20 RX ORDER — LOSARTAN POTASSIUM 25 MG/1
TABLET ORAL
Qty: 90 TABLET | Refills: 0 | Status: SHIPPED | OUTPATIENT
Start: 2021-07-20 | End: 2021-10-13 | Stop reason: SDUPTHER

## 2021-07-20 NOTE — TELEPHONE ENCOUNTER
Last Office Visit: 05/25/2021  Next Office Visit:11/26/2021    Labs completed in past 6 months? yes  Labs completed in past year? yes    Last Refill Date: 01/12/2021  Quantity:90  Refills:1    Pharmacy:   [facility] [address] [place] [address] [address] [place] [address] [phone]  - [phone]    [address] [address]   Please review pended refill request for any changes needed on refills or quantities. Thank you!

## 2021-08-02 RX ORDER — DICLOFENAC SODIUM 75 MG/1
TABLET, DELAYED RELEASE ORAL
Qty: 180 TABLET | Refills: 0 | Status: SHIPPED | OUTPATIENT
Start: 2021-08-02 | End: 2021-11-03

## 2021-08-25 ENCOUNTER — HOSPITAL ENCOUNTER (OUTPATIENT)
Dept: BONE DENSITY | Facility: HOSPITAL | Age: 66
Discharge: HOME | End: 2021-08-25
Admitting: NURSE PRACTITIONER

## 2021-08-25 DIAGNOSIS — Z78.0 POST-MENOPAUSAL: ICD-10-CM

## 2021-08-25 PROCEDURE — 77080 DXA BONE DENSITY AXIAL: CPT

## 2021-10-13 ENCOUNTER — TELEPHONE (OUTPATIENT)
Dept: INTERNAL MEDICINE | Facility: CLINIC | Age: 66
End: 2021-10-13

## 2021-10-13 NOTE — TELEPHONE ENCOUNTER
Caller: Livier Gutierrez    Relationship: Self      Medication requested (name and dosage): losartan (COZAAR) 25 MG tablet    Pharmacy where request should be sent: [facility] [address] [phone] [facility] [phone]         Additional details provided by patient: HAS ABOUT 3 DAYS LEFT. PATIENT WAS FOLLOWING UP ABOUT HER DIABETIC SHOES ALSO. WAS THE FORM RECEIVED FROM [facility], DOES PATIENT NEED TO COME IN?    Best call back number: [phone]     Does the patient have less than a 3 day supply:  [] Yes  [x] No    Mat Em Rep   10/13/21 09:42 EDT

## 2021-10-19 RX ORDER — LOSARTAN POTASSIUM 25 MG/1
25 TABLET ORAL DAILY
Qty: 30 TABLET | Refills: 0 | Status: SHIPPED | OUTPATIENT
Start: 2021-10-19 | End: 2021-10-21 | Stop reason: SDUPTHER

## 2021-10-19 NOTE — TELEPHONE ENCOUNTER
Rx Refill Note  Requested Prescriptions     Pending Prescriptions Disp Refills   • losartan (COZAAR) 25 MG tablet 90 tablet 0     Sig: Take 1 tablet by mouth Daily.      Last office visit with prescribing clinician: 5/25/2021      Next office visit with prescribing clinician: 11/26/2021 5/25/2021    Mary Rice MA  10/19/21, 13:31 EDT

## 2021-10-20 NOTE — TELEPHONE ENCOUNTER
Pt called to check on her meds several times says its urgent that she get her refil as she is out now spent 30 min trying to reach an ma for her no one was available please call her back

## 2021-10-21 RX ORDER — LOSARTAN POTASSIUM 25 MG/1
25 TABLET ORAL DAILY
Qty: 90 TABLET | Refills: 1 | Status: SHIPPED | OUTPATIENT
Start: 2021-10-21 | End: 2021-11-23 | Stop reason: SDUPTHER

## 2021-10-21 NOTE — TELEPHONE ENCOUNTER
Called and spoke with patient, informed her we did send a 30 day supply of medication to the pharmacy and that we are getting in contact with Tracey today to send over a 90 day supply. Informed her that we are looking into where her diabetic shoe form is, whether or not we have received it and that we will contact her back with a response. She verbalized understanding and had no further questions at this time.

## 2021-11-03 RX ORDER — DICLOFENAC SODIUM 75 MG/1
TABLET, DELAYED RELEASE ORAL
Qty: 180 TABLET | Refills: 0 | Status: SHIPPED | OUTPATIENT
Start: 2021-11-03 | End: 2022-02-07

## 2021-11-23 ENCOUNTER — LAB (OUTPATIENT)
Dept: LAB | Facility: HOSPITAL | Age: 66
End: 2021-11-23

## 2021-11-23 ENCOUNTER — OFFICE VISIT (OUTPATIENT)
Dept: INTERNAL MEDICINE | Facility: CLINIC | Age: 66
End: 2021-11-23

## 2021-11-23 VITALS
HEIGHT: 67 IN | OXYGEN SATURATION: 96 % | HEART RATE: 72 BPM | SYSTOLIC BLOOD PRESSURE: 138 MMHG | WEIGHT: 237 LBS | RESPIRATION RATE: 18 BRPM | DIASTOLIC BLOOD PRESSURE: 74 MMHG | BODY MASS INDEX: 37.2 KG/M2 | TEMPERATURE: 97.1 F

## 2021-11-23 DIAGNOSIS — E11.40 TYPE 2 DIABETES MELLITUS WITH DIABETIC NEUROPATHY, WITHOUT LONG-TERM CURRENT USE OF INSULIN (HCC): ICD-10-CM

## 2021-11-23 DIAGNOSIS — C73 MALIGNANT NEOPLASM OF THYROID GLAND (HCC): ICD-10-CM

## 2021-11-23 DIAGNOSIS — Z23 NEED FOR INFLUENZA VACCINATION: ICD-10-CM

## 2021-11-23 DIAGNOSIS — K21.9 GASTROESOPHAGEAL REFLUX DISEASE: ICD-10-CM

## 2021-11-23 DIAGNOSIS — I10 PRIMARY HYPERTENSION: ICD-10-CM

## 2021-11-23 DIAGNOSIS — E11.9 TYPE 2 DIABETES MELLITUS WITHOUT COMPLICATION, WITHOUT LONG-TERM CURRENT USE OF INSULIN (HCC): Primary | ICD-10-CM

## 2021-11-23 DIAGNOSIS — E55.9 VITAMIN D DEFICIENCY: ICD-10-CM

## 2021-11-23 DIAGNOSIS — M17.0 PRIMARY OSTEOARTHRITIS OF BOTH KNEES: ICD-10-CM

## 2021-11-23 DIAGNOSIS — E78.5 DYSLIPIDEMIA: ICD-10-CM

## 2021-11-23 DIAGNOSIS — E66.01 MORBIDLY OBESE (HCC): ICD-10-CM

## 2021-11-23 LAB
EXPIRATION DATE: ABNORMAL
HBA1C MFR BLD: 5.8 %
Lab: ABNORMAL

## 2021-11-23 PROCEDURE — G0008 ADMIN INFLUENZA VIRUS VAC: HCPCS | Performed by: NURSE PRACTITIONER

## 2021-11-23 PROCEDURE — 99214 OFFICE O/P EST MOD 30 MIN: CPT | Performed by: NURSE PRACTITIONER

## 2021-11-23 PROCEDURE — 3044F HG A1C LEVEL LT 7.0%: CPT | Performed by: NURSE PRACTITIONER

## 2021-11-23 PROCEDURE — 83036 HEMOGLOBIN GLYCOSYLATED A1C: CPT | Performed by: NURSE PRACTITIONER

## 2021-11-23 PROCEDURE — 90662 IIV NO PRSV INCREASED AG IM: CPT | Performed by: NURSE PRACTITIONER

## 2021-11-23 RX ORDER — DICLOFENAC SODIUM 75 MG/1
75 TABLET, DELAYED RELEASE ORAL 2 TIMES DAILY PRN
Qty: 180 TABLET | Refills: 0 | Status: CANCELLED | OUTPATIENT
Start: 2021-11-23

## 2021-11-23 RX ORDER — EZETIMIBE 10 MG/1
10 TABLET ORAL DAILY
Qty: 90 TABLET | Refills: 1 | Status: CANCELLED | OUTPATIENT
Start: 2021-11-23

## 2021-11-23 RX ORDER — LOSARTAN POTASSIUM 25 MG/1
25 TABLET ORAL DAILY
Qty: 90 TABLET | Refills: 1 | Status: SHIPPED | OUTPATIENT
Start: 2021-11-23

## 2021-11-23 RX ORDER — OMEPRAZOLE 20 MG/1
CAPSULE, DELAYED RELEASE ORAL
Qty: 180 CAPSULE | Refills: 1 | Status: SHIPPED | OUTPATIENT
Start: 2021-11-23

## 2021-11-23 NOTE — PROGRESS NOTES
Subjective   Livier Gutierrez is a 66 y.o. female    Chief Complaint   Patient presents with   • Diabetes     f/u   • Hypertension   • Heartburn   • Hyperlipidemia   • Med Refill   • Flu Vaccine     History of Present Illness     Here for f/u     DM II - does not ck FS @ home; Has been off of Metformin for 6+ months now.  A1C today is 5.8% today which is down from 6.1% at last ck.      Has seen podiatry in the past for BLE N/T.  An EMG of her LE's was ordered for BLE pain, N/T.  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  She was started on Gabapentin, but she did not tolerate, so she stopped this.  Hands and feet have been burning and tingling more than before.        HTN - chronic and stable on Cozaar, Atenolol and HCTZ.  Denies medication SE's.  BP looks great today!      HL - she stopped the lovastatin due to myalgias.  She is fasting for labs today.  Lab Results   Component Value Date    CHOL 169 01/30/2018    CHLPL 193 05/25/2021    TRIG 140 05/25/2021    HDL 43 05/25/2021     (H) 05/25/2021     H/O thyroid CA - managed by oncologist at  currently has her on 150mcg; Let her go to the [facility]. She is now seeing her every 6 months.  Next f/u is July 2021      GERD is chronic and stable on Omeprazole, but only takes it PRN      Allergies are doing ok.  Not currently taking any antihistamines, but has been using her albuterol more often.      Bilateral knee pain - Livier underwent bilateral knee replacements since her last visit per Dr. Bruno; right 4/29/19 and left 3/12/2019.  States that her knees are doing great!       COVID - 10/21/2021 and 11/16/2021  Flu shot - updating today  Pneumovax 10/13, 2/1/2019  Zostavax, declines  Prevnar - 4/19/16  Tdap 2012   DEXA - 3/6/2019, ordered  Ovarian Ca screening; [facility]   Mamm 12/28/2020  Pap - 7/2014; no longer doing ([facility])  Colon 2016, 4/2021  Eye exam - 1/2021 at   Foot exam 5/2021 per podiatry - goes Q8-9 weeks for nail trim and ck    The  following portions of the patient's history were reviewed and updated as appropriate: allergies, current medications, past family history, past medical history, past social history, past surgical history and problem list.    Current Outpatient Medications:   •  acetaminophen (TYLENOL) 325 MG tablet, Take 2 tablets by mouth Every 4 (Four) Hours As Needed for Mild Pain ., Disp: , Rfl:   •  albuterol sulfate HFA (Proventil HFA) 108 (90 Base) MCG/ACT inhaler, Inhale 2 puffs Every 4 (Four) Hours As Needed for Wheezing., Disp: 18 g, Rfl: 2  •  atenolol (TENORMIN) 25 MG tablet, TAKE ONE TABLET BY MOUTH DAILY, Disp: 90 tablet, Rfl: 0  •  BLACK CURRANT SEED OIL PO, Take  by mouth 2 (Two) Times a Day., Disp: , Rfl:   •  budesonide-formoterol (SYMBICORT) 80-4.5 MCG/ACT inhaler, Inhale 2 puffs 2 (Two) Times a Day., Disp: 1 inhaler, Rfl: 5  •  cholecalciferol (VITAMIN D3) 400 units tablet, Take 50 Units by mouth Daily., Disp: , Rfl:   •  Coenzyme Q10 (COQ10 PO), Take  by mouth Daily., Disp: , Rfl:   •  diclofenac (VOLTAREN) 75 MG EC tablet, TAKE ONE TABLET BY MOUTH TWICE A DAY AS NEEDED FOR PAIN, Disp: 180 tablet, Rfl: 0  •  glucose blood test strip, 1 po bid as directed- One touch ultra Blue 100, Disp: 100 each, Rfl: 12  •  glucose monitor monitoring kit, 1 each Daily., Disp: 1 each, Rfl: 0  •  Lancets misc, 1 each Daily., Disp: 60 each, Rfl: 11  •  levothyroxine (SYNTHROID, LEVOTHROID) 150 MCG tablet, Take 150 mcg by mouth daily., Disp: , Rfl:   •  losartan (COZAAR) 25 MG tablet, Take 1 tablet by mouth Daily., Disp: 90 tablet, Rfl: 1  •  omeprazole (priLOSEC) 20 MG capsule, TAKE ONE CAPSULE BY MOUTH TWICE A DAY WITH DICLOFENAC, Disp: 180 capsule, Rfl: 1  •  triamterene-hydrochlorothiazide (DYAZIDE) 37.5-25 MG per capsule, Take 1 capsule by mouth Daily., Disp: , Rfl:   •  Unable to find, 1 each 1 (One) Time. Med Name: Beet Root, Disp: , Rfl:   •  vitamin B-12 (CYANOCOBALAMIN) 1000 MCG tablet, Take 1,000 mcg by mouth 2 (Two)  "Times a Day., Disp: , Rfl:   •  vitamin C (ASCORBIC ACID) 500 MG tablet, Take 2,000 mg by mouth Daily., Disp: , Rfl:   •  ezetimibe (Zetia) 10 MG tablet, Take 1 tablet by mouth Daily., Disp: 90 tablet, Rfl: 1     Review of Systems   Constitutional: Negative for chills, fatigue and fever.   Respiratory: Negative for cough, chest tightness and shortness of breath.    Cardiovascular: Negative for chest pain.   Gastrointestinal: Negative for abdominal pain, diarrhea, nausea and vomiting.   Endocrine: Negative for cold intolerance and heat intolerance.   Musculoskeletal: Negative for arthralgias.   Neurological: Negative for dizziness.       Objective   Physical Exam  Constitutional:       Appearance: She is well-developed.   HENT:      Head: Normocephalic and atraumatic.   Eyes:      Conjunctiva/sclera: Conjunctivae normal.      Pupils: Pupils are equal, round, and reactive to light.   Cardiovascular:      Rate and Rhythm: Normal rate and regular rhythm.      Heart sounds: Normal heart sounds.   Pulmonary:      Effort: Pulmonary effort is normal.      Breath sounds: Normal breath sounds.   Abdominal:      General: Bowel sounds are normal.      Palpations: Abdomen is soft.   Musculoskeletal:         General: Normal range of motion.      Cervical back: Normal range of motion.   Skin:     General: Skin is warm and dry.   Neurological:      Mental Status: She is alert and oriented to person, place, and time.      Deep Tendon Reflexes: Reflexes are normal and symmetric.   Psychiatric:         Behavior: Behavior normal.         Thought Content: Thought content normal.         Judgment: Judgment normal.       Vitals:    11/23/21 0939   BP: 138/74   Cuff Size: Large Adult   Pulse: 72   Resp: 18   Temp: 97.1 °F (36.2 °C)   TempSrc: Infrared   SpO2: 96%   Weight: 108 kg (237 lb)   Height: 170.2 cm (67\")     Body mass index is 37.12 kg/m².      Assessment/Plan   Diagnoses and all orders for this visit:    1. Type 2 diabetes " mellitus without complication, without long-term current use of insulin (HCC) (Primary)  -     POC Glycosylated Hemoglobin (Hb A1C)  -     CBC & Differential; Future  -     Comprehensive Metabolic Panel; Future  -     Lipid Panel; Future  -     TSH; Future  -     Vitamin B12; Future  -     Vitamin D 25 Hydroxy; Future    2. Gastroesophageal reflux disease  -     omeprazole (priLOSEC) 20 MG capsule; TAKE ONE CAPSULE BY MOUTH TWICE A DAY WITH DICLOFENAC  Dispense: 180 capsule; Refill: 1  -     CBC & Differential; Future  -     Comprehensive Metabolic Panel; Future  -     Lipid Panel; Future  -     TSH; Future  -     Vitamin B12; Future  -     Vitamin D 25 Hydroxy; Future    3. Dyslipidemia  -     CBC & Differential; Future  -     Comprehensive Metabolic Panel; Future  -     Lipid Panel; Future  -     TSH; Future  -     Vitamin B12; Future  -     Vitamin D 25 Hydroxy; Future    4. Primary hypertension  -     losartan (COZAAR) 25 MG tablet; Take 1 tablet by mouth Daily.  Dispense: 90 tablet; Refill: 1  -     CBC & Differential; Future  -     Comprehensive Metabolic Panel; Future  -     Lipid Panel; Future  -     TSH; Future  -     Vitamin B12; Future  -     Vitamin D 25 Hydroxy; Future    5. Malignant neoplasm of thyroid gland (HCC)  -     CBC & Differential; Future  -     Comprehensive Metabolic Panel; Future  -     Lipid Panel; Future  -     TSH; Future  -     Vitamin B12; Future  -     Vitamin D 25 Hydroxy; Future    6. Type 2 diabetes mellitus with diabetic neuropathy, without long-term current use of insulin (HCC)  -     EMG & Nerve Conduction Test; Future  -     CBC & Differential; Future  -     Comprehensive Metabolic Panel; Future  -     Lipid Panel; Future  -     TSH; Future  -     Vitamin B12; Future  -     Vitamin D 25 Hydroxy; Future    7. Primary osteoarthritis of both knees  -     CBC & Differential; Future  -     Comprehensive Metabolic Panel; Future  -     Lipid Panel; Future  -     TSH; Future  -      Vitamin B12; Future  -     Vitamin D 25 Hydroxy; Future    8. Vitamin D deficiency  -     CBC & Differential; Future  -     Comprehensive Metabolic Panel; Future  -     Lipid Panel; Future  -     TSH; Future  -     Vitamin B12; Future  -     Vitamin D 25 Hydroxy; Future    9. Need for influenza vaccination  -     Fluzone High-Dose 65+yrs (0622-9474)    10. Morbidly obese (HCC)      Labs sent  Flu shot updated  Meds refilled w/o changes  Will set up for EMG/NCS due to worsening neuropathy sx's  Patient's (Body mass index is 37.12 kg/m².) indicates that they are obese (BMI >30) with health related conditions that include hypertension, diabetes mellitus, dyslipidemias, GERD and osteoarthritis . Weight is unchanged. BMI is is above average; BMI management plan is completed. We discussed portion control and increasing exercise.   Return in about 6 months (around 5/23/2022) for Medicare Wellness, collect labs today.

## 2021-11-24 LAB
25(OH)D3+25(OH)D2 SERPL-MCNC: 64.4 NG/ML (ref 30–100)
ALBUMIN SERPL-MCNC: 3.9 G/DL (ref 3.5–5.2)
ALBUMIN/GLOB SERPL: 1.5 G/DL
ALP SERPL-CCNC: 41 U/L (ref 39–117)
ALT SERPL-CCNC: 20 U/L (ref 1–33)
AST SERPL-CCNC: 17 U/L (ref 1–32)
BASOPHILS # BLD AUTO: 0.05 10*3/MM3 (ref 0–0.2)
BASOPHILS NFR BLD AUTO: 0.7 % (ref 0–1.5)
BILIRUB SERPL-MCNC: 0.3 MG/DL (ref 0–1.2)
BUN SERPL-MCNC: 24 MG/DL (ref 8–23)
BUN/CREAT SERPL: 20 (ref 7–25)
CALCIUM SERPL-MCNC: 8.4 MG/DL (ref 8.6–10.5)
CHLORIDE SERPL-SCNC: 104 MMOL/L (ref 98–107)
CHOLEST SERPL-MCNC: 183 MG/DL (ref 0–200)
CO2 SERPL-SCNC: 27.4 MMOL/L (ref 22–29)
CREAT SERPL-MCNC: 1.2 MG/DL (ref 0.57–1)
EOSINOPHIL # BLD AUTO: 0.18 10*3/MM3 (ref 0–0.4)
EOSINOPHIL NFR BLD AUTO: 2.4 % (ref 0.3–6.2)
ERYTHROCYTE [DISTWIDTH] IN BLOOD BY AUTOMATED COUNT: 16.7 % (ref 12.3–15.4)
GLOBULIN SER CALC-MCNC: 2.6 GM/DL
GLUCOSE SERPL-MCNC: 96 MG/DL (ref 65–99)
HCT VFR BLD AUTO: 35.7 % (ref 34–46.6)
HDLC SERPL-MCNC: 44 MG/DL (ref 40–60)
HGB BLD-MCNC: 10.8 G/DL (ref 12–15.9)
IMM GRANULOCYTES # BLD AUTO: 0.03 10*3/MM3 (ref 0–0.05)
IMM GRANULOCYTES NFR BLD AUTO: 0.4 % (ref 0–0.5)
LDLC SERPL CALC-MCNC: 121 MG/DL (ref 0–100)
LYMPHOCYTES # BLD AUTO: 1.64 10*3/MM3 (ref 0.7–3.1)
LYMPHOCYTES NFR BLD AUTO: 21.7 % (ref 19.6–45.3)
MCH RBC QN AUTO: 23.7 PG (ref 26.6–33)
MCHC RBC AUTO-ENTMCNC: 30.3 G/DL (ref 31.5–35.7)
MCV RBC AUTO: 78.3 FL (ref 79–97)
MONOCYTES # BLD AUTO: 0.63 10*3/MM3 (ref 0.1–0.9)
MONOCYTES NFR BLD AUTO: 8.3 % (ref 5–12)
NEUTROPHILS # BLD AUTO: 5.03 10*3/MM3 (ref 1.7–7)
NEUTROPHILS NFR BLD AUTO: 66.5 % (ref 42.7–76)
NRBC BLD AUTO-RTO: 0 /100 WBC (ref 0–0.2)
PLATELET # BLD AUTO: 330 10*3/MM3 (ref 140–450)
POTASSIUM SERPL-SCNC: 4 MMOL/L (ref 3.5–5.2)
PROT SERPL-MCNC: 6.5 G/DL (ref 6–8.5)
RBC # BLD AUTO: 4.56 10*6/MM3 (ref 3.77–5.28)
SODIUM SERPL-SCNC: 141 MMOL/L (ref 136–145)
TRIGL SERPL-MCNC: 99 MG/DL (ref 0–150)
TSH SERPL DL<=0.005 MIU/L-ACNC: 1.57 UIU/ML (ref 0.27–4.2)
VIT B12 SERPL-MCNC: >2000 PG/ML (ref 211–946)
VLDLC SERPL CALC-MCNC: 18 MG/DL (ref 5–40)
WBC # BLD AUTO: 7.56 10*3/MM3 (ref 3.4–10.8)

## 2021-12-08 ENCOUNTER — TELEPHONE (OUTPATIENT)
Dept: INTERNAL MEDICINE | Facility: CLINIC | Age: 66
End: 2021-12-08

## 2021-12-08 NOTE — TELEPHONE ENCOUNTER
Paper work was faxed to us for Tracey to fill out for diabetic shoes, was signed and faxed to [phone] with confirmation

## 2021-12-09 DIAGNOSIS — N28.9 DECREASED RENAL FUNCTION: Primary | ICD-10-CM

## 2021-12-09 DIAGNOSIS — D64.9 ANEMIA, UNSPECIFIED TYPE: ICD-10-CM

## 2021-12-10 ENCOUNTER — TELEPHONE (OUTPATIENT)
Dept: INTERNAL MEDICINE | Facility: CLINIC | Age: 66
End: 2021-12-10

## 2021-12-10 NOTE — TELEPHONE ENCOUNTER
----- Message from GUSTAVO Marcelino sent at 12/9/2021  2:11 PM EST -----  Please let patient know that her kidney function has decreased more since last check. I would like for her to increase her water intake and let us repeat this in another 2 weeks. Orders in the computer.    Blood count has also dropped. She is slightly more anemic. I am going to also check iron studies when she comes in for the additional labs.    All other labs within acceptable limits.

## 2021-12-30 DIAGNOSIS — Z86.16 HISTORY OF 2019 NOVEL CORONAVIRUS DISEASE (COVID-19): ICD-10-CM

## 2021-12-30 RX ORDER — ALBUTEROL SULFATE 90 UG/1
AEROSOL, METERED RESPIRATORY (INHALATION)
Qty: 8.5 G | Refills: 1 | Status: SHIPPED | OUTPATIENT
Start: 2021-12-30

## 2022-01-26 ENCOUNTER — HOSPITAL ENCOUNTER (OUTPATIENT)
Dept: NEUROLOGY | Facility: HOSPITAL | Age: 67
Discharge: HOME | End: 2022-01-26
Admitting: NURSE PRACTITIONER

## 2022-01-26 DIAGNOSIS — E11.40 TYPE 2 DIABETES MELLITUS WITH DIABETIC NEUROPATHY, WITHOUT LONG-TERM CURRENT USE OF INSULIN: ICD-10-CM

## 2022-01-26 PROCEDURE — 95886 MUSC TEST DONE W/N TEST COMP: CPT | Performed by: PSYCHIATRY & NEUROLOGY

## 2022-01-26 PROCEDURE — 95913 NRV CNDJ TEST 13/> STUDIES: CPT

## 2022-01-26 PROCEDURE — 95913 NRV CNDJ TEST 13/> STUDIES: CPT | Performed by: PSYCHIATRY & NEUROLOGY

## 2022-01-26 PROCEDURE — 95886 MUSC TEST DONE W/N TEST COMP: CPT

## 2022-02-07 RX ORDER — DICLOFENAC SODIUM 75 MG/1
TABLET, DELAYED RELEASE ORAL
Qty: 180 TABLET | Refills: 0 | Status: SHIPPED | OUTPATIENT
Start: 2022-02-07 | End: 2022-05-12

## 2022-02-08 DIAGNOSIS — G56.03 BILATERAL CARPAL TUNNEL SYNDROME: Primary | ICD-10-CM

## 2022-02-14 ENCOUNTER — TELEPHONE (OUTPATIENT)
Dept: INTERNAL MEDICINE | Facility: CLINIC | Age: 67
End: 2022-02-14

## 2022-02-14 NOTE — TELEPHONE ENCOUNTER
----- Message from GUSTAVO Marcelino sent at 2/8/2022  4:10 PM EST -----  EMG/NCS shows mild bilateral carpal tunnel syndrome.  I will refer to a hand specialist for further evaluation.    Study also shows BLE neuropathy.  We can try a medication called Gabapentin.  I know she has been on this before, but we can try a very low dose if she would like.

## 2022-02-14 NOTE — TELEPHONE ENCOUNTER
LVM to rtn call     HUB TO READ:    EMG/NCS shows mild bilateral carpal tunnel syndrome.  I will refer to a hand specialist for further evaluation.    Study also shows BLE neuropathy.  We can try a medication called Gabapentin.  I know she has been on this before, but we can try a very low dose if she would like.

## 2022-02-15 NOTE — TELEPHONE ENCOUNTER
STEPHENM on personal vm that we would not know the name of the hand specialist and advised the referral dept will call her to get that appt scheduled. And at that time she could tell them as of which one if she prefers one over the other.

## 2022-02-15 NOTE — TELEPHONE ENCOUNTER
Caller: Livier Gutierrez    Relationship: Self    Best call back number: [phone]    What is the best time to reach you: AFTER 9 AND BEFORE 2:30    Who are you requesting to speak with (clinical staff, provider,  specific staff member): CARLOS         What was the call regarding: PATIENT CALLED IN I RELAYED THE HUB TO READ MESSAGE THE PATIENT WOULD LIKE TO KNOW THE NAME OF THE HAND SPECIALIST THAT SHE IS BEING REFERRED TO     Do you require a callback: YES

## 2022-05-12 RX ORDER — DICLOFENAC SODIUM 75 MG/1
TABLET, DELAYED RELEASE ORAL
Qty: 180 TABLET | Refills: 0 | Status: SHIPPED | OUTPATIENT
Start: 2022-05-12 | End: 2022-08-25

## 2022-06-06 ENCOUNTER — OFFICE VISIT (OUTPATIENT)
Dept: INTERNAL MEDICINE | Facility: CLINIC | Age: 67
End: 2022-06-06

## 2022-06-06 ENCOUNTER — LAB (OUTPATIENT)
Dept: LAB | Facility: HOSPITAL | Age: 67
End: 2022-06-06

## 2022-06-06 VITALS
TEMPERATURE: 97 F | SYSTOLIC BLOOD PRESSURE: 128 MMHG | WEIGHT: 230 LBS | HEART RATE: 78 BPM | DIASTOLIC BLOOD PRESSURE: 80 MMHG | HEIGHT: 66 IN | OXYGEN SATURATION: 99 % | BODY MASS INDEX: 36.96 KG/M2 | RESPIRATION RATE: 20 BRPM

## 2022-06-06 DIAGNOSIS — R82.998 LEUKOCYTES IN URINE: ICD-10-CM

## 2022-06-06 DIAGNOSIS — I10 PRIMARY HYPERTENSION: ICD-10-CM

## 2022-06-06 DIAGNOSIS — E55.9 VITAMIN D DEFICIENCY: ICD-10-CM

## 2022-06-06 DIAGNOSIS — E11.9 TYPE 2 DIABETES MELLITUS WITHOUT COMPLICATION, WITHOUT LONG-TERM CURRENT USE OF INSULIN: ICD-10-CM

## 2022-06-06 DIAGNOSIS — D64.9 ANEMIA, UNSPECIFIED TYPE: ICD-10-CM

## 2022-06-06 DIAGNOSIS — E78.5 DYSLIPIDEMIA: ICD-10-CM

## 2022-06-06 DIAGNOSIS — N28.9 DECREASED RENAL FUNCTION: ICD-10-CM

## 2022-06-06 DIAGNOSIS — Z85.850 HISTORY OF THYROID CANCER: ICD-10-CM

## 2022-06-06 DIAGNOSIS — Z00.00 MEDICARE ANNUAL WELLNESS VISIT, SUBSEQUENT: ICD-10-CM

## 2022-06-06 DIAGNOSIS — Z00.00 ANNUAL PHYSICAL EXAM: Primary | ICD-10-CM

## 2022-06-06 DIAGNOSIS — Z00.00 ANNUAL PHYSICAL EXAM: ICD-10-CM

## 2022-06-06 LAB
25(OH)D3 SERPL-MCNC: 69.4 NG/ML (ref 30–100)
ALBUMIN SERPL-MCNC: 4 G/DL (ref 3.5–5.2)
ALBUMIN UR-MCNC: <1.2 MG/DL
ALBUMIN/GLOB SERPL: 1.4 G/DL
ALP SERPL-CCNC: 41 U/L (ref 39–117)
ALT SERPL W P-5'-P-CCNC: 11 U/L (ref 1–33)
ANION GAP SERPL CALCULATED.3IONS-SCNC: 11.7 MMOL/L (ref 5–15)
AST SERPL-CCNC: 14 U/L (ref 1–32)
BASOPHILS # BLD AUTO: 0.05 10*3/MM3 (ref 0–0.2)
BASOPHILS NFR BLD AUTO: 0.7 % (ref 0–1.5)
BILIRUB BLD-MCNC: NEGATIVE MG/DL
BILIRUB SERPL-MCNC: 0.3 MG/DL (ref 0–1.2)
BUN SERPL-MCNC: 20 MG/DL (ref 8–23)
BUN/CREAT SERPL: 17.9 (ref 7–25)
CALCIUM SPEC-SCNC: 8.5 MG/DL (ref 8.6–10.5)
CHLORIDE SERPL-SCNC: 103 MMOL/L (ref 98–107)
CHOLEST SERPL-MCNC: 165 MG/DL (ref 0–200)
CLARITY, POC: CLEAR
CO2 SERPL-SCNC: 27.3 MMOL/L (ref 22–29)
COLOR UR: YELLOW
CREAT SERPL-MCNC: 1.12 MG/DL (ref 0.57–1)
DEPRECATED RDW RBC AUTO: 46.1 FL (ref 37–54)
EGFRCR SERPLBLD CKD-EPI 2021: 53.7 ML/MIN/1.73
EOSINOPHIL # BLD AUTO: 0.24 10*3/MM3 (ref 0–0.4)
EOSINOPHIL NFR BLD AUTO: 3.1 % (ref 0.3–6.2)
ERYTHROCYTE [DISTWIDTH] IN BLOOD BY AUTOMATED COUNT: 16.9 % (ref 12.3–15.4)
EXPIRATION DATE: ABNORMAL
EXPIRATION DATE: ABNORMAL
FERRITIN SERPL-MCNC: 36.7 NG/ML (ref 13–150)
FOLATE SERPL-MCNC: 17.3 NG/ML (ref 4.78–24.2)
GLOBULIN UR ELPH-MCNC: 2.8 GM/DL
GLUCOSE SERPL-MCNC: 86 MG/DL (ref 65–99)
GLUCOSE UR STRIP-MCNC: NEGATIVE MG/DL
HBA1C MFR BLD: 5.8 %
HCT VFR BLD AUTO: 35.9 % (ref 34–46.6)
HDLC SERPL-MCNC: 36 MG/DL (ref 40–60)
HGB BLD-MCNC: 11.3 G/DL (ref 12–15.9)
IMM GRANULOCYTES # BLD AUTO: 0.03 10*3/MM3 (ref 0–0.05)
IMM GRANULOCYTES NFR BLD AUTO: 0.4 % (ref 0–0.5)
IRON 24H UR-MRATE: 32 MCG/DL (ref 37–145)
IRON SATN MFR SERPL: 11 % (ref 20–50)
KETONES UR QL: NEGATIVE
LDLC SERPL CALC-MCNC: 107 MG/DL (ref 0–100)
LDLC/HDLC SERPL: 2.92 {RATIO}
LEUKOCYTE EST, POC: ABNORMAL
LYMPHOCYTES # BLD AUTO: 1.7 10*3/MM3 (ref 0.7–3.1)
LYMPHOCYTES NFR BLD AUTO: 22.1 % (ref 19.6–45.3)
Lab: ABNORMAL
Lab: ABNORMAL
MCH RBC QN AUTO: 23.9 PG (ref 26.6–33)
MCHC RBC AUTO-ENTMCNC: 31.5 G/DL (ref 31.5–35.7)
MCV RBC AUTO: 75.9 FL (ref 79–97)
MONOCYTES # BLD AUTO: 0.66 10*3/MM3 (ref 0.1–0.9)
MONOCYTES NFR BLD AUTO: 8.6 % (ref 5–12)
NEUTROPHILS NFR BLD AUTO: 5.01 10*3/MM3 (ref 1.7–7)
NEUTROPHILS NFR BLD AUTO: 65.1 % (ref 42.7–76)
NITRITE UR-MCNC: NEGATIVE MG/ML
NRBC BLD AUTO-RTO: 0 /100 WBC (ref 0–0.2)
PH UR: 0 [PH] (ref 5–8)
PLATELET # BLD AUTO: 307 10*3/MM3 (ref 140–450)
PMV BLD AUTO: 8.6 FL (ref 6–12)
POTASSIUM SERPL-SCNC: 4.1 MMOL/L (ref 3.5–5.2)
PROT SERPL-MCNC: 6.8 G/DL (ref 6–8.5)
PROT UR STRIP-MCNC: NEGATIVE MG/DL
RBC # BLD AUTO: 4.73 10*6/MM3 (ref 3.77–5.28)
RBC # UR STRIP: NEGATIVE /UL
SODIUM SERPL-SCNC: 142 MMOL/L (ref 136–145)
SP GR UR: 1.01 (ref 1–1.03)
TIBC SERPL-MCNC: 302 MCG/DL (ref 298–536)
TRANSFERRIN SERPL-MCNC: 203 MG/DL (ref 200–360)
TRIGL SERPL-MCNC: 119 MG/DL (ref 0–150)
TSH SERPL DL<=0.05 MIU/L-ACNC: 1.1 UIU/ML (ref 0.27–4.2)
UROBILINOGEN UR QL: NORMAL
VIT B12 BLD-MCNC: >2000 PG/ML (ref 211–946)
VLDLC SERPL-MCNC: 22 MG/DL (ref 5–40)
WBC NRBC COR # BLD: 7.69 10*3/MM3 (ref 3.4–10.8)

## 2022-06-06 PROCEDURE — 87086 URINE CULTURE/COLONY COUNT: CPT | Performed by: NURSE PRACTITIONER

## 2022-06-06 PROCEDURE — 82607 VITAMIN B-12: CPT | Performed by: NURSE PRACTITIONER

## 2022-06-06 PROCEDURE — 82728 ASSAY OF FERRITIN: CPT | Performed by: NURSE PRACTITIONER

## 2022-06-06 PROCEDURE — 82306 VITAMIN D 25 HYDROXY: CPT | Performed by: NURSE PRACTITIONER

## 2022-06-06 PROCEDURE — 85025 COMPLETE CBC W/AUTO DIFF WBC: CPT | Performed by: NURSE PRACTITIONER

## 2022-06-06 PROCEDURE — 1160F RVW MEDS BY RX/DR IN RCRD: CPT | Performed by: NURSE PRACTITIONER

## 2022-06-06 PROCEDURE — 81003 URINALYSIS AUTO W/O SCOPE: CPT | Performed by: NURSE PRACTITIONER

## 2022-06-06 PROCEDURE — 1170F FXNL STATUS ASSESSED: CPT | Performed by: NURSE PRACTITIONER

## 2022-06-06 PROCEDURE — 82043 UR ALBUMIN QUANTITATIVE: CPT | Performed by: NURSE PRACTITIONER

## 2022-06-06 PROCEDURE — 99397 PER PM REEVAL EST PAT 65+ YR: CPT | Performed by: NURSE PRACTITIONER

## 2022-06-06 PROCEDURE — 80053 COMPREHEN METABOLIC PANEL: CPT | Performed by: NURSE PRACTITIONER

## 2022-06-06 PROCEDURE — 80061 LIPID PANEL: CPT | Performed by: NURSE PRACTITIONER

## 2022-06-06 PROCEDURE — 96160 PT-FOCUSED HLTH RISK ASSMT: CPT | Performed by: NURSE PRACTITIONER

## 2022-06-06 PROCEDURE — 3044F HG A1C LEVEL LT 7.0%: CPT | Performed by: NURSE PRACTITIONER

## 2022-06-06 PROCEDURE — 84443 ASSAY THYROID STIM HORMONE: CPT | Performed by: NURSE PRACTITIONER

## 2022-06-06 PROCEDURE — 1126F AMNT PAIN NOTED NONE PRSNT: CPT | Performed by: NURSE PRACTITIONER

## 2022-06-06 PROCEDURE — G0439 PPPS, SUBSEQ VISIT: HCPCS | Performed by: NURSE PRACTITIONER

## 2022-06-06 PROCEDURE — 83036 HEMOGLOBIN GLYCOSYLATED A1C: CPT | Performed by: NURSE PRACTITIONER

## 2022-06-06 PROCEDURE — 84466 ASSAY OF TRANSFERRIN: CPT | Performed by: NURSE PRACTITIONER

## 2022-06-06 PROCEDURE — 83540 ASSAY OF IRON: CPT | Performed by: NURSE PRACTITIONER

## 2022-06-06 PROCEDURE — 82746 ASSAY OF FOLIC ACID SERUM: CPT | Performed by: NURSE PRACTITIONER

## 2022-06-06 NOTE — PATIENT INSTRUCTIONS
"Diabetes Mellitus and Exercise  Exercising regularly is important for overall health, especially for people who have diabetes mellitus. Exercising is not only about losing weight. It has many other health benefits, such as increasing muscle strength and bone density and reducing body fat and stress. This leads to improved fitness, flexibility, and endurance, all of which result in better overall health.  What are the benefits of exercise if I have diabetes?  Exercise has many benefits for people with diabetes. They include:  Helping to lower and control blood sugar (glucose).  Helping the body to respond better to the hormone insulin by improving insulin sensitivity.  Reducing how much insulin the body needs.  Lowering the risk for heart disease by:  Lowering \"bad\" cholesterol and triglyceride levels.  Increasing \"good\" cholesterol levels.  Lowering blood pressure.  Lowering blood glucose levels.  What is my activity plan?  Your health care provider or certified diabetes educator can help you make a plan for the type and frequency of exercise that works for you. This is called your activity plan. Be sure to:  Get at least 150 minutes of medium-intensity or high-intensity exercise each week. Exercises may include brisk walking, biking, or water aerobics.  Do stretching and strengthening exercises, such as yoga or weight lifting, at least 2 times a week.  Spread out your activity over at least 3 days of the week.  Get some form of physical activity each day.  Do not go more than 2 days in a row without some kind of physical activity.  Avoid being inactive for more than 90 minutes at a time. Take frequent breaks to walk or stretch.  Choose exercises or activities that you enjoy. Set realistic goals.  Start slowly and gradually increase your exercise intensity over time.  How do I manage my diabetes during exercise?    Monitor your blood glucose  Check your blood glucose before and after exercising. If your blood " glucose is:  240 mg/dL (13.3 mmol/L) or higher before you exercise, check your urine for ketones. These are chemicals created by the liver. If you have ketones in your urine, do not exercise until your blood glucose returns to normal.  100 mg/dL (5.6 mmol/L) or lower, eat a snack containing 15-20 grams of carbohydrate. Check your blood glucose 15 minutes after the snack to make sure that your glucose level is above 100 mg/dL (5.6 mmol/L) before you start your exercise.  Know the symptoms of low blood glucose (hypoglycemia) and how to treat it. Your risk for hypoglycemia increases during and after exercise.  Follow these tips and your health care provider's instructions  Keep a carbohydrate snack that is fast-acting for use before, during, and after exercise to help prevent or treat hypoglycemia.  Avoid injecting insulin into areas of the body that are going to be exercised. For example, avoid injecting insulin into:  Your arms, when you are about to play tennis.  Your legs, when you are about to go jogging.  Keep records of your exercise habits. Doing this can help you and your health care provider adjust your diabetes management plan as needed. Write down:  Food that you eat before and after you exercise.  Blood glucose levels before and after you exercise.  The type and amount of exercise you have done.  Work with your health care provider when you start a new exercise or activity. He or she may need to:  Make sure that the activity is safe for you.  Adjust your insulin, other medicines, and food that you eat.  Drink plenty of water while you exercise. This prevents loss of water (dehydration) and problems caused by a lot of heat in the body (heat stroke).  Where to find more information  American Diabetes Association: www.diabetes.org  Summary  Exercising regularly is important for overall health, especially for people who have diabetes mellitus.  Exercising has many health benefits. It increases muscle strength  and bone density and reduces body fat and stress. It also lowers and controls blood glucose.  Your health care provider or certified diabetes educator can help you make an activity plan for the type and frequency of exercise that works for you.  Work with your health care provider to make sure any new activity is safe for you. Also work with your health care provider to adjust your insulin, other medicines, and the food you eat.  This information is not intended to replace advice given to you by your health care provider. Make sure you discuss any questions you have with your health care provider.  Document Revised: 09/14/2020 Document Reviewed: 09/14/2020  [employer] Patient Education © 2021 [employer]  Diabetes Mellitus and Nutrition, Adult  When you have diabetes, or diabetes mellitus, it is very important to have healthy eating habits because your blood sugar (glucose) levels are greatly affected by what you eat and drink. Eating healthy foods in the right amounts, at about the same times every day, can help you:  Control your blood glucose.  Lower your risk of heart disease.  Improve your blood pressure.  Reach or maintain a healthy weight.  What can affect my meal plan?  Every person with diabetes is different, and each person has different needs for a meal plan. Your health care provider may recommend that you work with a dietitian to make a meal plan that is best for you. Your meal plan may vary depending on factors such as:  The calories you need.  The medicines you take.  Your weight.  Your blood glucose, blood pressure, and cholesterol levels.  Your activity level.  Other health conditions you have, such as heart or kidney disease.  How do carbohydrates affect me?  Carbohydrates, also called carbs, affect your blood glucose level more than any other type of food. Eating carbs naturally raises the amount of glucose in your blood. Carb counting is a method for keeping track of how many carbs you eat.  "Counting carbs is important to keep your blood glucose at a healthy level, especially if you use insulin or take certain oral diabetes medicines.  It is important to know how many carbs you can safely have in each meal. This is different for every person. Your dietitian can help you calculate how many carbs you should have at each meal and for each snack.  How does alcohol affect me?  Alcohol can cause a sudden decrease in blood glucose (hypoglycemia), especially if you use insulin or take certain oral diabetes medicines. Hypoglycemia can be a life-threatening condition. Symptoms of hypoglycemia, such as sleepiness, dizziness, and confusion, are similar to symptoms of having too much alcohol.  Do not drink alcohol if:  Your health care provider tells you not to drink.  You are pregnant, may be pregnant, or are planning to become pregnant.  If you drink alcohol:  Do not drink on an empty stomach.  Limit how much you use to:  0-1 drink a day for women.  0-2 drinks a day for men.  Be aware of how much alcohol is in your drink. In the [place] one drink equals one 12 oz bottle of beer (355 mL), one 5 oz glass of wine (148 mL), or one 1½ oz glass of hard liquor (44 mL).  Keep yourself hydrated with water, diet soda, or unsweetened iced tea.  Keep in mind that regular soda, juice, and other mixers may contain a lot of sugar and must be counted as carbs.  What are tips for following this plan?    Reading food labels  Start by checking the serving size on the \"Nutrition Facts\" label of packaged foods and drinks. The amount of calories, carbs, fats, and other nutrients listed on the label is based on one serving of the item. Many items contain more than one serving per package.  Check the total grams (g) of carbs in one serving. You can calculate the number of servings of carbs in one serving by dividing the total carbs by 15. For example, if a food has 30 g of total carbs per serving, it would be equal to 2 servings of " "carbs.  Check the number of grams (g) of saturated fats and trans fats in one serving. Choose foods that have a low amount or none of these fats.  Check the number of milligrams (mg) of salt (sodium) in one serving. Most people should limit total sodium intake to less than 2,300 mg per day.  Always check the nutrition information of foods labeled as \"low-fat\" or \"nonfat.\" These foods may be higher in added sugar or refined carbs and should be avoided.  Talk to your dietitian to identify your daily goals for nutrients listed on the label.  Shopping  Avoid buying canned, pre-made, or processed foods. These foods tend to be high in fat, sodium, and added sugar.  Shop around the outside edge of the grocery store. This is where you will most often find fresh fruits and vegetables, bulk grains, fresh meats, and fresh dairy.  Cooking  Use low-heat cooking methods, such as baking, instead of high-heat cooking methods like deep frying.  Cook using healthy oils, such as olive, canola, or sunflower oil.  Avoid cooking with butter, cream, or high-fat meats.  Meal planning  Eat meals and snacks regularly, preferably at the same times every day. Avoid going long periods of time without eating.  Eat foods that are high in fiber, such as fresh fruits, vegetables, beans, and whole grains. Talk with your dietitian about how many servings of carbs you can eat at each meal.  Eat 4-6 oz (112-168 g) of lean protein each day, such as lean meat, chicken, fish, eggs, or tofu. One ounce (oz) of lean protein is equal to:  1 oz (28 g) of meat, chicken, or fish.  1 egg.  ¼ cup (62 g) of tofu.  Eat some foods each day that contain healthy fats, such as avocado, nuts, seeds, and fish.  What foods should I eat?  Fruits  Berries. Apples. Oranges. Peaches. Apricots. Plums. Grapes. [place]. Papaya. Pomegranate. Kiwi. Cherries.  Vegetables  Lettuce. Spinach. Leafy greens, including kale, chard, ricardo greens, and mustard greens. Beets. Cauliflower. " Cabbage. Broccoli. Carrots. Green beans. Tomatoes. Peppers. Onions. Cucumbers. Leigh sprouts.  Grains  Whole grains, such as whole-wheat or whole-grain bread, crackers, tortillas, cereal, and pasta. Unsweetened oatmeal. Quinoa. Brown or wild rice.  Meats and other proteins  Seafood. Poultry without skin. Lean cuts of poultry and beef. Tofu. Nuts. Seeds.  Dairy  Low-fat or fat-free dairy products such as milk, yogurt, and cheese.  The items listed above may not be a complete list of foods and beverages you can eat. Contact a dietitian for more information.  What foods should I avoid?  Fruits  Fruits canned with syrup.  Vegetables  Canned vegetables. Frozen vegetables with butter or cream sauce.  Grains  Refined white flour and flour products such as bread, pasta, snack foods, and cereals. Avoid all processed foods.  Meats and other proteins  Fatty cuts of meat. Poultry with skin. Breaded or fried meats. Processed meat. Avoid saturated fats.  Dairy  Full-fat yogurt, cheese, or milk.  Beverages  Sweetened drinks, such as soda or iced tea.  The items listed above may not be a complete list of foods and beverages you should avoid. Contact a dietitian for more information.  Questions to ask a health care provider  Do I need to meet with a diabetes educator?  Do I need to meet with a dietitian?  What number can I call if I have questions?  When are the best times to check my blood glucose?  Where to find more information:  American Diabetes Association: diabetes.org  Academy of Nutrition and Dietetics: www.eatright.org  National [place] of Diabetes and Digestive and Kidney Diseases: www.niddk.nih.gov  Association of Diabetes Care and Education Specialists: www.diabeteseducator.org  Summary  It is important to have healthy eating habits because your blood sugar (glucose) levels are greatly affected by what you eat and drink.  A healthy meal plan will help you control your blood glucose and maintain a healthy  lifestyle.  Your health care provider may recommend that you work with a dietitian to make a meal plan that is best for you.  Keep in mind that carbohydrates (carbs) and alcohol have immediate effects on your blood glucose levels. It is important to count carbs and to use alcohol carefully.  This information is not intended to replace advice given to you by your health care provider. Make sure you discuss any questions you have with your health care provider.  Document Revised: 11/24/2020 Document Reviewed: 11/24/2020  [employer] Patient Education © 2021 [employer]

## 2022-06-06 NOTE — PROGRESS NOTES
The ABCs of the Annual Wellness Visit  Subsequent Medicare Wellness Visit    Chief Complaint   Patient presents with   • Medicare Wellness-subsequent      Subjective    History of Present Illness:  Livier Gutierrez is a 66 y.o. female who presents for a Subsequent Medicare Wellness Visit.    The following portions of the patient's history were reviewed and   updated as appropriate: allergies, current medications, past family history, past medical history, past social history, past surgical history and problem list.    Compared to one year ago, the patient feels her physical   health is better.    Compared to one year ago, the patient feels her mental   health is the same.    Recent Hospitalizations:  She was not admitted to the hospital during the last year.       Current Medical Providers:  Patient Care Team:  Tracye Smith APRN as PCP - General (Family Medicine)  Mark Anthony Winslow MD as Consulting Physician (Ophthalmology)    Outpatient Medications Prior to Visit   Medication Sig Dispense Refill   • acetaminophen (TYLENOL) 325 MG tablet Take 2 tablets by mouth Every 4 (Four) Hours As Needed for Mild Pain .     • albuterol sulfate  (90 Base) MCG/ACT inhaler INHALE TWO PUFFS BY MOUTH EVERY 4 HOURS AS NEEDED FOR WHEEZING 8.5 g 1   • atenolol (TENORMIN) 25 MG tablet TAKE ONE TABLET BY MOUTH DAILY 90 tablet 0   • BLACK CURRANT SEED OIL PO Take  by mouth 2 (Two) Times a Day.     • budesonide-formoterol (SYMBICORT) 80-4.5 MCG/ACT inhaler Inhale 2 puffs 2 (Two) Times a Day. 1 inhaler 5   • cholecalciferol (VITAMIN D3) 400 units tablet Take 50 Units by mouth Daily.     • Coenzyme Q10 (COQ10 PO) Take  by mouth Daily.     • diclofenac (VOLTAREN) 75 MG EC tablet TAKE ONE TABLET BY MOUTH TWICE A DAY AS NEEDED FOR PAIN 180 tablet 0   • ezetimibe (Zetia) 10 MG tablet Take 1 tablet by mouth Daily. 90 tablet 1   • levothyroxine (SYNTHROID, LEVOTHROID) 150 MCG tablet Take 150 mcg by mouth daily.     • losartan  (COZAAR) 25 MG tablet Take 1 tablet by mouth Daily. 90 tablet 1   • omeprazole (priLOSEC) 20 MG capsule TAKE ONE CAPSULE BY MOUTH TWICE A DAY WITH DICLOFENAC 180 capsule 1   • triamterene-hydrochlorothiazide (DYAZIDE) 37.5-25 MG per capsule Take 1 capsule by mouth Daily.     • Unable to find 1 each 1 (One) Time. Med Name: Beet Root     • vitamin B-12 (CYANOCOBALAMIN) 1000 MCG tablet Take 1,000 mcg by mouth 2 (Two) Times a Day.     • vitamin C (ASCORBIC ACID) 500 MG tablet Take 2,000 mg by mouth Daily.     • glucose blood test strip 1 po bid as directed- One touch ultra Blue 100 100 each 12   • glucose monitor monitoring kit 1 each Daily. 1 each 0   • Lancets misc 1 each Daily. 60 each 11     No facility-administered medications prior to visit.       No opioid medication identified on active medication list. I have reviewed chart for other potential  high risk medication/s and harmful drug interactions in the elderly.          Aspirin is not on active medication list.  Aspirin use is not indicated based on review of current medical condition/s. Risk of harm outweighs potential benefits.  .    Patient Active Problem List   Diagnosis   • History of thyroid cancer   • Dyslipidemia   • Hypertension   • Allergic   • Joint pain   • Malignant neoplasm of thyroid gland (HCC)   • Allergic rhinitis   • Regional enteritis (HCC)   • Cellulitis   • Exertional dyspnea   • Skin lesion   • Primary osteoarthritis of both knees   • Morbidly obese (HCC)   • Pneumonia due to COVID-19 virus   • DM2 (diabetes mellitus, type 2) (HCC)   • Sarcoidosis   • Hypoxic   • Hurthle cell carcinoma (HCC)   • Papillary thyroid carcinoma (HCC)   • Sickle cell trait (HCC)     Advance Care Planning  Advance Directive is not on file.  ACP discussion was held with the patient during this visit. Patient does not have an advance directive, information provided.    Review of Systems   Musculoskeletal: Positive for arthralgias.   All other systems reviewed and  "are negative.    DM II - does not ck FS @ home; Has been off of Metformin for 6+ months now.  A1C today is 5.8% today which is stable from last check     Has seen podiatry in the past for BLE N/T.  An EMG of her LE's was ordered for BLE pain, N/T.  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  She was started on Gabapentin, but she did not tolerate, so she stopped this.        HTN - chronic and stable on Cozaar, Atenolol and HCTZ.  Denies medication SE's.  BP looks great today!      HL - she stopped the lovastatin due to myalgias.  Then tried Zetia and she states that she had hand cramps and stopped this med    H/O thyroid CA - managed by oncologist at  currently has her on 150mcg; Let her go to the [facility]. She is now seeing her every 6 months.  Next f/u is July 2021      GERD is chronic and stable on Omeprazole, but only takes it PRN      Allergies are doing ok.  Not currently taking any antihistamines, but has been using her albuterol more often.      Bilateral knee pain - Livier underwent bilateral knee replacements since her last visit per Dr. Bruno; right 4/29/19 and left 3/12/2019.  States that her knees are doing great!             Objective    Vitals:    06/06/22 0851   BP: 128/80   Cuff Size: Large Adult   Pulse: 78   Resp: 20   Temp: 97 °F (36.1 °C)   TempSrc: Infrared   SpO2: 99%   Weight: 104 kg (230 lb)   Height: 167.6 cm (66\")   PainSc: 0-No pain     Body mass index is 37.12 kg/m².  Class 2 Severe Obesity (BMI >=35 and <=39.9). Obesity-related health conditions include the following: hypertension, diabetes mellitus, dyslipidemias and osteoarthritis. Obesity is improving with lifestyle modifications. BMI is is above average; BMI management plan is completed. We discussed portion control and increasing exercise.    Does the patient have evidence of cognitive impairment? No    Physical Exam  Constitutional:       Appearance: She is well-developed.   HENT:      Head: Normocephalic " and atraumatic.   Eyes:      Conjunctiva/sclera: Conjunctivae normal.      Pupils: Pupils are equal, round, and reactive to light.   Cardiovascular:      Rate and Rhythm: Normal rate and regular rhythm.      Heart sounds: Normal heart sounds.   Pulmonary:      Effort: Pulmonary effort is normal.      Breath sounds: Normal breath sounds.   Abdominal:      General: Bowel sounds are normal.      Palpations: Abdomen is soft.   Musculoskeletal:         General: Normal range of motion.      Cervical back: Normal range of motion.   Skin:     General: Skin is warm and dry.   Neurological:      Mental Status: She is alert and oriented to person, place, and time.      Deep Tendon Reflexes: Reflexes are normal and symmetric.   Psychiatric:         Behavior: Behavior normal.         Thought Content: Thought content normal.         Judgment: Judgment normal.       Lab Results   Component Value Date    HGBA1C 5.8 2022            HEALTH RISK ASSESSMENT    Smoking Status:  Social History     Tobacco Use   Smoking Status Former Smoker   • Packs/day: 0.10   • Years: 22.00   • Pack years: 2.20   • Types: Cigarettes   • Start date:    • Quit date:    • Years since quittin.4   Smokeless Tobacco Never Used     Alcohol Consumption:  Social History     Substance and Sexual Activity   Alcohol Use No     Fall Risk Screen:    [facility] Fall Risk Assessment has not been completed.    Depression Screening:  PHQ-2/PHQ-9 Depression Screening 2022   Retired PHQ-9 Total Score -   Retired Total Score -   Little Interest or Pleasure in Doing Things 0-->not at all   Feeling Down, Depressed or Hopeless 0-->not at all   PHQ-9: Brief Depression Severity Measure Score 0       Health Habits and Functional and Cognitive Screening:  Functional & Cognitive Status 2022   Do you have difficulty preparing food and eating? No   Do you have difficulty bathing yourself, getting dressed or grooming yourself? No   Do you have difficulty using  the toilet? No   Do you have difficulty moving around from place to place? No   Do you have trouble with steps or getting out of a bed or a chair? No   Current Diet Well Balanced Diet   Dental Exam Not up to date        Dental Exam Comment 2021   Eye Exam Up to date        Eye Exam Comment 1/2022   Exercise (times per week) 4 times per week   Current Exercises Include Boxing;Bicycling Outdoors   Current Exercise Activities Include -   Do you need help using the phone?  No   Are you deaf or do you have serious difficulty hearing?  No   Do you need help with transportation? No   Do you need help shopping? No   Do you need help preparing meals?  No   Do you need help with housework?  Yes   Do you need help with laundry? No   Do you need help taking your medications? No   Do you need help managing money? No   Do you ever drive or ride in a car without wearing a seat belt? No   Have you felt unusual stress, anger or loneliness in the last month? No   Who do you live with? Child   If you need help, do you have trouble finding someone available to you? No   Have you been bothered in the last four weeks by sexual problems? No   Do you have difficulty concentrating, remembering or making decisions? No       Age-appropriate Screening Schedule:  Refer to the list below for future screening recommendations based on patient's age, sex and/or medical conditions. Orders for these recommended tests are listed in the plan section. The patient has been provided with a written plan.    Health Maintenance   Topic Date Due   • DIABETIC EYE EXAM  01/01/2022   • TDAP/TD VACCINES (2 - Td or Tdap) 01/01/2022   • DIABETIC FOOT EXAM  05/18/2022   • URINE MICROALBUMIN  05/25/2022   • INFLUENZA VACCINE  08/01/2022   • LIPID PANEL  11/23/2022   • HEMOGLOBIN A1C  12/06/2022   • DXA SCAN  08/25/2023   • MAMMOGRAM  12/29/2023   • PAP SMEAR  Discontinued   • ZOSTER VACCINE  Discontinued              Assessment & Plan   CMS Preventative Services  Quick Reference  Risk Factors Identified During Encounter  Inactivity/Sedentary  Obesity/Overweight   The above risks/problems have been discussed with the patient.  Follow up actions/plans if indicated are seen below in the Assessment/Plan Section.  Pertinent information has been shared with the patient in the After Visit Summary.    COVID - 10/21/2021 and 11/16/2021  Flu shot - updating today  Pneumovax 10/13, 2/1/2019  Zostavax, declines  Prevnar - 4/19/16  Tdap 2012   DEXA - 3/6/2019, ordered  Ovarian Ca screening; [facility]   Mamm 12/28/2020  Pap - 7/2014; no longer doing ([facility])  Colon 2016, 4/2021  Eye exam - 1/2021 at   Foot exam 5/2021 per podiatry - goes Q8-9 weeks for nail trim and ck      Diagnoses and all orders for this visit:    1. Annual physical exam (Primary)  -     POCT urinalysis dipstick, automated  -     CBC & Differential; Future  -     Comprehensive Metabolic Panel; Future  -     Lipid Panel; Future  -     TSH; Future  -     Vitamin B12; Future  -     Vitamin D 25 Hydroxy; Future    2. Medicare annual wellness visit, subsequent  -     POCT urinalysis dipstick, automated  -     CBC & Differential; Future  -     Comprehensive Metabolic Panel; Future  -     Lipid Panel; Future  -     TSH; Future  -     Vitamin B12; Future  -     Vitamin D 25 Hydroxy; Future    3. Type 2 diabetes mellitus without complication, without long-term current use of insulin (HCC)  -     MicroAlbumin, Urine, Random - Urine, Clean Catch; Future  -     POC Glycosylated Hemoglobin (Hb A1C)  -     CBC & Differential; Future  -     Comprehensive Metabolic Panel; Future  -     Lipid Panel; Future  -     TSH; Future  -     Vitamin B12; Future  -     Vitamin D 25 Hydroxy; Future    4. Leukocytes in urine  -     Urine Culture - Urine, Urine, Clean Catch; Future    5. Dyslipidemia  -     CBC & Differential; Future  -     Comprehensive Metabolic Panel; Future  -     Lipid Panel; Future  -     TSH; Future  -     Vitamin B12; Future  -      Vitamin D 25 Hydroxy; Future    6. Primary hypertension  -     CBC & Differential; Future  -     Comprehensive Metabolic Panel; Future  -     Lipid Panel; Future  -     TSH; Future  -     Vitamin B12; Future  -     Vitamin D 25 Hydroxy; Future    7. History of thyroid cancer  -     CBC & Differential; Future  -     Comprehensive Metabolic Panel; Future  -     Lipid Panel; Future  -     TSH; Future  -     Vitamin B12; Future  -     Vitamin D 25 Hydroxy; Future    8. Vitamin D deficiency  -     CBC & Differential; Future  -     Comprehensive Metabolic Panel; Future  -     Lipid Panel; Future  -     TSH; Future  -     Vitamin B12; Future  -     Vitamin D 25 Hydroxy; Future      Counseling - diet and exercise    Follow Up:   Return in about 6 months (around 12/6/2022) for f/u, collect labs today.     An After Visit Summary and PPPS were made available to the patient.

## 2022-06-07 LAB — BACTERIA SPEC AEROBE CULT: NORMAL

## 2022-06-20 ENCOUNTER — TELEPHONE (OUTPATIENT)
Dept: INTERNAL MEDICINE | Facility: CLINIC | Age: 67
End: 2022-06-20

## 2022-06-20 NOTE — TELEPHONE ENCOUNTER
LVM TO RTN CALL    HUB TO READ:    Please let patient know that her iron is low on recent labs.  Is she currently taking an iron supplement?    All other labs are stable and within acceptable limits.

## 2022-06-20 NOTE — TELEPHONE ENCOUNTER
----- Message from GUSTAVO Marcelino sent at 6/20/2022  9:40 AM EDT -----  Please let patient know that her iron is low on recent labs.  Is she currently taking an iron supplement?    All other labs are stable and within acceptable limits.

## 2022-06-21 NOTE — TELEPHONE ENCOUNTER
PATIENT STATES SHE HAS 65 MG OF IRON SULFATE SHE TAKES SOMETIMES BUT NOT ALL THE TIME. PATIENT STATES SHE WILL BEGIN TAKING SUPPLEMENT EVERYDAY.

## 2022-06-28 ENCOUNTER — TELEPHONE (OUTPATIENT)
Dept: INTERNAL MEDICINE | Facility: CLINIC | Age: 67
End: 2022-06-28

## 2022-06-28 DIAGNOSIS — Z23 COVID-19 VACCINE ADMINISTERED: Primary | ICD-10-CM

## 2022-07-21 ENCOUNTER — TELEPHONE (OUTPATIENT)
Dept: INTERNAL MEDICINE | Facility: CLINIC | Age: 67
End: 2022-07-21

## 2022-07-21 NOTE — TELEPHONE ENCOUNTER
Caller: Livier Gutierrez    Relationship: Self    Best call back number: [phone]    Requested Prescriptions:   Requested Prescriptions     Pending Prescriptions Disp Refills   • budesonide-formoterol (SYMBICORT) 80-4.5 MCG/ACT inhaler 1 each 5     Sig: Inhale 2 puffs 2 (Two) Times a Day.        Pharmacy where request should be sent: [facility] [address] [phone] [facility] [phone] [facility]     Additional details provided by patient:     Does the patient have less than a 3 day supply:  [x] Yes  [] No    Mat Flores Rep   07/21/22 15:39 EDT

## 2022-07-21 NOTE — TELEPHONE ENCOUNTER
Please call Meme Kelsey with [facility] regarding patient she is doing some wheezing and Meme wanted to talk with someone [phone]

## 2022-07-22 RX ORDER — BUDESONIDE AND FORMOTEROL FUMARATE DIHYDRATE 80; 4.5 UG/1; UG/1
2 AEROSOL RESPIRATORY (INHALATION)
Qty: 1 EACH | Refills: 5 | Status: SHIPPED | OUTPATIENT
Start: 2022-07-22

## 2022-07-22 NOTE — TELEPHONE ENCOUNTER
Spoke with Meme, she said that yesterday in office the patient was wheezing upon inspiration and exhalation during her exam. She notified the provider that she was using her rescue in haler about 4-6 times a day. The provider stated that her lungs were tight and may need a long acting inhaler. She was advised to call the office to schedule but the patient hadn't called in yet. Please advise.

## 2022-07-22 NOTE — TELEPHONE ENCOUNTER
Looks like there was a refill request pending on symbicort that she has been on in the past, so I sent that in to [facility]

## 2022-07-22 NOTE — TELEPHONE ENCOUNTER
[facility] for the patient to return our call, office number was provided      HUB TO READ: Please let the patient know that we refilled her long acting inhaler, Symbicort and she needs to pick this up and use it in stead of using her rescue inhaler so much.

## 2022-08-25 RX ORDER — DICLOFENAC SODIUM 75 MG/1
TABLET, DELAYED RELEASE ORAL
Qty: 180 TABLET | Refills: 0 | Status: SHIPPED | OUTPATIENT
Start: 2022-08-25 | End: 2022-12-27

## 2022-12-15 NOTE — TELEPHONE ENCOUNTER
Patient still has order active, and encounter remains open in my messages. Has this issue been resolved?

## 2022-12-15 NOTE — TELEPHONE ENCOUNTER
Pt was notified that she can return to the clinic during nurse visit hours and have immunization.

## 2022-12-27 RX ORDER — DICLOFENAC SODIUM 75 MG/1
TABLET, DELAYED RELEASE ORAL
Qty: 180 TABLET | Refills: 0 | Status: SHIPPED | OUTPATIENT
Start: 2022-12-27

## 2024-09-19 ENCOUNTER — TRANSCRIBE ORDERS (OUTPATIENT)
Dept: GENERAL RADIOLOGY | Facility: HOSPITAL | Age: 69
End: 2024-09-19
Payer: MEDICARE

## 2024-09-19 DIAGNOSIS — D86.9 SARCOIDOSIS: Primary | ICD-10-CM

## 2024-10-04 ENCOUNTER — OFFICE VISIT (OUTPATIENT)
Age: 69
End: 2024-10-04
Payer: MEDICARE

## 2024-10-04 ENCOUNTER — LAB (OUTPATIENT)
Facility: HOSPITAL | Age: 69
End: 2024-10-04
Payer: MEDICARE

## 2024-10-04 VITALS
HEART RATE: 87 BPM | WEIGHT: 243.8 LBS | OXYGEN SATURATION: 96 % | SYSTOLIC BLOOD PRESSURE: 122 MMHG | DIASTOLIC BLOOD PRESSURE: 80 MMHG | BODY MASS INDEX: 39.18 KG/M2 | TEMPERATURE: 98.6 F | RESPIRATION RATE: 18 BRPM | HEIGHT: 66 IN

## 2024-10-04 DIAGNOSIS — D86.9 SARCOIDOSIS: ICD-10-CM

## 2024-10-04 DIAGNOSIS — J30.9 ALLERGIC RHINITIS, UNSPECIFIED SEASONALITY, UNSPECIFIED TRIGGER: ICD-10-CM

## 2024-10-04 DIAGNOSIS — D86.9 SARCOIDOSIS: Primary | ICD-10-CM

## 2024-10-04 PROCEDURE — 86003 ALLG SPEC IGE CRUDE XTRC EA: CPT

## 2024-10-04 PROCEDURE — 82103 ALPHA-1-ANTITRYPSIN TOTAL: CPT

## 2024-10-04 PROCEDURE — 36415 COLL VENOUS BLD VENIPUNCTURE: CPT

## 2024-10-04 PROCEDURE — 82104 ALPHA-1-ANTITRYPSIN PHENO: CPT

## 2024-10-04 PROCEDURE — 82164 ANGIOTENSIN I ENZYME TEST: CPT

## 2024-10-04 RX ORDER — IBUPROFEN 200 MG
CAPSULE ORAL DAILY
COMMUNITY
End: 2024-10-04

## 2024-10-04 RX ORDER — AMLODIPINE BESYLATE 10 MG/1
TABLET ORAL
COMMUNITY
End: 2024-10-04 | Stop reason: DRUGHIGH

## 2024-10-04 RX ORDER — HYDROCHLOROTHIAZIDE 25 MG/1
TABLET ORAL
COMMUNITY
End: 2024-10-04

## 2024-10-04 RX ORDER — MULTIVIT WITH MINERALS/LUTEIN
1000 TABLET ORAL DAILY
COMMUNITY

## 2024-10-04 RX ORDER — MELOXICAM 15 MG/1
TABLET ORAL
COMMUNITY
End: 2024-10-04

## 2024-10-04 RX ORDER — FERROUS SULFATE 325(65) MG
325 TABLET ORAL
COMMUNITY

## 2024-10-04 RX ORDER — ROSUVASTATIN CALCIUM 20 MG/1
TABLET, COATED ORAL
COMMUNITY
End: 2024-10-04

## 2024-10-04 RX ORDER — AMLODIPINE BESYLATE 5 MG/1
10 TABLET ORAL DAILY
COMMUNITY

## 2024-10-04 NOTE — PROGRESS NOTES
[facility], [place], operated by [facility] Initial Evaluation    CHIEF COMPLAINT    Sarcoidosis    Referred by:  Sandy Mueller MD    HISTORY OF PRESENT ILLNESS    Livier Gutierrez is a 69 y.o.female here today for an initial valuation of her sarcoidosis.  She was diagnosed with sarcoidosis around 2020.  She is not sure of the exact date.  She did have a lung biopsy to confirm these findings.  She was on prednisone for about a year and then transitioned off of this.  She has had no remission to this date.    She has not followed with pulmonary in quite a while.  She is here to establish care.    She has noticed more shortness of breath over the last few months.  She states that her shortness of breath has improved since this time.  She does try to stay active and exercises regularly at the gym 3 days a week.    She denies any childhood asthma.  She denies any exposure to TB.  She denies any chemical or environmental exposures in the past.  She quit smoking 33 years ago.  She denies any history of lung cancer in her first-degree relatives.    She has worked as a  in the [employer].    She has noticed perfumes will affect her breathing.  She has been on Symbicort for quite a while.  She does take this twice a day.  She also has albuterol to use as needed for shortness of breath or wheezing.    She does have seasonal allergies and will take medication as needed.  She typically does not produce sputum.  If she does it is clear.  She denies any hemoptysis.  She denies any fever, chills or night sweats.  She denies having any antibiotics or steroids in the last 3 months.    She does occasionally have reflux symptoms and will take her meds as needed.  She does not have the symptoms frequently.    She sleeps well.  She denies any sleeping difficulties.  She has had a home sleep study in the past that was negative for KARTHIK.    Patient Active Problem List   Diagnosis    History of thyroid cancer    Dyslipidemia    Hypertension     Allergic    Joint pain    Malignant neoplasm of thyroid gland    Allergic rhinitis    Regional enteritis    Cellulitis    Exertional dyspnea    Skin lesion    Primary osteoarthritis of both knees    Morbidly obese    Pneumonia due to COVID-19 virus    DM2 (diabetes mellitus, type 2)    Sarcoidosis    Hypoxic    Hurthle cell carcinoma    Papillary thyroid carcinoma    Sickle cell trait       Allergies   Allergen Reactions    Latex Angioedema    Penicillins Swelling    Aspirin Unknown - Low Severity and Other (See Comments)       Current Outpatient Medications:     acetaminophen (TYLENOL) 325 MG tablet, Take 2 tablets by mouth Every 4 (Four) Hours As Needed for Mild Pain ., Disp: , Rfl:     albuterol sulfate  (90 Base) MCG/ACT inhaler, INHALE TWO PUFFS BY MOUTH EVERY 4 HOURS AS NEEDED FOR WHEEZING, Disp: 8.5 g, Rfl: 1    amLODIPine (NORVASC) 5 MG tablet, Take 2 tablets by mouth Daily., Disp: , Rfl:     budesonide-formoterol (SYMBICORT) 80-4.5 MCG/ACT inhaler, Inhale 2 puffs 2 (Two) Times a Day., Disp: 1 each, Rfl: 5    cholecalciferol (VITAMIN D3) 400 units tablet, Take 50 Units by mouth Daily., Disp: , Rfl:     Coenzyme Q10 (COQ10 PO), Take  by mouth Daily., Disp: , Rfl:     ferrous sulfate 325 (65 FE) MG tablet, Take 1 tablet by mouth Daily With Breakfast., Disp: , Rfl:     glucose blood test strip, 1 po bid as directed- One touch ultra Blue 100, Disp: 100 each, Rfl: 12    glucose monitor monitoring kit, 1 each Daily., Disp: 1 each, Rfl: 0    Lancets misc, 1 each Daily., Disp: 60 each, Rfl: 11    levothyroxine (SYNTHROID, LEVOTHROID) 150 MCG tablet, Take 1 tablet by mouth Daily., Disp: , Rfl:     losartan (COZAAR) 25 MG tablet, Take 1 tablet by mouth Daily., Disp: 90 tablet, Rfl: 1    omeprazole (priLOSEC) 20 MG capsule, TAKE ONE CAPSULE BY MOUTH TWICE A DAY WITH DICLOFENAC, Disp: 180 capsule, Rfl: 1    Unable to find, 1 each 1 (One) Time. Med Name: Beet Root, Disp: , Rfl:     vitamin B-12 (CYANOCOBALAMIN)  1000 MCG tablet, Take 1 tablet by mouth 2 (Two) Times a Day., Disp: , Rfl:     vitamin C (ASCORBIC ACID) 500 MG tablet, Take 4 tablets by mouth Daily., Disp: , Rfl:     VITAMIN E 1000 UNIT capsule, Take 1 capsule by mouth Daily., Disp: , Rfl:     atenolol (TENORMIN) 25 MG tablet, TAKE ONE TABLET BY MOUTH DAILY (Patient not taking: Reported on 10/4/2024), Disp: 90 tablet, Rfl: 0    BLACK CURRANT SEED OIL PO, Take  by mouth 2 (Two) Times a Day. (Patient not taking: Reported on 10/4/2024), Disp: , Rfl:     ezetimibe (Zetia) 10 MG tablet, Take 1 tablet by mouth Daily. (Patient not taking: Reported on 10/4/2024), Disp: 90 tablet, Rfl: 1  MEDICATION LIST AND ALLERGIES REVIEWED.    Social History     Tobacco Use    Smoking status: Former     Current packs/day: 0.00     Average packs/day: 0.1 packs/day for 22.0 years (2.2 ttl pk-yrs)     Types: Cigarettes     Start date:      Quit date:      Years since quittin.7    Smokeless tobacco: Never   Vaping Use    Vaping status: Never Used   Substance Use Topics    Alcohol use: No    Drug use: No       FAMILY AND SOCIAL HISTORY REVIEWED.    Review of Systems   Constitutional:  Negative for activity change, appetite change, fatigue, fever and unexpected weight change.   HENT:  Negative for congestion, postnasal drip, rhinorrhea, sinus pressure, sore throat and voice change.    Eyes:  Negative for visual disturbance.   Respiratory:  Positive for shortness of breath. Negative for cough, chest tightness and wheezing.    Cardiovascular:  Negative for chest pain, palpitations and leg swelling.   Gastrointestinal:  Negative for abdominal distention, abdominal pain, nausea and vomiting.   Endocrine: Negative for cold intolerance and heat intolerance.   Genitourinary:  Negative for difficulty urinating and urgency.   Musculoskeletal:  Negative for arthralgias, back pain and neck pain.   Skin:  Negative for color change and pallor.   Allergic/Immunologic: Negative for  "environmental allergies and food allergies.   Neurological:  Negative for dizziness, syncope, weakness and light-headedness.   Hematological:  Negative for adenopathy. Does not bruise/bleed easily.   Psychiatric/Behavioral:  Negative for agitation and behavioral problems.    .    /80 (BP Location: Left arm, Patient Position: Sitting, Cuff Size: Adult)   Pulse 87   Temp 98.6 °F (37 °C) (Oral)   Resp 18   Ht 167.6 cm (66\")   Wt 111 kg (243 lb 12.8 oz)   LMP  (LMP Unknown)   SpO2 96% Comment: room air resting  BMI 39.35 kg/m²     Immunization History   Administered Date(s) Administered    COVID-19 (PFIZER) BIVALENT 12+YRS 02/08/2023    COVID-19 (PFIZER) Purple Cap Monovalent 10/21/2021, 11/16/2021    Fluad Quad 65+ 11/17/2020    Fluzone High-Dose 65+YRS 02/01/2019, 10/04/2019    Fluzone High-Dose 65+yrs 11/23/2021    Fluzone Quad >6mos (Multi-dose) 10/18/2016    Influenza, Unspecified 10/01/2015    Pneumococcal Conjugate 13-Valent (PCV13) 04/19/2016    Pneumococcal Polysaccharide (PPSV23) 01/01/2016, 02/01/2019       Physical Exam  Vitals and nursing note reviewed.   Constitutional:       Appearance: She is well-developed. She is not diaphoretic.   HENT:      Head: Normocephalic and atraumatic.   Eyes:      Pupils: Pupils are equal, round, and reactive to light.   Neck:      Thyroid: No thyromegaly.   Cardiovascular:      Rate and Rhythm: Normal rate and regular rhythm.      Heart sounds: Normal heart sounds. No murmur heard.     No friction rub. No gallop.   Pulmonary:      Effort: Pulmonary effort is normal. No respiratory distress.      Breath sounds: Normal breath sounds. No wheezing or rales.   Chest:      Chest wall: No tenderness.   Abdominal:      General: Bowel sounds are normal.      Palpations: Abdomen is soft.      Tenderness: There is no abdominal tenderness.   Musculoskeletal:         General: No swelling. Normal range of motion.      Cervical back: Normal range of motion and neck supple. "   Lymphadenopathy:      Cervical: No cervical adenopathy.   Skin:     General: Skin is warm and dry.      Capillary Refill: Capillary refill takes less than 2 seconds.   Neurological:      Mental Status: She is alert and oriented to person, place, and time.   Psychiatric:         Mood and Affect: Mood normal.         Behavior: Behavior normal.           RESULTS    Spirometry Interpretation: FVC 2.53 98% predicted, FEV1 1.99 100% predicted, FEV1/FVC 79% predicted, TLC 5.65 106% predicted, DLCO 87% predicted, no obstruction, no restriction and slight reduction in DLCO.    6-minute walk test: Patient ambulated 950 feet and desaturated to 84% through testing, she does meet qualifications for oxygen with activity.  Her percent of predicted was 81%.    Chest PA/lateral: Official report pending.    PROBLEM LIST    Problem List Items Addressed This Visit          Allergies and Adverse Reactions    Allergic rhinitis    Relevant Orders    Allergens, Zone 8       Multi-system (Lupus, Sarcoid...)    Sarcoidosis - Primary    Relevant Orders    XR Chest PA & Lateral    Alpha - 1 - Antitrypsin    Alpha - 1 - Antitrypsin Phenotype    Angiotensin Converting Enzyme    Oxygen Therapy         DISCUSSION    Ms. Gutierrez was here for initial evaluation of her sarcoidosis.  She seems to be doing well from a pulmonary standpoint.  We did review her PFTs in the office today and she has no obstruction or restriction.  She will continue the Symbicort twice a day and the albuterol as needed.      I will go ahead and get some lab work to complete testing for her sarcoidosis.  I will call her with any abnormalities.    We did review her chest x-ray in the office today and official report is pending.  I will notify her of any abnormalities.    We reviewed her 6-minute walk test and she does meet qualifications for oxygen with activity.  I would like to have her wear the oxygen with any activity.  I would also like to wear it at nighttime with  sleep.    We will also test her for allergies to see if these are contributing to his shortness of breath.    I think she would benefit from an echocardiogram and she is going to discuss this with her PCP soon.    I did encourage her to have yearly eye exams for her sarcoidosis and yearly lab work.  She has had these both completed this year.    We will have her follow-up in 3 months.    I personally spent a total of 32 minutes on patient visit today including chart review, face to face with the patient obtaining the history and physical exam, review of pertinent images and tests, counseling and discussion and/or coordination of care as described above, and documentation.  Total time excludes time spent on other separate services such as performing procedures or test interpretation, if applicable.        Jessa Huffman, GUSTAVO  10/04/612109:08 EDT  Electronically signed     Please note that portions of this note were completed with a voice recognition program.        CC: Sandy Mueller MD

## 2024-10-07 LAB — ACE SERPL-CCNC: 30 U/L (ref 14–82)

## 2024-10-10 LAB
A ALTERNATA IGE QN: <0.1 KU/L
A FUMIGATUS IGE QN: <0.1 KU/L
A1AT PHENOTYP SERPL IFE: NORMAL
A1AT SERPL-MCNC: 161 MG/DL (ref 101–187)
AMER ROACH IGE QN: <0.1 KU/L
BAHIA GRASS IGE QN: <0.1 KU/L
BERMUDA GRASS IGE QN: <0.1 KU/L
BOXELDER IGE QN: <0.1 KU/L
C HERBARUM IGE QN: <0.1 KU/L
CAT DANDER IGE QN: <0.1 KU/L
CMN PIGWEED IGE QN: <0.1 KU/L
COMMON RAGWEED IGE QN: 4.58 KU/L
CONV CLASS DESCRIPTION: ABNORMAL
D FARINAE IGE QN: <0.1 KU/L
D PTERONYSS IGE QN: <0.1 KU/L
DOG DANDER IGE QN: <0.1 KU/L
ENGL PLANTAIN IGE QN: <0.1 KU/L
HAZELNUT POLN IGE QN: <0.1 KU/L
JOHNSON GRASS IGE QN: <0.1 KU/L
KENT BLUE GRASS IGE QN: <0.1 KU/L
LONDON PLANE IGE QN: <0.1 KU/L
M RACEMOSUS IGE QN: <0.1 KU/L
MT JUNIPER IGE QN: <0.1 KU/L
MUGWORT IGE QN: 0.25 KU/L
NETTLE IGE QN: <0.1 KU/L
P NOTATUM IGE QN: <0.1 KU/L
S BOTRYOSUM IGE QN: <0.1 KU/L
SHEEP SORREL IGE QN: <0.1 KU/L
SWEET GUM IGE QN: <0.1 KU/L
WHITE ELM IGE QN: <0.1 KU/L
WHITE HICKORY IGE QN: <0.1 KU/L
WHITE MULBERRY IGE QN: <0.1 KU/L
WHITE OAK IGE QN: <0.1 KU/L

## 2025-01-14 ENCOUNTER — OFFICE VISIT (OUTPATIENT)
Age: 70
End: 2025-01-14
Payer: MEDICARE

## 2025-01-14 VITALS
WEIGHT: 253.7 LBS | OXYGEN SATURATION: 96 % | BODY MASS INDEX: 40.77 KG/M2 | HEART RATE: 78 BPM | HEIGHT: 66 IN | DIASTOLIC BLOOD PRESSURE: 74 MMHG | TEMPERATURE: 97 F | SYSTOLIC BLOOD PRESSURE: 118 MMHG

## 2025-01-14 DIAGNOSIS — J30.9 ALLERGIC RHINITIS, UNSPECIFIED SEASONALITY, UNSPECIFIED TRIGGER: ICD-10-CM

## 2025-01-14 DIAGNOSIS — R09.02 HYPOXIC: ICD-10-CM

## 2025-01-14 DIAGNOSIS — D86.9 SARCOIDOSIS: Primary | ICD-10-CM

## 2025-01-14 RX ORDER — PREDNISONE 10 MG/1
TABLET ORAL
COMMUNITY
Start: 2024-12-31 | End: 2025-01-14

## 2025-01-14 RX ORDER — AMLODIPINE BESYLATE 10 MG/1
TABLET ORAL
COMMUNITY
Start: 2024-11-07

## 2025-01-14 RX ORDER — LOSARTAN POTASSIUM 100 MG/1
TABLET ORAL
COMMUNITY
Start: 2024-12-30

## 2025-01-14 RX ORDER — PREDNISONE 20 MG/1
TABLET ORAL
COMMUNITY
End: 2025-01-14

## 2025-01-14 RX ORDER — AZITHROMYCIN 250 MG/1
TABLET, FILM COATED ORAL
COMMUNITY
Start: 2024-12-31 | End: 2025-01-14

## 2025-01-14 NOTE — PROGRESS NOTES
[facility] Pulmonary Follow up    CHIEF COMPLAINT    Mild dyspnea with exertion/cough    HISTORY OF PRESENT ILLNESS    Livier Gutierrez is a 69 y.o.female here today for follow-up.  She was originally referred to our office in October for sarcoidosis.    She was diagnosed with sarcoidosis around 2020. She is not sure of the exact date. She did have a lung biopsy to confirm these findings. She was on prednisone for about a year and then transitioned off of this. She has had no remission to this date.     Since her last appointment she has had 2 rounds of antibiotics and steroids and is finally feeling back to normal.  She did finish these about a week ago.  She does feel better than she did.    She denies any sputum production or hemoptysis.  She denies any fever, chills or night sweats.    She continues to take her Symbicort twice a day.  She also has albuterol to use but does not use it this often.    She denies any chest pain or palpitations.  She denies any lower extremity edema or calf tenderness.    She denies any sleeping difficulties.  At her last appointment she qualified for portable oxygen and started her on her oxygen during the day and at nighttime.  She states that she is using the oxygen at 2 L nasal cannula at nighttime and is sleeping well.  She has noticed an improvement in her sleep since starting the oxygen at nighttime.  She is wearing oxygen some during the day.    She quit smoking 33 years ago.    Patient Active Problem List   Diagnosis    History of thyroid cancer    Dyslipidemia    Hypertension    Allergic    Joint pain    Malignant neoplasm of thyroid gland    Allergic rhinitis    Regional enteritis    Cellulitis    Exertional dyspnea    Skin lesion    Primary osteoarthritis of both knees    Morbidly obese    Pneumonia due to COVID-19 virus    DM2 (diabetes mellitus, type 2)    Sarcoidosis    Hypoxic    Hurthle cell carcinoma    Papillary thyroid carcinoma    Sickle cell trait       Allergies    Allergen Reactions    Latex Angioedema    Mixed Ragweed Shortness Of Breath     Pt reported.    Penicillins Swelling    Aspirin Swelling       Current Outpatient Medications:     albuterol sulfate  (90 Base) MCG/ACT inhaler, INHALE TWO PUFFS BY MOUTH EVERY 4 HOURS AS NEEDED FOR WHEEZING, Disp: 8.5 g, Rfl: 1    amLODIPine (NORVASC) 10 MG tablet, , Disp: , Rfl:     budesonide-formoterol (SYMBICORT) 80-4.5 MCG/ACT inhaler, Inhale 2 puffs 2 (Two) Times a Day., Disp: 1 each, Rfl: 5    cholecalciferol (VITAMIN D3) 400 units tablet, Take 50 Units by mouth Daily., Disp: , Rfl:     ferrous sulfate 325 (65 FE) MG tablet, Take 1 tablet by mouth Daily With Breakfast., Disp: , Rfl:     glucose blood test strip, 1 po bid as directed- One touch ultra Blue 100, Disp: 100 each, Rfl: 12    glucose monitor monitoring kit, 1 each Daily., Disp: 1 each, Rfl: 0    Lancets misc, 1 each Daily., Disp: 60 each, Rfl: 11    levothyroxine (SYNTHROID, LEVOTHROID) 150 MCG tablet, Take 1 tablet by mouth Daily., Disp: , Rfl:     losartan (COZAAR) 100 MG tablet, , Disp: , Rfl:     Unable to find, 1 each 1 (One) Time. Med Name: Beet Root, Disp: , Rfl:     vitamin B-12 (CYANOCOBALAMIN) 1000 MCG tablet, Take 1 tablet by mouth 2 (Two) Times a Day., Disp: , Rfl:     vitamin C (ASCORBIC ACID) 500 MG tablet, Take 4 tablets by mouth Daily., Disp: , Rfl:     VITAMIN E 1000 UNIT capsule, Take 1 capsule by mouth Daily., Disp: , Rfl:     Coenzyme Q10 (COQ10 PO), Take  by mouth Daily. (Patient not taking: Reported on 2025), Disp: , Rfl:   MEDICATION LIST AND ALLERGIES REVIEWED.    Social History     Tobacco Use    Smoking status: Former     Current packs/day: 0.00     Average packs/day: 0.1 packs/day for 22.0 years (2.2 ttl pk-yrs)     Types: Cigarettes     Start date:      Quit date:      Years since quittin.0    Smokeless tobacco: Never   Vaping Use    Vaping status: Never Used   Substance Use Topics    Alcohol use: No    Drug use: No  "      FAMILY AND SOCIAL HISTORY REVIEWED.    Review of Systems   Constitutional:  Negative for activity change, appetite change, fatigue, fever and unexpected weight change.   HENT:  Negative for congestion, postnasal drip, rhinorrhea, sinus pressure, sore throat and voice change.    Eyes:  Negative for visual disturbance.   Respiratory:  Positive for cough and shortness of breath. Negative for chest tightness and wheezing.    Cardiovascular:  Negative for chest pain, palpitations and leg swelling.   Gastrointestinal:  Negative for abdominal distention, abdominal pain, nausea and vomiting.   Endocrine: Negative for cold intolerance and heat intolerance.   Genitourinary:  Negative for difficulty urinating and urgency.   Musculoskeletal:  Negative for arthralgias, back pain and neck pain.   Skin:  Negative for color change and pallor.   Allergic/Immunologic: Negative for environmental allergies and food allergies.   Neurological:  Negative for dizziness, syncope, weakness and light-headedness.   Hematological:  Negative for adenopathy. Does not bruise/bleed easily.   Psychiatric/Behavioral:  Negative for agitation and behavioral problems.    .    /74   Pulse 78   Temp 97 °F (36.1 °C)   Ht 167.6 cm (66\")   Wt 115 kg (253 lb 11.2 oz)   LMP  (LMP Unknown)   SpO2 96% Comment: Room air at rest  BMI 40.95 kg/m²     Immunization History   Administered Date(s) Administered    COVID-19 (PFIZER) BIVALENT 12+YRS 02/08/2023    COVID-19 (PFIZER) Purple Cap Monovalent 10/21/2021, 11/16/2021    Fluad Quad 65+ 11/17/2020    Fluzone High-Dose 65+YRS 02/01/2019, 10/04/2019    Fluzone High-Dose 65+yrs 11/23/2021    Fluzone Quad >6mos (Multi-dose) 10/18/2016    Influenza, Unspecified 10/01/2015    Pneumococcal Conjugate 13-Valent (PCV13) 04/19/2016    Pneumococcal Polysaccharide (PPSV23) 01/01/2016, 02/01/2019       Physical Exam  Vitals and nursing note reviewed.   Constitutional:       Appearance: She is well-developed. " She is not diaphoretic.   HENT:      Head: Normocephalic and atraumatic.   Eyes:      Pupils: Pupils are equal, round, and reactive to light.   Neck:      Thyroid: No thyromegaly.   Cardiovascular:      Rate and Rhythm: Normal rate and regular rhythm.      Heart sounds: Normal heart sounds. No murmur heard.     No friction rub. No gallop.   Pulmonary:      Effort: Pulmonary effort is normal. No respiratory distress.      Breath sounds: Normal breath sounds. No wheezing or rales.   Chest:      Chest wall: No tenderness.   Abdominal:      General: Bowel sounds are normal.      Palpations: Abdomen is soft.      Tenderness: There is no abdominal tenderness.   Musculoskeletal:         General: No swelling. Normal range of motion.      Cervical back: Normal range of motion and neck supple.   Lymphadenopathy:      Cervical: No cervical adenopathy.   Skin:     General: Skin is warm and dry.      Capillary Refill: Capillary refill takes less than 2 seconds.   Neurological:      Mental Status: She is alert and oriented to person, place, and time.   Psychiatric:         Mood and Affect: Mood normal.         Behavior: Behavior normal.           RESULTS    6-minute walk test: Patient ambulated 900 feet never desaturated below 92%, she does not meet qualifications for oxygen with activity, her percent of predicted was 79%.    PROBLEM LIST    Problem List Items Addressed This Visit          Allergies and Adverse Reactions    Allergic rhinitis       Multi-system (Lupus, Sarcoid...)    Sarcoidosis - Primary    Relevant Orders    CT Chest Without Contrast    Oxygen Therapy    Walking Oximetry (Completed)       Pulmonary and Pneumonias    Hypoxic         DISCUSSION    Ms. Gutierrez was here for follow-up.  Seems to be doing okay from a  pulmonary standpoint.  She was sick since her last appointment but is slowly recovering from this infection.    She will continue to take her allergy medicine daily.    We did review her 6-minute walk  test and she does not need oxygen with activity.  I did advise her to maintain a saturation above 88% at all times at home.  She may use her oxygen with exertion if needed.    She will continue to wear her oxygen at nighttime for her hypoxia.    I would like to get a CT scan of the chest without contrast to further  examine her sarcoidosis.    We reviewed the lab work that was performed in her last appointment and does show a normal ACE level, normal alpha-1 and an abnormal allergy panel.  She is highly allergic to ragweed.  I did encourage her to take her allergy medicine daily.    We will have her follow-up in 6 months    I personally spent a total of 31 minutes on patient visit today including chart review, face to face with the patient obtaining the history and physical exam, review of pertinent images and tests, counseling and discussion and/or coordination of care as described above, and documentation.  Total time excludes time spent on other separate services such as performing procedures or test interpretation, if applicable.        Jessa Huffman, GUSTAVO  01/14/202514:13 EST  Electronically signed     Please note that portions of this note were completed with a voice recognition program.        CC: Sandy Mueller MD

## 2025-01-24 ENCOUNTER — HOSPITAL ENCOUNTER (OUTPATIENT)
Facility: HOSPITAL | Age: 70
Discharge: HOME | End: 2025-01-24
Payer: MEDICARE

## 2025-01-24 DIAGNOSIS — D86.9 SARCOIDOSIS: ICD-10-CM

## 2025-01-24 PROCEDURE — 71250 CT THORAX DX C-: CPT

## 2025-01-28 ENCOUNTER — TELEPHONE (OUTPATIENT)
Age: 70
End: 2025-01-28
Payer: MEDICARE

## 2025-01-28 NOTE — TELEPHONE ENCOUNTER
----- Message from Jessa Huffman sent at 1/28/2025 11:24 AM EST -----  Please let the patient know that her CT scan is stable, no acute process that we need to be concerned about right now.  She can keep her follow-up with me in the summer.  ----- Message -----  From: Artie Rad Results [place]  Sent: 1/27/2025  12:49 PM EST  To: GUSTAVO Carter

## 2025-07-14 ENCOUNTER — OFFICE VISIT (OUTPATIENT)
Age: 70
End: 2025-07-14
Payer: MEDICARE

## 2025-07-14 ENCOUNTER — TELEPHONE (OUTPATIENT)
Age: 70
End: 2025-07-14

## 2025-07-14 VITALS
BODY MASS INDEX: 38.41 KG/M2 | DIASTOLIC BLOOD PRESSURE: 72 MMHG | TEMPERATURE: 96.1 F | OXYGEN SATURATION: 98 % | HEART RATE: 81 BPM | SYSTOLIC BLOOD PRESSURE: 124 MMHG | HEIGHT: 66 IN | WEIGHT: 239 LBS

## 2025-07-14 DIAGNOSIS — J96.11 CHRONIC RESPIRATORY FAILURE WITH HYPOXIA: ICD-10-CM

## 2025-07-14 DIAGNOSIS — Z86.16 HISTORY OF 2019 NOVEL CORONAVIRUS DISEASE (COVID-19): ICD-10-CM

## 2025-07-14 DIAGNOSIS — D86.9 SARCOIDOSIS: Primary | ICD-10-CM

## 2025-07-14 DIAGNOSIS — J30.9 ALLERGIC RHINITIS, UNSPECIFIED SEASONALITY, UNSPECIFIED TRIGGER: ICD-10-CM

## 2025-07-14 PROCEDURE — 1159F MED LIST DOCD IN RCRD: CPT | Performed by: NURSE PRACTITIONER

## 2025-07-14 PROCEDURE — 3078F DIAST BP <80 MM HG: CPT | Performed by: NURSE PRACTITIONER

## 2025-07-14 PROCEDURE — 99214 OFFICE O/P EST MOD 30 MIN: CPT | Performed by: NURSE PRACTITIONER

## 2025-07-14 PROCEDURE — G2211 COMPLEX E/M VISIT ADD ON: HCPCS | Performed by: NURSE PRACTITIONER

## 2025-07-14 PROCEDURE — 3074F SYST BP LT 130 MM HG: CPT | Performed by: NURSE PRACTITIONER

## 2025-07-14 PROCEDURE — 1160F RVW MEDS BY RX/DR IN RCRD: CPT | Performed by: NURSE PRACTITIONER

## 2025-07-14 RX ORDER — MONTELUKAST SODIUM 10 MG/1
TABLET ORAL
COMMUNITY
Start: 2025-05-16

## 2025-07-14 RX ORDER — BUDESONIDE AND FORMOTEROL FUMARATE DIHYDRATE 160; 4.5 UG/1; UG/1
2 AEROSOL RESPIRATORY (INHALATION) 2 TIMES DAILY
Qty: 10.2 G | Refills: 11 | Status: SHIPPED | OUTPATIENT
Start: 2025-07-14 | End: 2025-07-16 | Stop reason: SDUPTHER

## 2025-07-14 RX ORDER — FUROSEMIDE 20 MG/1
TABLET ORAL
COMMUNITY
Start: 2025-05-16

## 2025-07-14 RX ORDER — ALBUTEROL SULFATE 90 UG/1
2 INHALANT RESPIRATORY (INHALATION) EVERY 4 HOURS PRN
Qty: 8.5 G | Refills: 6 | Status: SHIPPED | OUTPATIENT
Start: 2025-07-14

## 2025-07-14 RX ORDER — BUDESONIDE, GLYCOPYRROLATE, AND FORMOTEROL FUMARATE 160; 9; 4.8 UG/1; UG/1; UG/1
2 AEROSOL, METERED RESPIRATORY (INHALATION) 2 TIMES DAILY
Qty: 1 EACH | Refills: 0 | COMMUNITY
Start: 2025-07-14

## 2025-07-14 NOTE — TELEPHONE ENCOUNTER
Patient called to let Jessa know that the cantrell for the 2 inhalers is 257.00 and she would like something more affordable

## 2025-07-14 NOTE — TELEPHONE ENCOUNTER
Called pt and I advised her to call insurance for formulary alternatives. Pt verbalized understanding and appreciation.

## 2025-07-14 NOTE — PROGRESS NOTES
[facility] Follow up    CHIEF COMPLAINT    cough    HISTORY OF PRESENT ILLNESS    Livier Gutierrez is a 70 y.o.female here today for follow-up.  She was last seen in the office by me in January.  She denies any respiratory illnesses since her last appointment.    She was diagnosed with sarcoidosis around 2020.  She had a lung biopsy.  She was on prednisone for about a year and then transitioned off of this.  She has had no remission.    She has noticed more coughing recently.  She has not been on her Symbicort inhaler for a while now.  She is also noticed more wheezing.  She uses her albuterol or nebulizers as needed.  She states they do help but not very long.    She denies any sputum production or hemoptysis.  She denies any fever, chills or night sweats.    She denies any chest pain or palpitations.  She denies any lower extremity edema or calf tenderness.    She has been using her oxygen at 2 L during the day as needed and 2 L at nighttime.  She denies any sleeping difficulties.    She denies any reflux symptoms.    She quit smoking 34 years ago.    Patient Active Problem List   Diagnosis    History of thyroid cancer    Dyslipidemia    Hypertension    Allergic    Joint pain    Malignant neoplasm of thyroid gland    Allergic rhinitis    Regional enteritis    Cellulitis    Exertional dyspnea    Skin lesion    Primary osteoarthritis of both knees    Morbidly obese    Pneumonia due to COVID-19 virus    DM2 (diabetes mellitus, type 2)    Sarcoidosis    Hypoxic    Hurthle cell carcinoma    Papillary thyroid carcinoma    Sickle cell trait    Chronic respiratory failure with hypoxia       Allergies   Allergen Reactions    Latex Angioedema    Mixed Ragweed Shortness Of Breath     Pt reported.    Penicillins Swelling    Aspirin Swelling       Current Outpatient Medications:     albuterol sulfate  (90 Base) MCG/ACT inhaler, Inhale 2 puffs Every 4 (Four) Hours As Needed for Wheezing., Disp: 8.5 g, Rfl: 6     cholecalciferol (VITAMIN D3) 400 units tablet, Take 50 Units by mouth Daily., Disp: , Rfl:     Coenzyme Q10 (COQ10 PO), Take  by mouth Daily., Disp: , Rfl:     ferrous sulfate 325 (65 FE) MG tablet, Take 1 tablet by mouth Daily With Breakfast., Disp: , Rfl:     Fexofenadine HCl (MUCINEX ALLERGY PO), Take 400 mg by mouth 3 times a day., Disp: , Rfl:     furosemide (LASIX) 20 MG tablet, , Disp: , Rfl:     glucose blood test strip, 1 po bid as directed- One touch ultra Blue 100, Disp: 100 each, Rfl: 12    glucose monitor monitoring kit, 1 each Daily., Disp: 1 each, Rfl: 0    Lancets misc, 1 each Daily., Disp: 60 each, Rfl: 11    levothyroxine (SYNTHROID, LEVOTHROID) 150 MCG tablet, Take 1 tablet by mouth Daily., Disp: , Rfl:     losartan (COZAAR) 100 MG tablet, , Disp: , Rfl:     montelukast (SINGULAIR) 10 MG tablet, , Disp: , Rfl:     Unable to find, 1 each 1 (One) Time. Med Name: Beet Root, Disp: , Rfl:     vitamin B-12 (CYANOCOBALAMIN) 1000 MCG tablet, Take 1 tablet by mouth 2 (Two) Times a Day., Disp: , Rfl:     vitamin C (ASCORBIC ACID) 500 MG tablet, Take 4 tablets by mouth Daily., Disp: , Rfl:     VITAMIN E 1000 UNIT capsule, Take 1 capsule by mouth Daily., Disp: , Rfl:     Budeson-Glycopyrrol-Formoterol (Breztri Aerosphere) 160-9-4.8 MCG/ACT aerosol inhaler, Inhale 2 puffs 2 (Two) Times a Day., Disp: 1 each, Rfl: 0    budesonide-formoterol (SYMBICORT) 160-4.5 MCG/ACT inhaler, Inhale 2 puffs 2 (Two) Times a Day., Disp: 10.2 g, Rfl: 11  MEDICATION LIST AND ALLERGIES REVIEWED.    Social History     Tobacco Use    Smoking status: Former     Current packs/day: 0.00     Average packs/day: 0.1 packs/day for 22.0 years (2.2 ttl pk-yrs)     Types: Cigarettes     Start date:      Quit date:      Years since quittin.5    Smokeless tobacco: Never   Vaping Use    Vaping status: Never Used   Substance Use Topics    Alcohol use: No    Drug use: No       FAMILY AND SOCIAL HISTORY REVIEWED.    Review of Systems  "  Constitutional:  Positive for activity change. Negative for appetite change, fatigue, fever and unexpected weight change.   HENT:  Negative for congestion, postnasal drip, rhinorrhea, sinus pressure, sore throat and voice change.    Eyes:  Negative for visual disturbance.   Respiratory:  Positive for cough, shortness of breath and wheezing. Negative for chest tightness.    Cardiovascular:  Negative for chest pain, palpitations and leg swelling.   Gastrointestinal:  Negative for abdominal distention, abdominal pain, nausea and vomiting.   Endocrine: Negative for cold intolerance and heat intolerance.   Genitourinary:  Negative for difficulty urinating and urgency.   Musculoskeletal:  Negative for arthralgias, back pain and neck pain.   Skin:  Negative for color change and pallor.   Allergic/Immunologic: Negative for environmental allergies and food allergies.   Neurological:  Negative for dizziness, syncope, weakness and light-headedness.   Hematological:  Negative for adenopathy. Does not bruise/bleed easily.   Psychiatric/Behavioral:  Negative for agitation and behavioral problems.    .    /72   Pulse 81   Temp 96.1 °F (35.6 °C)   Ht 167.6 cm (66\")   Wt 108 kg (239 lb)   LMP  (LMP Unknown)   SpO2 98% Comment: Room air at rest  BMI 38.58 kg/m²     Immunization History   Administered Date(s) Administered    COVID-19 (PFIZER) BIVALENT 12+YRS 02/08/2023    COVID-19 (PFIZER) Purple Cap Monovalent 10/21/2021, 11/16/2021    Fluad Quad 65+ 11/17/2020    Fluzone High-Dose 65+YRS 02/01/2019, 10/04/2019    Fluzone High-Dose 65+yrs 11/23/2021    Fluzone Quad >6mos (Multi-dose) 10/18/2016    Influenza, Unspecified 10/01/2015    Pneumococcal Conjugate 13-Valent (PCV13) 04/19/2016    Pneumococcal Polysaccharide (PPSV23) 01/01/2016, 02/01/2019       Physical Exam  Vitals and nursing note reviewed.   Constitutional:       Appearance: She is well-developed. She is not diaphoretic.   HENT:      Head: Normocephalic and " atraumatic.   Eyes:      Pupils: Pupils are equal, round, and reactive to light.   Neck:      Thyroid: No thyromegaly.   Cardiovascular:      Rate and Rhythm: Normal rate and regular rhythm.      Heart sounds: Normal heart sounds. No murmur heard.     No friction rub. No gallop.   Pulmonary:      Effort: Pulmonary effort is normal. No respiratory distress.      Breath sounds: Wheezing present. No rales.   Chest:      Chest wall: No tenderness.   Abdominal:      General: Bowel sounds are normal.      Palpations: Abdomen is soft.      Tenderness: There is no abdominal tenderness.   Musculoskeletal:         General: No swelling. Normal range of motion.      Cervical back: Normal range of motion and neck supple.   Lymphadenopathy:      Cervical: No cervical adenopathy.   Skin:     General: Skin is warm and dry.      Capillary Refill: Capillary refill takes less than 2 seconds.   Neurological:      Mental Status: She is alert and oriented to person, place, and time.   Psychiatric:         Mood and Affect: Mood normal.         Behavior: Behavior normal.           RESULTS    PROBLEM LIST    Problem List Items Addressed This Visit          Allergies and Adverse Reactions    Allergic rhinitis       Multi-system (Lupus, Sarcoid...)    Sarcoidosis - Primary    Relevant Medications    budesonide-formoterol (SYMBICORT) 160-4.5 MCG/ACT inhaler       Pulmonary and Pneumonias    Chronic respiratory failure with hypoxia     Other Visit Diagnoses         History of 2019 novel coronavirus disease (COVID-19)        Relevant Medications    albuterol sulfate  (90 Base) MCG/ACT inhaler              DISCUSSION    Ms. Gutierrez is here for follow-up.  She has been doing okay from a pulmonary standpoint.  She is wheezing in the office today.  I would have like to start her on some prednisone but she wanted to hold off on this for now.  She could  her Symbicort and see if that helps her wheezing.  I did advise her if it does not  she needs to get some steroids and will call me back later this week.    I did advise her to take the Symbicort twice a day to help with her cough and wheezing.  She will continue to use the albuterol and nebulizers at least once or twice per day.    She will continue to take allergy medicine daily.    I did advise her she needs to have a yearly ophthalmologist exam and she has already had this.  She does also had blood work performed recently.    She will continue to wear her oxygen 2 L to maintain a saturation above 88% at all times.  She will continue her oxygen at nighttime as well.    We will schedule her follow-up in 6 months.    I personally spent a total of 33 minutes on patient visit today including chart review, face to face with the patient obtaining the history and physical exam, review of pertinent images and tests, counseling and discussion and/or coordination of care as described above, and documentation.  Total time excludes time spent on other separate services such as performing procedures or test interpretation, if applicable.        Jessa Huffman, GUSTAVO  07/14/202509:51 EDT  Electronically signed     Please note that portions of this note were completed with a voice recognition program.        CC: Sandy Mueller MD

## 2025-07-16 DIAGNOSIS — D86.9 SARCOIDOSIS: ICD-10-CM

## 2025-07-16 RX ORDER — BUDESONIDE AND FORMOTEROL FUMARATE DIHYDRATE 160; 4.5 UG/1; UG/1
2 AEROSOL RESPIRATORY (INHALATION) 2 TIMES DAILY
Qty: 10.2 G | Refills: 11 | Status: SHIPPED | OUTPATIENT
Start: 2025-07-16

## 2025-08-11 ENCOUNTER — TELEPHONE (OUTPATIENT)
Dept: ADMINISTRATIVE | Facility: OTHER | Age: 70
End: 2025-08-11
Payer: MEDICARE

## 2025-08-12 ENCOUNTER — TELEPHONE (OUTPATIENT)
Dept: ADMINISTRATIVE | Facility: OTHER | Age: 70
End: 2025-08-12
Payer: MEDICARE

## 2025-08-12 ENCOUNTER — TELEPHONE (OUTPATIENT)
Age: 70
End: 2025-08-12
Payer: MEDICARE